# Patient Record
Sex: FEMALE | Race: WHITE | Employment: OTHER | URBAN - NONMETROPOLITAN AREA
[De-identification: names, ages, dates, MRNs, and addresses within clinical notes are randomized per-mention and may not be internally consistent; named-entity substitution may affect disease eponyms.]

---

## 2017-01-05 RX ORDER — LOSARTAN POTASSIUM AND HYDROCHLOROTHIAZIDE 12.5; 5 MG/1; MG/1
TABLET ORAL
Qty: 90 TABLET | Refills: 1 | Status: SHIPPED | OUTPATIENT
Start: 2017-01-05 | End: 2018-07-13 | Stop reason: SDUPTHER

## 2017-01-27 DIAGNOSIS — Z12.31 ENCOUNTER FOR SCREENING MAMMOGRAM FOR BREAST CANCER: Primary | ICD-10-CM

## 2017-02-01 ENCOUNTER — HOSPITAL ENCOUNTER (OUTPATIENT)
Dept: CT IMAGING | Facility: MEDICAL CENTER | Age: 72
Discharge: HOME | End: 2017-02-01
Attending: NURSE PRACTITIONER | Admitting: NURSE PRACTITIONER

## 2017-02-01 DIAGNOSIS — C50.411 MALIGNANT NEOPLASM OF UPPER-OUTER QUADRANT OF RIGHT FEMALE BREAST (HCC): ICD-10-CM

## 2017-02-01 DIAGNOSIS — C79.51 SECONDARY MALIGNANT NEOPLASM OF BONE (HCC): ICD-10-CM

## 2017-02-01 DIAGNOSIS — C50.411 BREAST CANCER OF UPPER-OUTER QUADRANT OF RIGHT FEMALE BREAST (HCC): ICD-10-CM

## 2017-02-08 DIAGNOSIS — R06.09 DYSPNEA ON EXERTION: ICD-10-CM

## 2017-02-09 RX ORDER — RIVAROXABAN 20 MG/1
TABLET, FILM COATED ORAL
Qty: 30 TABLET | Refills: 2 | Status: SHIPPED | OUTPATIENT
Start: 2017-02-09 | End: 2017-05-11 | Stop reason: SDUPTHER

## 2017-02-12 DIAGNOSIS — F41.1 GENERALIZED ANXIETY DISORDER: ICD-10-CM

## 2017-02-16 DIAGNOSIS — M54.41 CHRONIC MIDLINE LOW BACK PAIN WITH BILATERAL SCIATICA: ICD-10-CM

## 2017-02-16 DIAGNOSIS — G89.29 CHRONIC MIDLINE LOW BACK PAIN WITH BILATERAL SCIATICA: ICD-10-CM

## 2017-02-16 DIAGNOSIS — M54.42 CHRONIC MIDLINE LOW BACK PAIN WITH BILATERAL SCIATICA: ICD-10-CM

## 2017-02-16 RX ORDER — GABAPENTIN 300 MG/1
CAPSULE ORAL
Qty: 90 CAPSULE | Refills: 2 | Status: SHIPPED | OUTPATIENT
Start: 2017-02-16 | End: 2017-06-12 | Stop reason: SDUPTHER

## 2017-04-20 ENCOUNTER — TELEPHONE (OUTPATIENT)
Dept: ORTHOPEDIC SURGERY | Age: 72
End: 2017-04-20

## 2017-04-20 ENCOUNTER — OFFICE VISIT (OUTPATIENT)
Dept: ORTHOPEDIC SURGERY | Age: 72
End: 2017-04-20

## 2017-04-20 VITALS
SYSTOLIC BLOOD PRESSURE: 130 MMHG | BODY MASS INDEX: 33.86 KG/M2 | HEART RATE: 88 BPM | DIASTOLIC BLOOD PRESSURE: 72 MMHG | WEIGHT: 184 LBS | HEIGHT: 62 IN

## 2017-04-20 DIAGNOSIS — M25.561 PAIN IN BOTH KNEES, UNSPECIFIED CHRONICITY: Primary | ICD-10-CM

## 2017-04-20 DIAGNOSIS — M25.562 PAIN IN BOTH KNEES, UNSPECIFIED CHRONICITY: Primary | ICD-10-CM

## 2017-04-20 DIAGNOSIS — M17.0 PRIMARY OSTEOARTHRITIS OF BOTH KNEES: ICD-10-CM

## 2017-04-20 PROCEDURE — 99213 OFFICE O/P EST LOW 20 MIN: CPT | Performed by: PHYSICIAN ASSISTANT

## 2017-04-20 PROCEDURE — 73564 X-RAY EXAM KNEE 4 OR MORE: CPT | Performed by: PHYSICIAN ASSISTANT

## 2017-04-20 PROCEDURE — 20610 DRAIN/INJ JOINT/BURSA W/O US: CPT | Performed by: PHYSICIAN ASSISTANT

## 2017-04-28 ENCOUNTER — NURSE ONLY (OUTPATIENT)
Dept: ORTHOPEDIC SURGERY | Age: 72
End: 2017-04-28

## 2017-04-28 VITALS
WEIGHT: 184 LBS | DIASTOLIC BLOOD PRESSURE: 78 MMHG | HEIGHT: 62 IN | HEART RATE: 68 BPM | BODY MASS INDEX: 33.86 KG/M2 | SYSTOLIC BLOOD PRESSURE: 129 MMHG

## 2017-04-28 DIAGNOSIS — M17.0 PRIMARY OSTEOARTHRITIS OF BOTH KNEES: Primary | ICD-10-CM

## 2017-04-28 PROCEDURE — 20610 DRAIN/INJ JOINT/BURSA W/O US: CPT | Performed by: PHYSICIAN ASSISTANT

## 2017-05-01 ENCOUNTER — OFFICE VISIT (OUTPATIENT)
Dept: FAMILY MEDICINE CLINIC | Age: 72
End: 2017-05-01

## 2017-05-01 VITALS
HEIGHT: 63 IN | BODY MASS INDEX: 33.16 KG/M2 | HEART RATE: 93 BPM | WEIGHT: 187.13 LBS | DIASTOLIC BLOOD PRESSURE: 78 MMHG | SYSTOLIC BLOOD PRESSURE: 129 MMHG | OXYGEN SATURATION: 93 %

## 2017-05-01 DIAGNOSIS — M15.9 PRIMARY OSTEOARTHRITIS INVOLVING MULTIPLE JOINTS: ICD-10-CM

## 2017-05-01 DIAGNOSIS — I10 ESSENTIAL HYPERTENSION: Primary | ICD-10-CM

## 2017-05-01 DIAGNOSIS — C79.51 SECONDARY MALIGNANT NEOPLASM OF BONE (HCC): ICD-10-CM

## 2017-05-01 DIAGNOSIS — C50.411 BREAST CANCER OF UPPER-OUTER QUADRANT OF RIGHT FEMALE BREAST (HCC): ICD-10-CM

## 2017-05-01 PROCEDURE — 99214 OFFICE O/P EST MOD 30 MIN: CPT | Performed by: FAMILY MEDICINE

## 2017-05-04 ENCOUNTER — NURSE ONLY (OUTPATIENT)
Dept: ORTHOPEDIC SURGERY | Age: 72
End: 2017-05-04

## 2017-05-04 VITALS — HEIGHT: 62 IN | WEIGHT: 188 LBS | BODY MASS INDEX: 34.6 KG/M2

## 2017-05-04 DIAGNOSIS — M17.0 PRIMARY OSTEOARTHRITIS OF BOTH KNEES: Primary | ICD-10-CM

## 2017-05-04 PROCEDURE — 20610 DRAIN/INJ JOINT/BURSA W/O US: CPT | Performed by: PHYSICIAN ASSISTANT

## 2017-05-10 ENCOUNTER — TELEPHONE (OUTPATIENT)
Dept: ORTHOPEDIC SURGERY | Age: 72
End: 2017-05-10

## 2017-05-11 DIAGNOSIS — R06.09 DYSPNEA ON EXERTION: ICD-10-CM

## 2017-05-11 RX ORDER — RIVAROXABAN 20 MG/1
TABLET, FILM COATED ORAL
Qty: 30 TABLET | Refills: 5 | Status: SHIPPED | OUTPATIENT
Start: 2017-05-11 | End: 2017-11-13 | Stop reason: SDUPTHER

## 2017-05-25 RX ORDER — OMEPRAZOLE 20 MG/1
CAPSULE, DELAYED RELEASE ORAL
Qty: 90 CAPSULE | Refills: 1 | Status: SHIPPED | OUTPATIENT
Start: 2017-05-25 | End: 2017-11-17 | Stop reason: SDUPTHER

## 2017-05-30 ENCOUNTER — HOSPITAL ENCOUNTER (OUTPATIENT)
Dept: CT IMAGING | Facility: MEDICAL CENTER | Age: 72
Discharge: HOME | End: 2017-05-30
Attending: INTERNAL MEDICINE | Admitting: INTERNAL MEDICINE

## 2017-05-30 DIAGNOSIS — C50.411 MALIGNANT NEOPLASM OF UPPER-OUTER QUADRANT OF RIGHT FEMALE BREAST (HCC): ICD-10-CM

## 2017-05-30 DIAGNOSIS — C79.51 SECONDARY MALIGNANT NEOPLASM OF BONE (HCC): ICD-10-CM

## 2017-05-30 DIAGNOSIS — C50.411 BREAST CANCER OF UPPER-OUTER QUADRANT OF RIGHT FEMALE BREAST (HCC): ICD-10-CM

## 2017-06-12 DIAGNOSIS — M54.42 CHRONIC MIDLINE LOW BACK PAIN WITH BILATERAL SCIATICA: ICD-10-CM

## 2017-06-12 DIAGNOSIS — G89.29 CHRONIC MIDLINE LOW BACK PAIN WITH BILATERAL SCIATICA: ICD-10-CM

## 2017-06-12 DIAGNOSIS — M54.41 CHRONIC MIDLINE LOW BACK PAIN WITH BILATERAL SCIATICA: ICD-10-CM

## 2017-06-12 RX ORDER — GABAPENTIN 300 MG/1
CAPSULE ORAL
Qty: 90 CAPSULE | Refills: 2 | Status: SHIPPED | OUTPATIENT
Start: 2017-06-12 | End: 2017-06-14 | Stop reason: ALTCHOICE

## 2017-06-14 ENCOUNTER — OFFICE VISIT (OUTPATIENT)
Dept: FAMILY MEDICINE CLINIC | Age: 72
End: 2017-06-14

## 2017-06-14 VITALS
WEIGHT: 184.6 LBS | DIASTOLIC BLOOD PRESSURE: 90 MMHG | BODY MASS INDEX: 33.97 KG/M2 | OXYGEN SATURATION: 96 % | SYSTOLIC BLOOD PRESSURE: 134 MMHG | HEART RATE: 74 BPM

## 2017-06-14 DIAGNOSIS — M54.32 SCIATICA OF LEFT SIDE: Primary | ICD-10-CM

## 2017-06-14 DIAGNOSIS — C79.51 SECONDARY MALIGNANT NEOPLASM OF BONE (HCC): ICD-10-CM

## 2017-06-14 PROCEDURE — 99213 OFFICE O/P EST LOW 20 MIN: CPT | Performed by: FAMILY MEDICINE

## 2017-06-14 RX ORDER — PREGABALIN 75 MG/1
75 CAPSULE ORAL 2 TIMES DAILY
Qty: 60 CAPSULE | Refills: 3 | Status: SHIPPED | OUTPATIENT
Start: 2017-06-14 | End: 2017-12-10 | Stop reason: SDUPTHER

## 2017-08-17 ENCOUNTER — HOSPITAL ENCOUNTER (OUTPATIENT)
Dept: CT IMAGING | Facility: MEDICAL CENTER | Age: 72
Discharge: HOME | End: 2017-08-17
Attending: INTERNAL MEDICINE | Admitting: INTERNAL MEDICINE

## 2017-08-17 DIAGNOSIS — C50.411 BREAST CANCER OF UPPER-OUTER QUADRANT OF RIGHT FEMALE BREAST (HCC): ICD-10-CM

## 2017-08-17 DIAGNOSIS — C50.411 MALIGNANT NEOPLASM OF UPPER-OUTER QUADRANT OF RIGHT FEMALE BREAST (HCC): ICD-10-CM

## 2017-08-21 DIAGNOSIS — F41.1 GENERALIZED ANXIETY DISORDER: ICD-10-CM

## 2017-08-31 ENCOUNTER — TELEPHONE (OUTPATIENT)
Dept: FAMILY MEDICINE CLINIC | Age: 72
End: 2017-08-31

## 2017-11-06 ENCOUNTER — OFFICE VISIT (OUTPATIENT)
Dept: FAMILY MEDICINE CLINIC | Age: 72
End: 2017-11-06

## 2017-11-06 VITALS
SYSTOLIC BLOOD PRESSURE: 136 MMHG | WEIGHT: 180.5 LBS | HEART RATE: 87 BPM | BODY MASS INDEX: 31.98 KG/M2 | DIASTOLIC BLOOD PRESSURE: 84 MMHG | HEIGHT: 63 IN

## 2017-11-06 DIAGNOSIS — I10 ESSENTIAL HYPERTENSION: ICD-10-CM

## 2017-11-06 DIAGNOSIS — E78.2 MIXED HYPERLIPIDEMIA: ICD-10-CM

## 2017-11-06 DIAGNOSIS — I82.4Z1 ACUTE DEEP VEIN THROMBOSIS (DVT) OF DISTAL END OF RIGHT LOWER EXTREMITY (HCC): ICD-10-CM

## 2017-11-06 DIAGNOSIS — Z23 NEED FOR STREPTOCOCCUS PNEUMONIAE VACCINATION: ICD-10-CM

## 2017-11-06 DIAGNOSIS — C50.411 MALIGNANT NEOPLASM OF UPPER-OUTER QUADRANT OF RIGHT BREAST IN FEMALE, ESTROGEN RECEPTOR NEGATIVE (HCC): Primary | ICD-10-CM

## 2017-11-06 DIAGNOSIS — Z17.1 MALIGNANT NEOPLASM OF UPPER-OUTER QUADRANT OF RIGHT BREAST IN FEMALE, ESTROGEN RECEPTOR NEGATIVE (HCC): Primary | ICD-10-CM

## 2017-11-06 DIAGNOSIS — F41.1 GENERALIZED ANXIETY DISORDER: ICD-10-CM

## 2017-11-06 PROCEDURE — 99214 OFFICE O/P EST MOD 30 MIN: CPT | Performed by: FAMILY MEDICINE

## 2017-11-06 PROCEDURE — G8417 CALC BMI ABV UP PARAM F/U: HCPCS | Performed by: FAMILY MEDICINE

## 2017-11-06 PROCEDURE — 3014F SCREEN MAMMO DOC REV: CPT | Performed by: FAMILY MEDICINE

## 2017-11-06 PROCEDURE — 1090F PRES/ABSN URINE INCON ASSESS: CPT | Performed by: FAMILY MEDICINE

## 2017-11-06 PROCEDURE — G8482 FLU IMMUNIZE ORDER/ADMIN: HCPCS | Performed by: FAMILY MEDICINE

## 2017-11-06 PROCEDURE — 1123F ACP DISCUSS/DSCN MKR DOCD: CPT | Performed by: FAMILY MEDICINE

## 2017-11-06 PROCEDURE — G8399 PT W/DXA RESULTS DOCUMENT: HCPCS | Performed by: FAMILY MEDICINE

## 2017-11-06 PROCEDURE — 90670 PCV13 VACCINE IM: CPT | Performed by: FAMILY MEDICINE

## 2017-11-06 PROCEDURE — G0009 ADMIN PNEUMOCOCCAL VACCINE: HCPCS | Performed by: FAMILY MEDICINE

## 2017-11-06 PROCEDURE — 4040F PNEUMOC VAC/ADMIN/RCVD: CPT | Performed by: FAMILY MEDICINE

## 2017-11-06 PROCEDURE — 1036F TOBACCO NON-USER: CPT | Performed by: FAMILY MEDICINE

## 2017-11-06 PROCEDURE — G8427 DOCREV CUR MEDS BY ELIG CLIN: HCPCS | Performed by: FAMILY MEDICINE

## 2017-11-06 PROCEDURE — 3017F COLORECTAL CA SCREEN DOC REV: CPT | Performed by: FAMILY MEDICINE

## 2017-11-06 NOTE — PROGRESS NOTES
Subjective:   She presents for follow up of her breast cancer high blood pressure anxiety high cholesterol and DVT. She has blood work done on a regular basis by hematology. The only thing they don't check is her cholesterol but she states she is not worried about cholesterol since she has all these other issues . She already had her flu shot her anxiety has been under control with current medications her blood pressures have been controlled she's taken her blood thinners every day and will for life       She is allergic to codeine. She   reports that she quit smoking about 18 years ago. Her smoking use included Cigarettes. She has a 0.10 pack-year smoking history. She has never used smokeless tobacco. Review of systems negative for chest pain swelling shortness of breath wheezing abdominal pain rectal bleeding  Objective:   /84 (Site: Right Arm, Position: Sitting, Cuff Size: Large Adult)   Pulse 87   Ht 5' 2.5\" (1.588 m)   Wt 180 lb 8 oz (81.9 kg)   BMI 32.49 kg/m²   BP Readings from Last 3 Encounters:   11/06/17 136/84   08/24/17 120/66   07/27/17 132/74     Physical Exam   Constitutional: She is oriented to person, place, and time. She appears well-developed and well-nourished. Non-toxic appearance. HENT:   Head: Normocephalic. Eyes: Conjunctivae are normal. Right eye exhibits no discharge. Left eye exhibits no discharge. No scleral icterus. Neck: Neck supple. Carotid bruit is not present. No thyroid mass and no thyromegaly present. Cardiovascular: Normal rate, regular rhythm and normal heart sounds. No murmur heard. Pulmonary/Chest: Breath sounds normal. No respiratory distress. She has no wheezes. She has no rales. Abdominal: Soft. She exhibits no distension and no mass. There is no hepatosplenomegaly. There is no tenderness. There is no rebound and no guarding. Musculoskeletal: She exhibits no edema. Lymphadenopathy:     She has no cervical adenopathy.         Right: No

## 2017-11-13 DIAGNOSIS — R06.09 DYSPNEA ON EXERTION: ICD-10-CM

## 2017-11-13 RX ORDER — RIVAROXABAN 20 MG/1
TABLET, FILM COATED ORAL
Qty: 30 TABLET | Refills: 5 | Status: SHIPPED | OUTPATIENT
Start: 2017-11-13 | End: 2018-05-16 | Stop reason: SDUPTHER

## 2017-11-17 RX ORDER — OMEPRAZOLE 20 MG/1
CAPSULE, DELAYED RELEASE ORAL
Qty: 90 CAPSULE | Refills: 1 | Status: SHIPPED | OUTPATIENT
Start: 2017-11-17 | End: 2018-05-24 | Stop reason: SDUPTHER

## 2017-11-21 ENCOUNTER — HOSPITAL ENCOUNTER (OUTPATIENT)
Dept: CT IMAGING | Facility: MEDICAL CENTER | Age: 72
Discharge: HOME | End: 2017-11-21
Attending: NURSE PRACTITIONER | Admitting: NURSE PRACTITIONER

## 2017-11-21 DIAGNOSIS — C50.411 MALIGNANT NEOPLASM OF UPPER-OUTER QUADRANT OF RIGHT FEMALE BREAST (HCC): ICD-10-CM

## 2017-11-21 DIAGNOSIS — C50.411 MALIGNANT NEOPLASM OF UPPER-OUTER QUADRANT OF RIGHT FEMALE BREAST, UNSPECIFIED ESTROGEN RECEPTOR STATUS (HCC): ICD-10-CM

## 2017-11-21 LAB
A/G RATIO: 1.1 (ref 1.1–2.2)
ALBUMIN SERPL-MCNC: 4.1 G/DL (ref 3.4–5)
ALP BLD-CCNC: 140 U/L (ref 40–129)
ALT SERPL-CCNC: 23 U/L (ref 10–40)
ANION GAP SERPL CALCULATED.3IONS-SCNC: 11 MMOL/L (ref 3–16)
AST SERPL-CCNC: 35 U/L (ref 15–37)
BASOPHILS ABSOLUTE: 0.1 K/UL (ref 0–0.2)
BASOPHILS RELATIVE PERCENT: 0.9 %
BILIRUB SERPL-MCNC: 0.7 MG/DL (ref 0–1)
BUN BLDV-MCNC: 21 MG/DL (ref 7–20)
CALCIUM SERPL-MCNC: 9.6 MG/DL (ref 8.3–10.6)
CHLORIDE BLD-SCNC: 94 MMOL/L (ref 99–110)
CO2: 28 MMOL/L (ref 21–32)
CREAT SERPL-MCNC: 0.9 MG/DL (ref 0.6–1.2)
EOSINOPHILS ABSOLUTE: 0.1 K/UL (ref 0–0.6)
EOSINOPHILS RELATIVE PERCENT: 1.9 %
GFR AFRICAN AMERICAN: >60
GFR NON-AFRICAN AMERICAN: >60
GLOBULIN: 3.8 G/DL
GLUCOSE BLD-MCNC: 116 MG/DL (ref 70–99)
HCT VFR BLD CALC: 39.1 % (ref 36–48)
HEMOGLOBIN: 12.8 G/DL (ref 12–16)
LYMPHOCYTES ABSOLUTE: 1 K/UL (ref 1–5.1)
LYMPHOCYTES RELATIVE PERCENT: 13.5 %
MCH RBC QN AUTO: 28.3 PG (ref 26–34)
MCHC RBC AUTO-ENTMCNC: 32.8 G/DL (ref 31–36)
MCV RBC AUTO: 86.4 FL (ref 80–100)
MONOCYTES ABSOLUTE: 0.5 K/UL (ref 0–1.3)
MONOCYTES RELATIVE PERCENT: 6.3 %
NEUTROPHILS ABSOLUTE: 5.9 K/UL (ref 1.7–7.7)
NEUTROPHILS RELATIVE PERCENT: 77.4 %
PDW BLD-RTO: 19.6 % (ref 12.4–15.4)
PLATELET # BLD: 287 K/UL (ref 135–450)
PMV BLD AUTO: 7 FL (ref 5–10.5)
POTASSIUM SERPL-SCNC: 4.2 MMOL/L (ref 3.5–5.1)
RBC # BLD: 4.52 M/UL (ref 4–5.2)
SODIUM BLD-SCNC: 133 MMOL/L (ref 136–145)
TOTAL PROTEIN: 7.9 G/DL (ref 6.4–8.2)
WBC # BLD: 7.6 K/UL (ref 4–11)

## 2017-12-10 DIAGNOSIS — M54.32 SCIATICA OF LEFT SIDE: ICD-10-CM

## 2017-12-11 NOTE — TELEPHONE ENCOUNTER
Patient requesting refill on Lyrica Last RF 6-14-17 #30 x 3 on 6-14-17 Last OV 11-6-17 and next OV 3-7-18

## 2018-02-20 DIAGNOSIS — F41.1 GENERALIZED ANXIETY DISORDER: ICD-10-CM

## 2018-02-26 ENCOUNTER — TELEPHONE (OUTPATIENT)
Dept: FAMILY MEDICINE CLINIC | Age: 73
End: 2018-02-26

## 2018-02-26 NOTE — TELEPHONE ENCOUNTER
Patient is c/o sciatic pain. She said you had prescribed Lyrica for her which really helped but her family told her that it made her really mean so she quit taking it.   Is there something else you could prescribe for her

## 2018-02-27 RX ORDER — PREDNISONE 20 MG/1
TABLET ORAL
Qty: 12 TABLET | Refills: 0 | Status: SHIPPED | OUTPATIENT
Start: 2018-02-27 | End: 2018-09-07 | Stop reason: ALTCHOICE

## 2018-03-07 ENCOUNTER — HOSPITAL ENCOUNTER (OUTPATIENT)
Dept: CT IMAGING | Facility: MEDICAL CENTER | Age: 73
Discharge: HOME | End: 2018-03-07
Attending: NURSE PRACTITIONER | Admitting: NURSE PRACTITIONER

## 2018-03-07 ENCOUNTER — OFFICE VISIT (OUTPATIENT)
Dept: FAMILY MEDICINE CLINIC | Age: 73
End: 2018-03-07

## 2018-03-07 VITALS
DIASTOLIC BLOOD PRESSURE: 76 MMHG | WEIGHT: 176.5 LBS | HEART RATE: 99 BPM | SYSTOLIC BLOOD PRESSURE: 106 MMHG | TEMPERATURE: 98.4 F | HEIGHT: 63 IN | OXYGEN SATURATION: 97 % | BODY MASS INDEX: 31.27 KG/M2

## 2018-03-07 DIAGNOSIS — C50.411 MALIGNANT NEOPLASM OF UPPER-OUTER QUADRANT OF RIGHT FEMALE BREAST, UNSPECIFIED ESTROGEN RECEPTOR STATUS (HCC): ICD-10-CM

## 2018-03-07 DIAGNOSIS — Z17.1 MALIGNANT NEOPLASM OF UPPER-OUTER QUADRANT OF RIGHT BREAST IN FEMALE, ESTROGEN RECEPTOR NEGATIVE (HCC): Primary | ICD-10-CM

## 2018-03-07 DIAGNOSIS — C50.411 MALIGNANT NEOPLASM OF UPPER-OUTER QUADRANT OF RIGHT BREAST IN FEMALE, ESTROGEN RECEPTOR NEGATIVE (HCC): Primary | ICD-10-CM

## 2018-03-07 DIAGNOSIS — K21.9 GASTROESOPHAGEAL REFLUX DISEASE WITHOUT ESOPHAGITIS: ICD-10-CM

## 2018-03-07 DIAGNOSIS — C79.51 SECONDARY MALIGNANT NEOPLASM OF BONE (HCC): ICD-10-CM

## 2018-03-07 DIAGNOSIS — I10 ESSENTIAL HYPERTENSION: ICD-10-CM

## 2018-03-07 DIAGNOSIS — C50.411 MALIGNANT NEOPLASM OF UPPER-OUTER QUADRANT OF RIGHT FEMALE BREAST (HCC): ICD-10-CM

## 2018-03-07 DIAGNOSIS — M51.36 DDD (DEGENERATIVE DISC DISEASE), LUMBAR: ICD-10-CM

## 2018-03-07 PROCEDURE — 1036F TOBACCO NON-USER: CPT | Performed by: FAMILY MEDICINE

## 2018-03-07 PROCEDURE — G8482 FLU IMMUNIZE ORDER/ADMIN: HCPCS | Performed by: FAMILY MEDICINE

## 2018-03-07 PROCEDURE — 99214 OFFICE O/P EST MOD 30 MIN: CPT | Performed by: FAMILY MEDICINE

## 2018-03-07 PROCEDURE — 4040F PNEUMOC VAC/ADMIN/RCVD: CPT | Performed by: FAMILY MEDICINE

## 2018-03-07 PROCEDURE — 1090F PRES/ABSN URINE INCON ASSESS: CPT | Performed by: FAMILY MEDICINE

## 2018-03-07 PROCEDURE — G8427 DOCREV CUR MEDS BY ELIG CLIN: HCPCS | Performed by: FAMILY MEDICINE

## 2018-03-07 PROCEDURE — G8399 PT W/DXA RESULTS DOCUMENT: HCPCS | Performed by: FAMILY MEDICINE

## 2018-03-07 PROCEDURE — 3014F SCREEN MAMMO DOC REV: CPT | Performed by: FAMILY MEDICINE

## 2018-03-07 PROCEDURE — G8417 CALC BMI ABV UP PARAM F/U: HCPCS | Performed by: FAMILY MEDICINE

## 2018-03-07 PROCEDURE — 1123F ACP DISCUSS/DSCN MKR DOCD: CPT | Performed by: FAMILY MEDICINE

## 2018-03-07 PROCEDURE — 3017F COLORECTAL CA SCREEN DOC REV: CPT | Performed by: FAMILY MEDICINE

## 2018-03-07 ASSESSMENT — PATIENT HEALTH QUESTIONNAIRE - PHQ9
SUM OF ALL RESPONSES TO PHQ QUESTIONS 1-9: 0
SUM OF ALL RESPONSES TO PHQ9 QUESTIONS 1 & 2: 0
1. LITTLE INTEREST OR PLEASURE IN DOING THINGS: 0
2. FEELING DOWN, DEPRESSED OR HOPELESS: 0

## 2018-03-07 NOTE — PROGRESS NOTES
Subjective:   She presents for follow up of her breast cancer, hypertension GERD and back issues we gave her prednisone last week for sciatica and she states it did get better quickly. She has metastatic bone cancer from her breast cancer but things have been stable and she has done remarkably well over the last couple years. She is scheduled for CAT scan or PET scan today she forgets which 1 she is getting blood work done by oncology on a regular basis they're doing everything except for her cholesterol but she states she does not want to get her cholesterol. She states that she has cancer she is not worried about her cholesterol. Her heartburn has been okay and her blood pressures have been good she states        She is allergic to codeine. She   reports that she quit smoking about 19 years ago. Her smoking use included Cigarettes. She has a 0.10 pack-year smoking history. She has never used smokeless tobacco. Review of systems negative for chest pain swelling shortness of breath wheezing abdominal pain rectal bleeding  Objective:   /76 (Site: Left Arm, Position: Sitting, Cuff Size: Large Adult)   Pulse 99   Temp 98.4 °F (36.9 °C) (Oral)   Ht 5' 2.5\" (1.588 m) Comment: With shoes  Wt 176 lb 8 oz (80.1 kg)   SpO2 97%   BMI 31.77 kg/m²   BP Readings from Last 3 Encounters:   03/07/18 106/76   11/06/17 136/84   08/24/17 120/66     Physical Exam   Constitutional: She is oriented to person, place, and time. She appears well-developed and well-nourished. Non-toxic appearance. HENT:   Head: Normocephalic. Eyes: Conjunctivae are normal. Right eye exhibits no discharge. Left eye exhibits no discharge. No scleral icterus. Neck: Neck supple. Carotid bruit is not present. No thyroid mass and no thyromegaly present. Cardiovascular: Normal rate, regular rhythm and normal heart sounds. No murmur heard. Pulmonary/Chest: Breath sounds normal. No respiratory distress. She has no wheezes.  She has no

## 2018-05-16 DIAGNOSIS — R06.09 DYSPNEA ON EXERTION: ICD-10-CM

## 2018-05-16 RX ORDER — RIVAROXABAN 20 MG/1
TABLET, FILM COATED ORAL
Qty: 30 TABLET | Refills: 5 | Status: SHIPPED | OUTPATIENT
Start: 2018-05-16 | End: 2018-11-30 | Stop reason: SDUPTHER

## 2018-05-24 RX ORDER — OMEPRAZOLE 20 MG/1
CAPSULE, DELAYED RELEASE ORAL
Qty: 90 CAPSULE | Refills: 1 | Status: SHIPPED | OUTPATIENT
Start: 2018-05-24 | End: 2018-11-24 | Stop reason: SDUPTHER

## 2018-07-13 RX ORDER — LOSARTAN POTASSIUM AND HYDROCHLOROTHIAZIDE 12.5; 5 MG/1; MG/1
TABLET ORAL
Qty: 90 TABLET | Refills: 3 | Status: SHIPPED | OUTPATIENT
Start: 2018-07-13 | End: 2019-07-17 | Stop reason: SDUPTHER

## 2018-08-24 ENCOUNTER — TELEPHONE (OUTPATIENT)
Dept: FAMILY MEDICINE CLINIC | Age: 73
End: 2018-08-24

## 2018-08-25 DIAGNOSIS — F41.1 GENERALIZED ANXIETY DISORDER: ICD-10-CM

## 2018-09-07 ENCOUNTER — OFFICE VISIT (OUTPATIENT)
Dept: FAMILY MEDICINE CLINIC | Age: 73
End: 2018-09-07

## 2018-09-07 VITALS
SYSTOLIC BLOOD PRESSURE: 116 MMHG | HEART RATE: 78 BPM | DIASTOLIC BLOOD PRESSURE: 76 MMHG | OXYGEN SATURATION: 95 % | WEIGHT: 170 LBS | BODY MASS INDEX: 31.28 KG/M2 | HEIGHT: 62 IN | TEMPERATURE: 98.4 F

## 2018-09-07 DIAGNOSIS — H25.9 AGE-RELATED CATARACT OF BOTH EYES, UNSPECIFIED AGE-RELATED CATARACT TYPE: Primary | ICD-10-CM

## 2018-09-07 DIAGNOSIS — I10 ESSENTIAL HYPERTENSION: ICD-10-CM

## 2018-09-07 DIAGNOSIS — Z17.1 MALIGNANT NEOPLASM OF UPPER-OUTER QUADRANT OF RIGHT BREAST IN FEMALE, ESTROGEN RECEPTOR NEGATIVE (HCC): ICD-10-CM

## 2018-09-07 DIAGNOSIS — C50.411 MALIGNANT NEOPLASM OF UPPER-OUTER QUADRANT OF RIGHT BREAST IN FEMALE, ESTROGEN RECEPTOR NEGATIVE (HCC): ICD-10-CM

## 2018-09-07 DIAGNOSIS — I82.4Z1 ACUTE DEEP VEIN THROMBOSIS (DVT) OF DISTAL END OF RIGHT LOWER EXTREMITY (HCC): ICD-10-CM

## 2018-09-07 PROCEDURE — 1036F TOBACCO NON-USER: CPT | Performed by: FAMILY MEDICINE

## 2018-09-07 PROCEDURE — G8399 PT W/DXA RESULTS DOCUMENT: HCPCS | Performed by: FAMILY MEDICINE

## 2018-09-07 PROCEDURE — G8417 CALC BMI ABV UP PARAM F/U: HCPCS | Performed by: FAMILY MEDICINE

## 2018-09-07 PROCEDURE — 1123F ACP DISCUSS/DSCN MKR DOCD: CPT | Performed by: FAMILY MEDICINE

## 2018-09-07 PROCEDURE — 1090F PRES/ABSN URINE INCON ASSESS: CPT | Performed by: FAMILY MEDICINE

## 2018-09-07 PROCEDURE — 3017F COLORECTAL CA SCREEN DOC REV: CPT | Performed by: FAMILY MEDICINE

## 2018-09-07 PROCEDURE — 1101F PT FALLS ASSESS-DOCD LE1/YR: CPT | Performed by: FAMILY MEDICINE

## 2018-09-07 PROCEDURE — 4040F PNEUMOC VAC/ADMIN/RCVD: CPT | Performed by: FAMILY MEDICINE

## 2018-09-07 PROCEDURE — 99214 OFFICE O/P EST MOD 30 MIN: CPT | Performed by: FAMILY MEDICINE

## 2018-09-07 PROCEDURE — G8427 DOCREV CUR MEDS BY ELIG CLIN: HCPCS | Performed by: FAMILY MEDICINE

## 2018-09-07 ASSESSMENT — ENCOUNTER SYMPTOMS
WHEEZING: 0
SHORTNESS OF BREATH: 0
BLOOD IN STOOL: 0
ABDOMINAL PAIN: 0
EYE PAIN: 0

## 2018-09-07 NOTE — PROGRESS NOTES
2018    Betty Crockett (:  1945) is a 71 y.o. female, here for a preoperative evaluation of her chronic medical problems before undergoing bilateral cataract surgery by Dr. Tristian Duckworth. She has history of metastatic breast cancer, DVT and essential hypertension. Her chronic issues have been stable recently and blood pressure controlled. She recently had blood work done by her oncologist and was told everything came back ok except for her potassium was a little low but they treated this. She had a virus over the weekend but is feeling better now. Dr Heinz Romberg told her she can hold her blood thinner the day of surgery and restart it the next morning. She is scheduled for the left cataract procedure on 2018 and right one 2018. Patient Active Problem List   Diagnosis    GERD (gastroesophageal reflux disease)    Malignant neoplasm of upper-outer quadrant of right breast in female, estrogen receptor negative ([address]    DDD (degenerative disc disease), lumbar    Anemia    Estrogen receptor negative    Acute deep vein thrombosis (DVT) of distal end of right lower extremity ([address]    Essential hypertension    Mixed hyperlipidemia    Generalized anxiety disorder    Primary osteoarthritis involving multiple joints    Secondary malignant neoplasm of bone ([address]       Review of Systems   Constitutional: Negative for fatigue and unexpected weight change. HENT: Negative for congestion and hearing loss. Eyes: Positive for visual disturbance. Negative for pain. Respiratory: Negative for shortness of breath and wheezing. Cardiovascular: Negative for chest pain and leg swelling. Gastrointestinal: Negative for abdominal pain and blood in stool. Genitourinary: Negative for difficulty urinating and hematuria. Skin: Negative for rash and wound. Neurological: Negative for dizziness and syncope. Psychiatric/Behavioral: Negative for suicidal ideas.  The patient is not nervous/anxious. Prior to Visit Medications    Medication Sig Taking? Authorizing Provider   sertraline (ZOLOFT) 50 MG tablet TAKE 1 TABLET BY MOUTH DAILY Yes Navin Spaulding MD   losartan-hydrochlorothiazide (HYZAAR) 50-12.5 MG per tablet TAKE 1 TABLET BY MOUTH EVERY DAY Yes Navin Spaulding MD   omeprazole (PRILOSEC) 20 MG delayed release capsule TAKE ONE CAPSULE BY MOUTH EVERY DAY Yes Navin Spaulding MD   XARELTO 20 MG TABS tablet TAKE 1 TABLET BY MOUTH DAILY WITH BREAKFAST Yes Navin Spaulding MD   oxyCODONE-acetaminophen (PERCOCET) 5-325 MG per tablet Take 1-2 tablets by mouth every 4 hours as needed for pain. Yes MAVIS Leonard CNP   capecitabine (XELODA) 500 MG chemo tablet TAKE 3 TABLETS (1500 MG) ORALLY TWICE A DAY FOR 7 DAYS, THEN OFF FOR 7DAYS, THEN REPEAT. TAKE WITHIN 30 MIN AFTER A MEAL WITH WATER. REPORT Yes Dee Dee Santo MD   potassium chloride (KLOR-CON M) 20 MEQ extended release tablet Take 1 tablet by mouth 2 times daily Yes Luis Carlos Collazo MD   cyclobenzaprine (FLEXERIL) 10 MG tablet Take 1 tablet by mouth 3 times daily as needed for Muscle spasms Yes Luis Carlos Collazo MD   Pramoxine HCl 1 % CREA Apply as needed Yes MAVIS Leonard CNP   diclofenac sodium (VOLTAREN) 1 % GEL Apply 2 g topically 2 times daily Yes Navin Spaulding MD   LORazepam (ATIVAN) 0.5 MG tablet Take 1 tablet by mouth every 8 hours as needed for Anxiety Yes Navin Spaulding MD   albuterol sulfate HFA (PROVENTIL HFA) 108 (90 BASE) MCG/ACT inhaler Inhale 1-2 puffs into the lungs every 4 hours as needed for Wheezing or Shortness of Breath Yes Navin Spaulding MD   clotrimazole-betamethasone (LOTRISONE) 1-0.05 % cream Apply topically 2 times daily.  Yes Navin Spaulding MD   ipratropium (ATROVENT) 0.06 % nasal spray 2 sprays by Nasal route every morning Yes Dee Dee Santo MD   meclizine (ANTIVERT) 25 MG tablet Take 1 tablet by mouth 3 times daily as needed Yes Navin Spaulding MD 09/07/18 1040   BP: 116/76   Pulse: 78   Temp: 98.4 °F (36.9 °C)   TempSrc: Oral   SpO2: 95%   Weight: 170 lb (77.1 kg)   Height: 5' 1.5\" (1.562 m)  Comment: with shoes     Estimated body mass index is 31.6 kg/m² as calculated from the following:    Height as of this encounter: 5' 1.5\" (1.562 m). Weight as of this encounter: 170 lb (77.1 kg). Physical Exam   Constitutional: She is oriented to person, place, and time. She appears well-developed and well-nourished. She is cooperative. Non-toxic appearance. No distress. Eyes: Pupils are equal, round, and reactive to light. Conjunctivae are normal.   Neck: Trachea normal. Neck supple. No JVD present. Carotid bruit is not present. No thyromegaly present. Cardiovascular: Normal rate, regular rhythm and normal heart sounds. Pulmonary/Chest: Effort normal and breath sounds normal. She has no wheezes. She has no rales. Abdominal: Soft. She exhibits no mass. There is no splenomegaly or hepatomegaly. There is no tenderness. There is no guarding. Musculoskeletal: She exhibits no edema. Lymphadenopathy:     She has no cervical adenopathy. Neurological: She is alert and oriented to person, place, and time. Skin: Skin is warm and dry. Psychiatric: She has a normal mood and affect. Her behavior is normal.   Vitals reviewed.     Immunization History   Administered Date(s) Administered    Influenza Vaccine, unspecified formulation 09/30/2016    Influenza Virus Vaccine 10/01/2011, 10/01/2012, 09/24/2014, 10/15/2017    Pneumococcal 13-valent Conjugate ([id number]) 11/06/2017    Pneumococcal Polysaccharide ([id number]) 10/01/2011     Health Maintenance   Topic Date Due    Shingles Vaccine (1 of 2 - 2 Dose Series) 10/25/1996    Colon cancer screen colonoscopy  01/01/2014    Breast cancer screen  08/21/2014    Lipid screen  05/10/2018    Flu vaccine (1) 09/01/2018    Hepatitis C screen  11/06/2018 (Originally 1945)    DTaP/Tdap/Td vaccine (1 - Tdap) 03/07/2019 (Originally 10/25/1964)    Pneumococcal low/med risk (2 of 2 - PPSV23) 11/06/2018    Potassium monitoring  11/21/2018    Creatinine monitoring  11/21/2018    DEXA (modify frequency per FRAX score)  Addressed     ASSESSMENT/PLAN:  1. Age-related cataract of both eyes, unspecified age-related cataract type    2. Malignant neoplasm of upper-outer quadrant of right breast in female, estrogen receptor negative ([address]    3. Acute deep vein thrombosis (DVT) of distal end of right lower extremity ([address]    4. Essential hypertension      Per patient report recent labs ok except potassium was low but this is being treated by Dr Julien Palacios. We will obtain a copy. Patient was given directions by Dr Julien Palacios on holding her blood thinner one day. My opinion is that the patient is currently medically stable for this low risk procedure. She was advised to avoid aspirin, NSAIDs, fish oil supplements, vitamin E supplements  one week before planned procedure. This note will be sent to the surgeon. Kamron Parmar M.D. [address] [facility]. [address], [address] [place] [address]    An electronic signature was used to authenticate this note.     --Kamron Parmar MD on 9/7/2018 at 11:21 AM

## 2018-09-11 ENCOUNTER — TELEPHONE (OUTPATIENT)
Dept: FAMILY MEDICINE CLINIC | Age: 73
End: 2018-09-11

## 2018-09-12 ENCOUNTER — ANESTHESIA EVENT (OUTPATIENT)
Dept: OPERATING ROOM | Age: 73
End: 2018-09-12
Payer: MEDICARE

## 2018-09-12 NOTE — ANESTHESIA PRE PROCEDURE
REPEAT. TAKE WITHIN 30 MIN AFTER A MEAL WITH WATER. REPORT 84 tablet 2    potassium chloride (KLOR-CON M) 20 MEQ extended release tablet Take 1 tablet by mouth 2 times daily 60 tablet 3    cyclobenzaprine (FLEXERIL) 10 MG tablet Take 1 tablet by mouth 3 times daily as needed for Muscle spasms 90 tablet 3    Pramoxine HCl 1 % CREA Apply as needed 1 g 0    diclofenac sodium (VOLTAREN) 1 % GEL Apply 2 g topically 2 times daily 1 Tube 3    albuterol sulfate HFA (PROVENTIL HFA) 108 (90 BASE) MCG/ACT inhaler Inhale 1-2 puffs into the lungs every 4 hours as needed for Wheezing or Shortness of Breath 1 Inhaler 5    clotrimazole-betamethasone (LOTRISONE) 1-0.05 % cream Apply topically 2 times daily. 45 g 0    ipratropium (ATROVENT) 0.06 % nasal spray 2 sprays by Nasal route every morning 1 Bottle 5    meclizine (ANTIVERT) 25 MG tablet Take 1 tablet by mouth 3 times daily as needed 30 tablet 3    LORazepam (ATIVAN) 0.5 MG tablet Take 1 tablet by mouth every 8 hours as needed for Anxiety 60 tablet 1    Spacer/Aero-Holding Chambers (POCKET SPACER) RYAN by Does not apply route. Use with inhaler 1 Device 5       Allergies:     Allergies   Allergen Reactions    Codeine Other (See Comments)     Hallucinations       Problem List:    Patient Active Problem List   Diagnosis Code    GERD (gastroesophageal reflux disease) K21.9    Malignant neoplasm of upper-outer quadrant of right breast in female, estrogen receptor negative ([address] C50.411, Z17.1    DDD (degenerative disc disease), lumbar M51.36    Anemia D64.9    Estrogen receptor negative Z17.1    Acute deep vein thrombosis (DVT) of distal end of right lower extremity (HCC) I82.4Z1    Essential hypertension I10    Mixed hyperlipidemia E78.2    Generalized anxiety disorder F41.1    Primary osteoarthritis involving multiple joints M15.0    Secondary malignant neoplasm of bone ([address] C79.51       Past Medical History:        Diagnosis Date    Abnormal CT scan, chest CO2 28 11/21/2017    BUN 21 11/21/2017    CREATININE 0.9 11/21/2017    GFRAA >60 11/21/2017    GFRAA >60 05/10/2013    AGRATIO 1.1 11/21/2017    LABGLOM >60 11/21/2017    GLUCOSE 116 11/21/2017    GLUCOSE 97 08/24/2017    PROT 7.9 11/21/2017    PROT 7.5 08/24/2017    CALCIUM 9.6 11/21/2017    BILITOT 0.7 11/21/2017    ALKPHOS 140 11/21/2017    AST 35 11/21/2017    ALT 23 11/21/2017       POC Tests: No results for input(s): POCGLU, POCNA, POCK, POCCL, POCBUN, POCHEMO, POCHCT in the last 72 hours. Coags: No results found for: PROTIME, INR, APTT    HCG (If Applicable): No results found for: PREGTESTUR, PREGSERUM, HCG, HCGQUANT     ABGs: No results found for: PHART, PO2ART, MOV2MDB, EOP5ZUR, BEART, E3QAKTUS     Type & Screen (If Applicable):  No results found for: LABABO, [address]    Anesthesia Evaluation  Patient summary reviewed and Nursing notes reviewed no history of anesthetic complications:   Airway: Mallampati: II     Neck ROM: full   Dental:    (+) upper dentures and lower dentures      Pulmonary:Negative Pulmonary ROS and normal exam    (+) pneumonia:                             Cardiovascular:Negative CV ROS    (+) hypertension:, hyperlipidemia                  Neuro/Psych:   Negative Neuro/Psych ROS  (+) psychiatric history:            GI/Hepatic/Renal: Neg GI/Hepatic/Renal ROS  (+) GERD: well controlled, hepatitis:, liver disease:,      (-) hiatal hernia       Endo/Other: Negative Endo/Other ROS                    Abdominal:           Vascular:                                      Anesthesia Plan      general     ASA 2     (I discussed with the patient the risks and benefits of PIV, general anesthesia, IV Narcotics, PACU. All questions were answered the patient agrees with the plan.)  Induction: intravenous. Pre-Operative Diagnosis: CATARACT LEFT EYE    71 y.o.   BMI:  Body mass index is 31.18 kg/m².      Vitals:    09/10/18 1600 09/13/18 0909   BP:  (!) 141/82   Pulse:  80   Resp:  18

## 2018-09-13 ENCOUNTER — ANESTHESIA (OUTPATIENT)
Dept: OPERATING ROOM | Age: 73
End: 2018-09-13
Payer: MEDICARE

## 2018-09-13 ENCOUNTER — HOSPITAL ENCOUNTER (OUTPATIENT)
Age: 73
Setting detail: OUTPATIENT SURGERY
Discharge: HOME | End: 2018-09-13
Attending: OPHTHALMOLOGY | Admitting: OPHTHALMOLOGY
Payer: MEDICARE

## 2018-09-13 VITALS
SYSTOLIC BLOOD PRESSURE: 132 MMHG | HEIGHT: 61 IN | TEMPERATURE: 97.2 F | RESPIRATION RATE: 13 BRPM | WEIGHT: 165 LBS | DIASTOLIC BLOOD PRESSURE: 81 MMHG | OXYGEN SATURATION: 96 % | HEART RATE: 71 BPM | BODY MASS INDEX: 31.15 KG/M2

## 2018-09-13 VITALS
OXYGEN SATURATION: 100 % | SYSTOLIC BLOOD PRESSURE: 103 MMHG | DIASTOLIC BLOOD PRESSURE: 67 MMHG | RESPIRATION RATE: 11 BRPM

## 2018-09-13 PROCEDURE — 7100000001 HC PACU RECOVERY - ADDTL 15 MIN: Performed by: OPHTHALMOLOGY

## 2018-09-13 PROCEDURE — 7100000000 HC PACU RECOVERY - FIRST 15 MIN: Performed by: OPHTHALMOLOGY

## 2018-09-13 PROCEDURE — 6360000002 HC RX W HCPCS: Performed by: NURSE ANESTHETIST, CERTIFIED REGISTERED

## 2018-09-13 PROCEDURE — 2580000003 HC RX 258: Performed by: ANESTHESIOLOGY

## 2018-09-13 PROCEDURE — 6370000000 HC RX 637 (ALT 250 FOR IP): Performed by: OPHTHALMOLOGY

## 2018-09-13 PROCEDURE — 2500000003 HC RX 250 WO HCPCS: Performed by: NURSE ANESTHETIST, CERTIFIED REGISTERED

## 2018-09-13 PROCEDURE — V2632 POST CHMBR INTRAOCULAR LENS: HCPCS | Performed by: OPHTHALMOLOGY

## 2018-09-13 PROCEDURE — 2500000003 HC RX 250 WO HCPCS: Performed by: OPHTHALMOLOGY

## 2018-09-13 PROCEDURE — 3700000001 HC ADD 15 MINUTES (ANESTHESIA): Performed by: OPHTHALMOLOGY

## 2018-09-13 PROCEDURE — 3600000013 HC SURGERY LEVEL 3 ADDTL 15MIN: Performed by: OPHTHALMOLOGY

## 2018-09-13 PROCEDURE — 6360000002 HC RX W HCPCS: Performed by: OPHTHALMOLOGY

## 2018-09-13 PROCEDURE — 2580000003 HC RX 258: Performed by: NURSE ANESTHETIST, CERTIFIED REGISTERED

## 2018-09-13 PROCEDURE — 7100000011 HC PHASE II RECOVERY - ADDTL 15 MIN: Performed by: OPHTHALMOLOGY

## 2018-09-13 PROCEDURE — 2709999900 HC NON-CHARGEABLE SUPPLY: Performed by: OPHTHALMOLOGY

## 2018-09-13 PROCEDURE — 7100000010 HC PHASE II RECOVERY - FIRST 15 MIN: Performed by: OPHTHALMOLOGY

## 2018-09-13 PROCEDURE — 3700000000 HC ANESTHESIA ATTENDED CARE: Performed by: OPHTHALMOLOGY

## 2018-09-13 PROCEDURE — 3600000003 HC SURGERY LEVEL 3 BASE: Performed by: OPHTHALMOLOGY

## 2018-09-13 DEVICE — ACRYSOF(R) IQ ASPHERIC NATURAL IOL, SINGLE-PIECE ACRYLIC FOLDABLE PCL, UV WITH BLUE LIGHTFILTER, 13.0MM LENGTH, 6.0MM ANTERIORASYMMETRIC BICONVEX OPTIC, PLANAR HAPTICS.
Type: IMPLANTABLE DEVICE | Site: EYE | Status: FUNCTIONAL
Brand: ACRYSOF®

## 2018-09-13 RX ORDER — PREDNISOLONE ACETATE 10 MG/ML
SUSPENSION/ DROPS OPHTHALMIC PRN
Status: DISCONTINUED | OUTPATIENT
Start: 2018-09-13 | End: 2018-09-13 | Stop reason: HOSPADM

## 2018-09-13 RX ORDER — OXYCODONE HYDROCHLORIDE AND ACETAMINOPHEN 5; 325 MG/1; MG/1
2 TABLET ORAL PRN
Status: DISCONTINUED | OUTPATIENT
Start: 2018-09-13 | End: 2018-09-13 | Stop reason: HOSPADM

## 2018-09-13 RX ORDER — DIPHENHYDRAMINE HYDROCHLORIDE 50 MG/ML
12.5 INJECTION INTRAMUSCULAR; INTRAVENOUS
Status: DISCONTINUED | OUTPATIENT
Start: 2018-09-13 | End: 2018-09-13 | Stop reason: HOSPADM

## 2018-09-13 RX ORDER — PILOCARPINE HYDROCHLORIDE 20 MG/ML
SOLUTION/ DROPS OPHTHALMIC PRN
Status: DISCONTINUED | OUTPATIENT
Start: 2018-09-13 | End: 2018-09-13 | Stop reason: HOSPADM

## 2018-09-13 RX ORDER — TETRACAINE HYDROCHLORIDE 5 MG/ML
SOLUTION OPHTHALMIC PRN
Status: DISCONTINUED | OUTPATIENT
Start: 2018-09-13 | End: 2018-09-13 | Stop reason: HOSPADM

## 2018-09-13 RX ORDER — OXYCODONE HYDROCHLORIDE AND ACETAMINOPHEN 5; 325 MG/1; MG/1
1 TABLET ORAL PRN
Status: DISCONTINUED | OUTPATIENT
Start: 2018-09-13 | End: 2018-09-13 | Stop reason: HOSPADM

## 2018-09-13 RX ORDER — LIDOCAINE HYDROCHLORIDE 10 MG/ML
0.3 INJECTION, SOLUTION EPIDURAL; INFILTRATION; INTRACAUDAL; PERINEURAL
Status: DISCONTINUED | OUTPATIENT
Start: 2018-09-13 | End: 2018-09-13 | Stop reason: HOSPADM

## 2018-09-13 RX ORDER — SODIUM CHLORIDE, SODIUM LACTATE, POTASSIUM CHLORIDE, CALCIUM CHLORIDE 600; 310; 30; 20 MG/100ML; MG/100ML; MG/100ML; MG/100ML
INJECTION, SOLUTION INTRAVENOUS CONTINUOUS PRN
Status: DISCONTINUED | OUTPATIENT
Start: 2018-09-13 | End: 2018-09-13 | Stop reason: SDUPTHER

## 2018-09-13 RX ORDER — HYDROMORPHONE HCL 110MG/55ML
0.25 PATIENT CONTROLLED ANALGESIA SYRINGE INTRAVENOUS EVERY 5 MIN PRN
Status: DISCONTINUED | OUTPATIENT
Start: 2018-09-13 | End: 2018-09-13 | Stop reason: HOSPADM

## 2018-09-13 RX ORDER — SODIUM CHLORIDE 0.9 % (FLUSH) 0.9 %
10 SYRINGE (ML) INJECTION PRN
Status: DISCONTINUED | OUTPATIENT
Start: 2018-09-13 | End: 2018-09-13 | Stop reason: HOSPADM

## 2018-09-13 RX ORDER — LABETALOL HYDROCHLORIDE 5 MG/ML
5 INJECTION, SOLUTION INTRAVENOUS EVERY 10 MIN PRN
Status: DISCONTINUED | OUTPATIENT
Start: 2018-09-13 | End: 2018-09-13 | Stop reason: HOSPADM

## 2018-09-13 RX ORDER — MORPHINE SULFATE 10 MG/ML
2 INJECTION, SOLUTION INTRAMUSCULAR; INTRAVENOUS EVERY 5 MIN PRN
Status: DISCONTINUED | OUTPATIENT
Start: 2018-09-13 | End: 2018-09-13 | Stop reason: HOSPADM

## 2018-09-13 RX ORDER — MORPHINE SULFATE 10 MG/ML
1 INJECTION, SOLUTION INTRAMUSCULAR; INTRAVENOUS EVERY 5 MIN PRN
Status: DISCONTINUED | OUTPATIENT
Start: 2018-09-13 | End: 2018-09-13 | Stop reason: HOSPADM

## 2018-09-13 RX ORDER — PROPOFOL 10 MG/ML
INJECTION, EMULSION INTRAVENOUS PRN
Status: DISCONTINUED | OUTPATIENT
Start: 2018-09-13 | End: 2018-09-13 | Stop reason: SDUPTHER

## 2018-09-13 RX ORDER — FENTANYL CITRATE 50 UG/ML
INJECTION, SOLUTION INTRAMUSCULAR; INTRAVENOUS PRN
Status: DISCONTINUED | OUTPATIENT
Start: 2018-09-13 | End: 2018-09-13 | Stop reason: SDUPTHER

## 2018-09-13 RX ORDER — SODIUM CHLORIDE, SODIUM LACTATE, POTASSIUM CHLORIDE, CALCIUM CHLORIDE 600; 310; 30; 20 MG/100ML; MG/100ML; MG/100ML; MG/100ML
INJECTION, SOLUTION INTRAVENOUS CONTINUOUS
Status: DISCONTINUED | OUTPATIENT
Start: 2018-09-13 | End: 2018-09-13 | Stop reason: HOSPADM

## 2018-09-13 RX ORDER — LIDOCAINE HYDROCHLORIDE 20 MG/ML
INJECTION, SOLUTION INFILTRATION; PERINEURAL PRN
Status: DISCONTINUED | OUTPATIENT
Start: 2018-09-13 | End: 2018-09-13 | Stop reason: SDUPTHER

## 2018-09-13 RX ORDER — SODIUM CHLORIDE, POTASSIUM CHLORIDE, CALCIUM CHLORIDE, MAGNESIUM CHLORIDE, SODIUM ACETATE, AND SODIUM CITRATE 6.4; .75; .48; .3; 3.9; 1.7 MG/ML; MG/ML; MG/ML; MG/ML; MG/ML; MG/ML
SOLUTION IRRIGATION PRN
Status: DISCONTINUED | OUTPATIENT
Start: 2018-09-13 | End: 2018-09-13 | Stop reason: HOSPADM

## 2018-09-13 RX ORDER — TOBRAMYCIN AND DEXAMETHASONE 3; 1 MG/ML; MG/ML
SUSPENSION/ DROPS OPHTHALMIC PRN
Status: DISCONTINUED | OUTPATIENT
Start: 2018-09-13 | End: 2018-09-13 | Stop reason: HOSPADM

## 2018-09-13 RX ORDER — ONDANSETRON 2 MG/ML
4 INJECTION INTRAMUSCULAR; INTRAVENOUS PRN
Status: DISCONTINUED | OUTPATIENT
Start: 2018-09-13 | End: 2018-09-13 | Stop reason: HOSPADM

## 2018-09-13 RX ORDER — SODIUM CHLORIDE 0.9 % (FLUSH) 0.9 %
10 SYRINGE (ML) INJECTION EVERY 12 HOURS SCHEDULED
Status: DISCONTINUED | OUTPATIENT
Start: 2018-09-13 | End: 2018-09-13 | Stop reason: HOSPADM

## 2018-09-13 RX ORDER — HYDRALAZINE HYDROCHLORIDE 20 MG/ML
5 INJECTION INTRAMUSCULAR; INTRAVENOUS EVERY 10 MIN PRN
Status: DISCONTINUED | OUTPATIENT
Start: 2018-09-13 | End: 2018-09-13 | Stop reason: HOSPADM

## 2018-09-13 RX ORDER — PROMETHAZINE HYDROCHLORIDE 25 MG/ML
6.25 INJECTION, SOLUTION INTRAMUSCULAR; INTRAVENOUS
Status: DISCONTINUED | OUTPATIENT
Start: 2018-09-13 | End: 2018-09-13 | Stop reason: HOSPADM

## 2018-09-13 RX ORDER — TOBRAMYCIN AND DEXAMETHASONE 3; 1 MG/ML; MG/ML
1 SUSPENSION/ DROPS OPHTHALMIC SEE ADMIN INSTRUCTIONS
Status: DISCONTINUED | OUTPATIENT
Start: 2018-09-13 | End: 2018-09-13 | Stop reason: HOSPADM

## 2018-09-13 RX ORDER — PREDNISOLONE ACETATE 10 MG/ML
1 SUSPENSION/ DROPS OPHTHALMIC SEE ADMIN INSTRUCTIONS
Status: DISCONTINUED | OUTPATIENT
Start: 2018-09-13 | End: 2018-09-13 | Stop reason: HOSPADM

## 2018-09-13 RX ORDER — MEPERIDINE HYDROCHLORIDE 25 MG/ML
12.5 INJECTION INTRAMUSCULAR; INTRAVENOUS; SUBCUTANEOUS EVERY 5 MIN PRN
Status: DISCONTINUED | OUTPATIENT
Start: 2018-09-13 | End: 2018-09-13 | Stop reason: HOSPADM

## 2018-09-13 RX ORDER — HYDROMORPHONE HCL 110MG/55ML
0.5 PATIENT CONTROLLED ANALGESIA SYRINGE INTRAVENOUS EVERY 5 MIN PRN
Status: DISCONTINUED | OUTPATIENT
Start: 2018-09-13 | End: 2018-09-13 | Stop reason: HOSPADM

## 2018-09-13 RX ADMIN — SODIUM CHLORIDE, POTASSIUM CHLORIDE, SODIUM LACTATE AND CALCIUM CHLORIDE: 600; 310; 30; 20 INJECTION, SOLUTION INTRAVENOUS at 10:26

## 2018-09-13 RX ADMIN — Medication 0.3 ML: at 09:34

## 2018-09-13 RX ADMIN — BROMFENAC SODIUM 1 DROP: 0.7 SOLUTION/ DROPS OPHTHALMIC at 09:15

## 2018-09-13 RX ADMIN — FENTANYL CITRATE 25 MCG: 50 INJECTION INTRAMUSCULAR; INTRAVENOUS at 10:26

## 2018-09-13 RX ADMIN — PROPOFOL 140 MG: 10 INJECTION, EMULSION INTRAVENOUS at 10:26

## 2018-09-13 RX ADMIN — Medication 0.3 ML: at 09:25

## 2018-09-13 RX ADMIN — Medication 0.3 ML: at 09:15

## 2018-09-13 RX ADMIN — SODIUM CHLORIDE, POTASSIUM CHLORIDE, SODIUM LACTATE AND CALCIUM CHLORIDE: 600; 310; 30; 20 INJECTION, SOLUTION INTRAVENOUS at 09:24

## 2018-09-13 RX ADMIN — LIDOCAINE HYDROCHLORIDE 40 MG: 20 INJECTION, SOLUTION INFILTRATION; PERINEURAL at 10:26

## 2018-09-13 ASSESSMENT — PULMONARY FUNCTION TESTS
PIF_VALUE: 1
PIF_VALUE: 4
PIF_VALUE: 2
PIF_VALUE: 7
PIF_VALUE: 4
PIF_VALUE: 4
PIF_VALUE: 3
PIF_VALUE: 4
PIF_VALUE: 3
PIF_VALUE: 4
PIF_VALUE: 3
PIF_VALUE: 3
PIF_VALUE: 4
PIF_VALUE: 0
PIF_VALUE: 3
PIF_VALUE: 1
PIF_VALUE: 4
PIF_VALUE: 2
PIF_VALUE: 4
PIF_VALUE: 6
PIF_VALUE: 4
PIF_VALUE: 3
PIF_VALUE: 3
PIF_VALUE: 4
PIF_VALUE: 2
PIF_VALUE: 3
PIF_VALUE: 0
PIF_VALUE: 4
PIF_VALUE: 4
PIF_VALUE: 0
PIF_VALUE: 4

## 2018-09-13 ASSESSMENT — PAIN - FUNCTIONAL ASSESSMENT: PAIN_FUNCTIONAL_ASSESSMENT: 0-10

## 2018-09-13 ASSESSMENT — PAIN SCALES - GENERAL
PAINLEVEL_OUTOF10: 0

## 2018-09-13 NOTE — OP NOTE
OPERATIVE NOTE    Patient Name   Date of Birth Age  MRNVivian Laguna   [birth year]   73 y.o.  [id number]       Surgeon        Surgery Date  Janet Burns        9/13/2018       Preoperative Diagnosis: Nuclear Sclerotic Cataract left eye    Postoperative Diagnosis: Nuclear Sclerotic Cataract left eye    Operative Procedure: Phacoemulsification/ Posterior Chamber Intraocular Lens Implantation          Anesthesia:   General/LMA with Topical Tetracaine    Details of Procedure: The patient was brought to the operating room on the operative stretcher in the supine position with the head resting in the head rest.  After appropriate anesthesia monitoring devices were applied, IV sedation was carried out per the anesthesia service. The LMA device was introduced and general anesthesia was induced by the anesthesia service. The operative eye was prepped and draped in the usual sterile fashion. Tobradex drops and Tetracaine drops were administered. A wire lid speculum was then inserted into the operative eye. A paracentesis was then performed using a 15 degree supersharp blade to enter the globe at the limbus, and a .12 mm colibri forceps was used to stabilize the globe. Viscoat was then instilled into the anterior chamber. A temporal clear cornea groove was then created using the 15 degree supersharp blade. The cornea was then entered using the Calros 2.4 mm clearcut keratome blade. The capsulotomy was then performed using a cystotome, and the capsulorrhexis was completed using uttrata forceps. The nucleus of the cataract was then hydrodissected and hydrodelineated using sterile BSS on a 27 gauge cannula. The nucleus of the cataract was then removed using the phacoemilsification/aspiration device. This was performed in a bimanual/CHOP technique. The remaining cortex was then removed using the irrigation/aspiration device. The posterior capsule was polished using the I/A device with CAP/VAC settings.

## 2018-09-13 NOTE — PROGRESS NOTES
Report from [employer] and CRNA. Pt arrived to PACU from OR. See doc flow for vitals and assessment. Will monitor.

## 2018-09-26 ENCOUNTER — HOSPITAL ENCOUNTER (OUTPATIENT)
Dept: CT IMAGING | Age: 73
Discharge: HOME | End: 2018-09-26
Payer: MEDICARE

## 2018-09-26 DIAGNOSIS — C50.411 MALIGNANT NEOPLASM OF UPPER-OUTER QUADRANT OF BOTH BREASTS IN FEMALE, ESTROGEN RECEPTOR NEGATIVE (HCC): ICD-10-CM

## 2018-09-26 DIAGNOSIS — Z17.1 MALIGNANT NEOPLASM OF UPPER-OUTER QUADRANT OF BOTH BREASTS IN FEMALE, ESTROGEN RECEPTOR NEGATIVE (HCC): ICD-10-CM

## 2018-09-26 DIAGNOSIS — C50.412 MALIGNANT NEOPLASM OF UPPER-OUTER QUADRANT OF BOTH BREASTS IN FEMALE, ESTROGEN RECEPTOR NEGATIVE (HCC): ICD-10-CM

## 2018-09-26 PROCEDURE — 6360000004 HC RX CONTRAST MEDICATION: Performed by: INTERNAL MEDICINE

## 2018-09-26 PROCEDURE — 71250 CT THORAX DX C-: CPT

## 2018-09-26 PROCEDURE — 74176 CT ABD & PELVIS W/O CONTRAST: CPT

## 2018-09-26 PROCEDURE — 74177 CT ABD & PELVIS W/CONTRAST: CPT

## 2018-09-26 PROCEDURE — 71260 CT THORAX DX C+: CPT

## 2018-09-26 RX ADMIN — IOHEXOL 50 ML: 240 INJECTION, SOLUTION INTRATHECAL; INTRAVASCULAR; INTRAVENOUS; ORAL at 11:09

## 2018-10-10 ENCOUNTER — ANESTHESIA EVENT (OUTPATIENT)
Dept: OPERATING ROOM | Age: 73
End: 2018-10-10
Payer: MEDICARE

## 2018-10-11 ENCOUNTER — ANESTHESIA (OUTPATIENT)
Dept: OPERATING ROOM | Age: 73
End: 2018-10-11
Payer: MEDICARE

## 2018-10-11 ENCOUNTER — HOSPITAL ENCOUNTER (OUTPATIENT)
Age: 73
Setting detail: OUTPATIENT SURGERY
Discharge: HOME | End: 2018-10-11
Attending: OPHTHALMOLOGY | Admitting: OPHTHALMOLOGY
Payer: MEDICARE

## 2018-10-11 VITALS — DIASTOLIC BLOOD PRESSURE: 71 MMHG | OXYGEN SATURATION: 99 % | SYSTOLIC BLOOD PRESSURE: 131 MMHG

## 2018-10-11 VITALS
BODY MASS INDEX: 31.15 KG/M2 | SYSTOLIC BLOOD PRESSURE: 169 MMHG | HEART RATE: 80 BPM | WEIGHT: 165 LBS | HEIGHT: 61 IN | TEMPERATURE: 96.9 F | DIASTOLIC BLOOD PRESSURE: 91 MMHG | RESPIRATION RATE: 18 BRPM | OXYGEN SATURATION: 96 %

## 2018-10-11 PROCEDURE — 7100000011 HC PHASE II RECOVERY - ADDTL 15 MIN: Performed by: OPHTHALMOLOGY

## 2018-10-11 PROCEDURE — 6360000002 HC RX W HCPCS: Performed by: NURSE ANESTHETIST, CERTIFIED REGISTERED

## 2018-10-11 PROCEDURE — 7100000010 HC PHASE II RECOVERY - FIRST 15 MIN: Performed by: OPHTHALMOLOGY

## 2018-10-11 PROCEDURE — 7100000001 HC PACU RECOVERY - ADDTL 15 MIN: Performed by: OPHTHALMOLOGY

## 2018-10-11 PROCEDURE — 3700000000 HC ANESTHESIA ATTENDED CARE: Performed by: OPHTHALMOLOGY

## 2018-10-11 PROCEDURE — 2500000003 HC RX 250 WO HCPCS: Performed by: ANESTHESIOLOGY

## 2018-10-11 PROCEDURE — V2632 POST CHMBR INTRAOCULAR LENS: HCPCS | Performed by: OPHTHALMOLOGY

## 2018-10-11 PROCEDURE — 3600000003 HC SURGERY LEVEL 3 BASE: Performed by: OPHTHALMOLOGY

## 2018-10-11 PROCEDURE — 6360000002 HC RX W HCPCS: Performed by: OPHTHALMOLOGY

## 2018-10-11 PROCEDURE — 2709999900 HC NON-CHARGEABLE SUPPLY: Performed by: OPHTHALMOLOGY

## 2018-10-11 PROCEDURE — 2580000003 HC RX 258: Performed by: ANESTHESIOLOGY

## 2018-10-11 PROCEDURE — 7100000000 HC PACU RECOVERY - FIRST 15 MIN: Performed by: OPHTHALMOLOGY

## 2018-10-11 PROCEDURE — 6370000000 HC RX 637 (ALT 250 FOR IP): Performed by: OPHTHALMOLOGY

## 2018-10-11 PROCEDURE — 3600000013 HC SURGERY LEVEL 3 ADDTL 15MIN: Performed by: OPHTHALMOLOGY

## 2018-10-11 PROCEDURE — 2500000003 HC RX 250 WO HCPCS: Performed by: NURSE ANESTHETIST, CERTIFIED REGISTERED

## 2018-10-11 PROCEDURE — 2500000003 HC RX 250 WO HCPCS: Performed by: OPHTHALMOLOGY

## 2018-10-11 PROCEDURE — 3700000001 HC ADD 15 MINUTES (ANESTHESIA): Performed by: OPHTHALMOLOGY

## 2018-10-11 DEVICE — ACRYSOF(R) IQ ASPHERIC IOL SP ACRYLIC FOLDABLELENS WULTRASERT(TM) DELIVERY SYSTEM UV WBLUE LIGHT FILTER. 13.0MM LENGTH 6.0MM ANTERIORASYMMETRIC BICONVEX OPTIC PLANAR HAPTICS.
Type: IMPLANTABLE DEVICE | Site: EYE | Status: FUNCTIONAL
Brand: ACRYSOF ULTRASERT

## 2018-10-11 RX ORDER — SODIUM CHLORIDE, SODIUM LACTATE, POTASSIUM CHLORIDE, CALCIUM CHLORIDE 600; 310; 30; 20 MG/100ML; MG/100ML; MG/100ML; MG/100ML
INJECTION, SOLUTION INTRAVENOUS CONTINUOUS
Status: DISCONTINUED | OUTPATIENT
Start: 2018-10-11 | End: 2018-10-11 | Stop reason: HOSPADM

## 2018-10-11 RX ORDER — TOBRAMYCIN AND DEXAMETHASONE 3; 1 MG/ML; MG/ML
SUSPENSION/ DROPS OPHTHALMIC PRN
Status: DISCONTINUED | OUTPATIENT
Start: 2018-10-11 | End: 2018-10-11 | Stop reason: HOSPADM

## 2018-10-11 RX ORDER — OXYCODONE HYDROCHLORIDE AND ACETAMINOPHEN 5; 325 MG/1; MG/1
2 TABLET ORAL PRN
Status: DISCONTINUED | OUTPATIENT
Start: 2018-10-11 | End: 2018-10-11 | Stop reason: HOSPADM

## 2018-10-11 RX ORDER — MORPHINE SULFATE 10 MG/ML
2 INJECTION, SOLUTION INTRAMUSCULAR; INTRAVENOUS EVERY 5 MIN PRN
Status: DISCONTINUED | OUTPATIENT
Start: 2018-10-11 | End: 2018-10-11 | Stop reason: HOSPADM

## 2018-10-11 RX ORDER — ONDANSETRON 2 MG/ML
INJECTION INTRAMUSCULAR; INTRAVENOUS PRN
Status: DISCONTINUED | OUTPATIENT
Start: 2018-10-11 | End: 2018-10-11 | Stop reason: SDUPTHER

## 2018-10-11 RX ORDER — PROMETHAZINE HYDROCHLORIDE 25 MG/ML
6.25 INJECTION, SOLUTION INTRAMUSCULAR; INTRAVENOUS
Status: DISCONTINUED | OUTPATIENT
Start: 2018-10-11 | End: 2018-10-11 | Stop reason: HOSPADM

## 2018-10-11 RX ORDER — MORPHINE SULFATE 10 MG/ML
1 INJECTION, SOLUTION INTRAMUSCULAR; INTRAVENOUS EVERY 5 MIN PRN
Status: DISCONTINUED | OUTPATIENT
Start: 2018-10-11 | End: 2018-10-11 | Stop reason: HOSPADM

## 2018-10-11 RX ORDER — DIPHENHYDRAMINE HYDROCHLORIDE 50 MG/ML
12.5 INJECTION INTRAMUSCULAR; INTRAVENOUS
Status: DISCONTINUED | OUTPATIENT
Start: 2018-10-11 | End: 2018-10-11 | Stop reason: HOSPADM

## 2018-10-11 RX ORDER — ONDANSETRON 2 MG/ML
4 INJECTION INTRAMUSCULAR; INTRAVENOUS
Status: DISCONTINUED | OUTPATIENT
Start: 2018-10-11 | End: 2018-10-11 | Stop reason: HOSPADM

## 2018-10-11 RX ORDER — MEPERIDINE HYDROCHLORIDE 25 MG/ML
12.5 INJECTION INTRAMUSCULAR; INTRAVENOUS; SUBCUTANEOUS EVERY 5 MIN PRN
Status: DISCONTINUED | OUTPATIENT
Start: 2018-10-11 | End: 2018-10-11 | Stop reason: HOSPADM

## 2018-10-11 RX ORDER — PREDNISOLONE ACETATE 10 MG/ML
SUSPENSION/ DROPS OPHTHALMIC PRN
Status: DISCONTINUED | OUTPATIENT
Start: 2018-10-11 | End: 2018-10-11 | Stop reason: HOSPADM

## 2018-10-11 RX ORDER — TETRACAINE HYDROCHLORIDE 5 MG/ML
SOLUTION OPHTHALMIC PRN
Status: DISCONTINUED | OUTPATIENT
Start: 2018-10-11 | End: 2018-10-11 | Stop reason: HOSPADM

## 2018-10-11 RX ORDER — HYDRALAZINE HYDROCHLORIDE 20 MG/ML
5 INJECTION INTRAMUSCULAR; INTRAVENOUS EVERY 10 MIN PRN
Status: DISCONTINUED | OUTPATIENT
Start: 2018-10-11 | End: 2018-10-11 | Stop reason: HOSPADM

## 2018-10-11 RX ORDER — PROPOFOL 10 MG/ML
INJECTION, EMULSION INTRAVENOUS PRN
Status: DISCONTINUED | OUTPATIENT
Start: 2018-10-11 | End: 2018-10-11 | Stop reason: SDUPTHER

## 2018-10-11 RX ORDER — HYDROMORPHONE HCL 110MG/55ML
0.25 PATIENT CONTROLLED ANALGESIA SYRINGE INTRAVENOUS EVERY 5 MIN PRN
Status: DISCONTINUED | OUTPATIENT
Start: 2018-10-11 | End: 2018-10-11 | Stop reason: HOSPADM

## 2018-10-11 RX ORDER — LIDOCAINE HYDROCHLORIDE 20 MG/ML
INJECTION, SOLUTION INFILTRATION; PERINEURAL PRN
Status: DISCONTINUED | OUTPATIENT
Start: 2018-10-11 | End: 2018-10-11 | Stop reason: SDUPTHER

## 2018-10-11 RX ORDER — PREDNISOLONE ACETATE 10 MG/ML
1 SUSPENSION/ DROPS OPHTHALMIC SEE ADMIN INSTRUCTIONS
Status: DISCONTINUED | OUTPATIENT
Start: 2018-10-11 | End: 2018-10-11 | Stop reason: HOSPADM

## 2018-10-11 RX ORDER — HYDROMORPHONE HCL 110MG/55ML
0.5 PATIENT CONTROLLED ANALGESIA SYRINGE INTRAVENOUS EVERY 5 MIN PRN
Status: DISCONTINUED | OUTPATIENT
Start: 2018-10-11 | End: 2018-10-11 | Stop reason: HOSPADM

## 2018-10-11 RX ORDER — OXYCODONE HYDROCHLORIDE AND ACETAMINOPHEN 5; 325 MG/1; MG/1
1 TABLET ORAL PRN
Status: DISCONTINUED | OUTPATIENT
Start: 2018-10-11 | End: 2018-10-11 | Stop reason: HOSPADM

## 2018-10-11 RX ORDER — PILOCARPINE HYDROCHLORIDE 20 MG/ML
SOLUTION/ DROPS OPHTHALMIC PRN
Status: DISCONTINUED | OUTPATIENT
Start: 2018-10-11 | End: 2018-10-11 | Stop reason: HOSPADM

## 2018-10-11 RX ORDER — LABETALOL HYDROCHLORIDE 5 MG/ML
5 INJECTION, SOLUTION INTRAVENOUS EVERY 10 MIN PRN
Status: DISCONTINUED | OUTPATIENT
Start: 2018-10-11 | End: 2018-10-11 | Stop reason: HOSPADM

## 2018-10-11 RX ORDER — TOBRAMYCIN AND DEXAMETHASONE 3; 1 MG/ML; MG/ML
1 SUSPENSION/ DROPS OPHTHALMIC SEE ADMIN INSTRUCTIONS
Status: DISCONTINUED | OUTPATIENT
Start: 2018-10-11 | End: 2018-10-11 | Stop reason: HOSPADM

## 2018-10-11 RX ORDER — SODIUM CHLORIDE, POTASSIUM CHLORIDE, CALCIUM CHLORIDE, MAGNESIUM CHLORIDE, SODIUM ACETATE, AND SODIUM CITRATE 6.4; .75; .48; .3; 3.9; 1.7 MG/ML; MG/ML; MG/ML; MG/ML; MG/ML; MG/ML
SOLUTION IRRIGATION PRN
Status: DISCONTINUED | OUTPATIENT
Start: 2018-10-11 | End: 2018-10-11 | Stop reason: HOSPADM

## 2018-10-11 RX ORDER — LIDOCAINE HYDROCHLORIDE 10 MG/ML
2 INJECTION, SOLUTION INFILTRATION; PERINEURAL
Status: COMPLETED | OUTPATIENT
Start: 2018-10-11 | End: 2018-10-11

## 2018-10-11 RX ADMIN — Medication 0.3 ML: at 09:11

## 2018-10-11 RX ADMIN — Medication 0.3 ML: at 09:26

## 2018-10-11 RX ADMIN — SODIUM CHLORIDE, POTASSIUM CHLORIDE, SODIUM LACTATE AND CALCIUM CHLORIDE: 600; 310; 30; 20 INJECTION, SOLUTION INTRAVENOUS at 09:10

## 2018-10-11 RX ADMIN — BROMFENAC SODIUM 1 DROP: 0.7 SOLUTION/ DROPS OPHTHALMIC at 09:11

## 2018-10-11 RX ADMIN — ONDANSETRON 4 MG: 2 INJECTION, SOLUTION INTRAMUSCULAR; INTRAVENOUS at 10:28

## 2018-10-11 RX ADMIN — LIDOCAINE HYDROCHLORIDE 50 MG: 20 INJECTION, SOLUTION INFILTRATION; PERINEURAL at 10:20

## 2018-10-11 RX ADMIN — LIDOCAINE HYDROCHLORIDE 2 ML: 10 INJECTION, SOLUTION EPIDURAL; INFILTRATION; INTRACAUDAL; PERINEURAL at 09:11

## 2018-10-11 RX ADMIN — PROPOFOL 200 MG: 10 INJECTION, EMULSION INTRAVENOUS at 10:20

## 2018-10-11 RX ADMIN — Medication 0.3 ML: at 09:17

## 2018-10-11 RX ADMIN — TOBRAMYCIN AND DEXAMETHASONE 1 DROP: 3; 1 SUSPENSION/ DROPS OPHTHALMIC at 11:41

## 2018-10-11 ASSESSMENT — PULMONARY FUNCTION TESTS
PIF_VALUE: 12
PIF_VALUE: 3
PIF_VALUE: 14
PIF_VALUE: 4
PIF_VALUE: 21
PIF_VALUE: 14
PIF_VALUE: 18
PIF_VALUE: 14
PIF_VALUE: 20
PIF_VALUE: 14
PIF_VALUE: 15
PIF_VALUE: 14
PIF_VALUE: 10
PIF_VALUE: 3
PIF_VALUE: 3
PIF_VALUE: 4
PIF_VALUE: 3
PIF_VALUE: 3
PIF_VALUE: 7
PIF_VALUE: 13
PIF_VALUE: 1
PIF_VALUE: 3
PIF_VALUE: 3
PIF_VALUE: 6
PIF_VALUE: 2
PIF_VALUE: 18
PIF_VALUE: 18
PIF_VALUE: 4
PIF_VALUE: 3

## 2018-10-11 ASSESSMENT — PAIN SCALES - GENERAL: PAINLEVEL_OUTOF10: 0

## 2018-10-11 ASSESSMENT — PAIN - FUNCTIONAL ASSESSMENT: PAIN_FUNCTIONAL_ASSESSMENT: 0-10

## 2018-11-06 ENCOUNTER — HOSPITAL ENCOUNTER (OUTPATIENT)
Dept: WOMENS IMAGING | Age: 73
Discharge: HOME | End: 2018-11-06
Payer: MEDICARE

## 2018-11-06 ENCOUNTER — TELEPHONE (OUTPATIENT)
Dept: WOMENS IMAGING | Age: 73
End: 2018-11-06

## 2018-11-06 ENCOUNTER — TELEPHONE (OUTPATIENT)
Dept: FAMILY MEDICINE CLINIC | Age: 73
End: 2018-11-06

## 2018-11-06 DIAGNOSIS — N63.41 LUMP IN CENTRAL PORTION OF RIGHT BREAST: ICD-10-CM

## 2018-11-06 DIAGNOSIS — R92.8 ABNORMAL MAMMOGRAM: ICD-10-CM

## 2018-11-06 PROCEDURE — G0279 TOMOSYNTHESIS, MAMMO: HCPCS

## 2018-11-06 PROCEDURE — 76642 ULTRASOUND BREAST LIMITED: CPT

## 2018-11-06 NOTE — PROGRESS NOTES
Patient Name:  Sandy Reese    Referring Physician: Dr. Terry Ortez    Procedure: U/S Core Bx    Right breast    Date of biopsy: 11/13/18    Is the patient currently being treated for any medical conditions? yes - Right breast cancer, diagnosed in 2013. Pt still on chemo. Patient taking blood thinners: yes - Xarelto for hx of blood clots. Prescribing MD rivera for pt to stop one day prior to biopsy. Notified pt of this. Pt verbalized understanding. Allergy to latex:  no    Medicine allergies: yes - Codeine    Special Instructions: Stop Xarelto one day prior per policy. Reviewed pre and post biopsy instructions/information with patient. Pt verbalized understanding.      Romayne Nanas, RN  11/6/2018  2:32 PM

## 2018-11-13 ENCOUNTER — HOSPITAL ENCOUNTER (OUTPATIENT)
Dept: WOMENS IMAGING | Age: 73
Discharge: HOME | End: 2018-11-13
Payer: MEDICARE

## 2018-11-13 DIAGNOSIS — R92.8 ABNORMAL MAMMOGRAM: ICD-10-CM

## 2018-11-13 DIAGNOSIS — R92.8 ABNORMAL MAMMOGRAM OF RIGHT BREAST: ICD-10-CM

## 2018-11-13 PROCEDURE — 88360 TUMOR IMMUNOHISTOCHEM/MANUAL: CPT

## 2018-11-13 PROCEDURE — 77065 DX MAMMO INCL CAD UNI: CPT

## 2018-11-13 PROCEDURE — 88341 IMHCHEM/IMCYTCHM EA ADD ANTB: CPT

## 2018-11-13 PROCEDURE — 19084 BX BREAST ADD LESION US IMAG: CPT

## 2018-11-13 PROCEDURE — 88342 IMHCHEM/IMCYTCHM 1ST ANTB: CPT

## 2018-11-13 PROCEDURE — 19083 BX BREAST 1ST LESION US IMAG: CPT

## 2018-11-13 PROCEDURE — 88305 TISSUE EXAM BY PATHOLOGIST: CPT

## 2018-11-16 ENCOUNTER — TELEPHONE (OUTPATIENT)
Dept: WOMENS IMAGING | Age: 73
End: 2018-11-16

## 2018-11-26 RX ORDER — OMEPRAZOLE 20 MG/1
CAPSULE, DELAYED RELEASE ORAL
Qty: 90 CAPSULE | Refills: 1 | Status: SHIPPED | OUTPATIENT
Start: 2018-11-26 | End: 2019-05-15 | Stop reason: SDUPTHER

## 2018-11-28 ENCOUNTER — TELEPHONE (OUTPATIENT)
Dept: SURGERY | Age: 73
End: 2018-11-28

## 2018-11-29 ENCOUNTER — INITIAL CONSULT (OUTPATIENT)
Dept: SURGERY | Age: 73
End: 2018-11-29
Payer: MEDICARE

## 2018-11-29 ENCOUNTER — PREP FOR PROCEDURE (OUTPATIENT)
Dept: SURGERY | Age: 73
End: 2018-11-29

## 2018-11-29 VITALS
HEIGHT: 61 IN | BODY MASS INDEX: 31.34 KG/M2 | DIASTOLIC BLOOD PRESSURE: 80 MMHG | SYSTOLIC BLOOD PRESSURE: 126 MMHG | WEIGHT: 166 LBS

## 2018-11-29 DIAGNOSIS — Z17.1 MALIGNANT NEOPLASM OF UPPER-OUTER QUADRANT OF RIGHT BREAST IN FEMALE, ESTROGEN RECEPTOR NEGATIVE (HCC): Primary | ICD-10-CM

## 2018-11-29 DIAGNOSIS — C50.411 MALIGNANT NEOPLASM OF UPPER-OUTER QUADRANT OF RIGHT BREAST IN FEMALE, ESTROGEN RECEPTOR NEGATIVE (HCC): Primary | ICD-10-CM

## 2018-11-29 PROCEDURE — 3017F COLORECTAL CA SCREEN DOC REV: CPT | Performed by: SURGERY

## 2018-11-29 PROCEDURE — 1036F TOBACCO NON-USER: CPT | Performed by: SURGERY

## 2018-11-29 PROCEDURE — 4040F PNEUMOC VAC/ADMIN/RCVD: CPT | Performed by: SURGERY

## 2018-11-29 PROCEDURE — G8417 CALC BMI ABV UP PARAM F/U: HCPCS | Performed by: SURGERY

## 2018-11-29 PROCEDURE — G8399 PT W/DXA RESULTS DOCUMENT: HCPCS | Performed by: SURGERY

## 2018-11-29 PROCEDURE — 1090F PRES/ABSN URINE INCON ASSESS: CPT | Performed by: SURGERY

## 2018-11-29 PROCEDURE — 99203 OFFICE O/P NEW LOW 30 MIN: CPT | Performed by: SURGERY

## 2018-11-29 PROCEDURE — G8427 DOCREV CUR MEDS BY ELIG CLIN: HCPCS | Performed by: SURGERY

## 2018-11-29 PROCEDURE — 1123F ACP DISCUSS/DSCN MKR DOCD: CPT | Performed by: SURGERY

## 2018-11-29 PROCEDURE — 1101F PT FALLS ASSESS-DOCD LE1/YR: CPT | Performed by: SURGERY

## 2018-11-29 PROCEDURE — G8484 FLU IMMUNIZE NO ADMIN: HCPCS | Performed by: SURGERY

## 2018-11-29 RX ORDER — SODIUM CHLORIDE 0.9 % (FLUSH) 0.9 %
10 SYRINGE (ML) INJECTION PRN
Status: CANCELLED | OUTPATIENT
Start: 2018-11-29

## 2018-11-29 RX ORDER — SODIUM CHLORIDE 0.9 % (FLUSH) 0.9 %
10 SYRINGE (ML) INJECTION EVERY 12 HOURS SCHEDULED
Status: CANCELLED | OUTPATIENT
Start: 2018-11-29

## 2018-11-29 NOTE — PROGRESS NOTES
10/11/2018    PHACO EMULSIFICATION OF CATARACT WITH  INTRAOCULAR LENS IMPLANT RIGHT EYE performed by Bola Lobato MD at [facility] History   Problem Relation Age of Onset    Arthritis Mother     Asthma Mother     Stroke Mother     Early Death Father     Arthritis Sister     Cancer Sister     Cancer Brother      Social History     Social History    Marital status:      Spouse name: N/A    Number of children: N/A    Years of education: N/A     Occupational History    Not on file. Social History Main Topics    Smoking status: Former Smoker     Packs/day: 0.02     Years: 5.00     Types: Cigarettes     Quit date: 1/1/1999    Smokeless tobacco: Never Used    Alcohol use No    Drug use: No    Sexual activity: Not on file     Other Topics Concern    Not on file     Social History Narrative    No narrative on file          Vitals:    11/29/18 1009   BP: 126/80   Weight: 166 lb (75.3 kg)   Height: 5' 0.98\" (1.549 m)     Body mass index is 31.38 kg/m². Wt Readings from Last 3 Encounters:   11/29/18 166 lb (75.3 kg)   10/11/18 165 lb (74.8 kg)   09/13/18 165 lb (74.8 kg)     BP Readings from Last 3 Encounters:   11/29/18 126/80   10/11/18 131/71   10/11/18 (!) 169/91        ROS:  As per HPI, otherwise reviewed ×10 systems and negative    PE:  Constitutional:  Well developed, well nourished, no acute distress, non-toxic appearance   Eyes:  PERRL, conjunctiva normal   HENT:  Atraumatic, external ears normal, nose normal. Neck- normal range of motion, no tenderness, supple   Respiratory:  No respiratory distress, normal breath sounds, no rales, no wheezing   Cardiovascular:  Normal rate, normal rhythm  Breast:  There is a large mass in the right upper outer quadrant of her breast.  It is firm and mobile.   Left side is unremarkable  Lymphatic:  Right axillary lymphadenopathy noted   Neurologic:  Alert & oriented x 3, no focal deficits noted   Psychiatric:  Speech and behavior appropriate

## 2018-11-30 DIAGNOSIS — R06.09 DYSPNEA ON EXERTION: ICD-10-CM

## 2018-11-30 RX ORDER — RIVAROXABAN 20 MG/1
TABLET, FILM COATED ORAL
Qty: 30 TABLET | Refills: 5 | Status: SHIPPED | OUTPATIENT
Start: 2018-11-30 | End: 2019-06-17 | Stop reason: SDUPTHER

## 2018-12-03 ENCOUNTER — TELEPHONE (OUTPATIENT)
Dept: SURGERY | Age: 73
End: 2018-12-03

## 2018-12-06 ENCOUNTER — ANESTHESIA EVENT (OUTPATIENT)
Dept: OPERATING ROOM | Age: 73
DRG: 583 | End: 2018-12-06
Payer: MEDICARE

## 2018-12-07 ENCOUNTER — ANESTHESIA (OUTPATIENT)
Dept: OPERATING ROOM | Age: 73
DRG: 583 | End: 2018-12-07
Payer: MEDICARE

## 2018-12-07 ENCOUNTER — HOSPITAL ENCOUNTER (INPATIENT)
Age: 73
LOS: 1 days | Discharge: HOME | DRG: 583 | End: 2018-12-08
Attending: SURGERY | Admitting: SURGERY
Payer: MEDICARE

## 2018-12-07 VITALS
OXYGEN SATURATION: 100 % | RESPIRATION RATE: 4 BRPM | SYSTOLIC BLOOD PRESSURE: 128 MMHG | DIASTOLIC BLOOD PRESSURE: 69 MMHG

## 2018-12-07 PROBLEM — C50.919 BREAST CANCER IN FEMALE (HCC): Status: ACTIVE | Noted: 2018-12-07

## 2018-12-07 LAB
ABO/RH: NORMAL
ANION GAP SERPL CALCULATED.3IONS-SCNC: 12 MMOL/L (ref 3–16)
ANTIBODY SCREEN: NORMAL
BUN BLDV-MCNC: 16 MG/DL (ref 7–20)
CALCIUM SERPL-MCNC: 8.8 MG/DL (ref 8.3–10.6)
CHLORIDE BLD-SCNC: 99 MMOL/L (ref 99–110)
CO2: 25 MMOL/L (ref 21–32)
CREAT SERPL-MCNC: 0.9 MG/DL (ref 0.6–1.2)
GFR AFRICAN AMERICAN: >60
GFR NON-AFRICAN AMERICAN: >60
GLUCOSE BLD-MCNC: 111 MG/DL (ref 70–99)
HCT VFR BLD CALC: 32.1 % (ref 36–48)
HEMOGLOBIN: 10.6 G/DL (ref 12–16)
MCH RBC QN AUTO: 28.2 PG (ref 26–34)
MCHC RBC AUTO-ENTMCNC: 33.1 G/DL (ref 31–36)
MCV RBC AUTO: 85.3 FL (ref 80–100)
PDW BLD-RTO: 21 % (ref 12.4–15.4)
PLATELET # BLD: 267 K/UL (ref 135–450)
PMV BLD AUTO: 6.5 FL (ref 5–10.5)
POTASSIUM SERPL-SCNC: 4.2 MMOL/L (ref 3.5–5.1)
RBC # BLD: 3.77 M/UL (ref 4–5.2)
SODIUM BLD-SCNC: 136 MMOL/L (ref 136–145)
WBC # BLD: 3.9 K/UL (ref 4–11)

## 2018-12-07 PROCEDURE — 3600000002 HC SURGERY LEVEL 2 BASE: Performed by: SURGERY

## 2018-12-07 PROCEDURE — 2709999900 HC NON-CHARGEABLE SUPPLY: Performed by: SURGERY

## 2018-12-07 PROCEDURE — 2500000003 HC RX 250 WO HCPCS: Performed by: NURSE ANESTHETIST, CERTIFIED REGISTERED

## 2018-12-07 PROCEDURE — 6360000002 HC RX W HCPCS: Performed by: ANESTHESIOLOGY

## 2018-12-07 PROCEDURE — 6360000002 HC RX W HCPCS: Performed by: NURSE ANESTHETIST, CERTIFIED REGISTERED

## 2018-12-07 PROCEDURE — 1200000000 HC SEMI PRIVATE

## 2018-12-07 PROCEDURE — 86901 BLOOD TYPING SEROLOGIC RH(D): CPT

## 2018-12-07 PROCEDURE — 93005 ELECTROCARDIOGRAM TRACING: CPT | Performed by: ANESTHESIOLOGY

## 2018-12-07 PROCEDURE — 86850 RBC ANTIBODY SCREEN: CPT

## 2018-12-07 PROCEDURE — 85027 COMPLETE CBC AUTOMATED: CPT

## 2018-12-07 PROCEDURE — 36415 COLL VENOUS BLD VENIPUNCTURE: CPT

## 2018-12-07 PROCEDURE — 6370000000 HC RX 637 (ALT 250 FOR IP): Performed by: SURGERY

## 2018-12-07 PROCEDURE — 3700000001 HC ADD 15 MINUTES (ANESTHESIA): Performed by: SURGERY

## 2018-12-07 PROCEDURE — 80048 BASIC METABOLIC PNL TOTAL CA: CPT

## 2018-12-07 PROCEDURE — 2580000003 HC RX 258: Performed by: ANESTHESIOLOGY

## 2018-12-07 PROCEDURE — 93010 ELECTROCARDIOGRAM REPORT: CPT | Performed by: INTERNAL MEDICINE

## 2018-12-07 PROCEDURE — 94760 N-INVAS EAR/PLS OXIMETRY 1: CPT

## 2018-12-07 PROCEDURE — 3600000012 HC SURGERY LEVEL 2 ADDTL 15MIN: Performed by: SURGERY

## 2018-12-07 PROCEDURE — 19307 MAST MOD RAD: CPT | Performed by: SURGERY

## 2018-12-07 PROCEDURE — 2580000003 HC RX 258: Performed by: SURGERY

## 2018-12-07 PROCEDURE — C1729 CATH, DRAINAGE: HCPCS | Performed by: SURGERY

## 2018-12-07 PROCEDURE — 7100000000 HC PACU RECOVERY - FIRST 15 MIN: Performed by: SURGERY

## 2018-12-07 PROCEDURE — 6360000002 HC RX W HCPCS: Performed by: SURGERY

## 2018-12-07 PROCEDURE — 0HTT0ZZ RESECTION OF RIGHT BREAST, OPEN APPROACH: ICD-10-PCS | Performed by: SURGERY

## 2018-12-07 PROCEDURE — 07T50ZZ RESECTION OF RIGHT AXILLARY LYMPHATIC, OPEN APPROACH: ICD-10-PCS | Performed by: SURGERY

## 2018-12-07 PROCEDURE — 86900 BLOOD TYPING SEROLOGIC ABO: CPT

## 2018-12-07 PROCEDURE — 88309 TISSUE EXAM BY PATHOLOGIST: CPT

## 2018-12-07 PROCEDURE — 88342 IMHCHEM/IMCYTCHM 1ST ANTB: CPT

## 2018-12-07 PROCEDURE — 3700000000 HC ANESTHESIA ATTENDED CARE: Performed by: SURGERY

## 2018-12-07 PROCEDURE — 7100000001 HC PACU RECOVERY - ADDTL 15 MIN: Performed by: SURGERY

## 2018-12-07 PROCEDURE — 94664 DEMO&/EVAL PT USE INHALER: CPT

## 2018-12-07 PROCEDURE — 2500000003 HC RX 250 WO HCPCS: Performed by: ANESTHESIOLOGY

## 2018-12-07 PROCEDURE — 88341 IMHCHEM/IMCYTCHM EA ADD ANTB: CPT

## 2018-12-07 PROCEDURE — 94150 VITAL CAPACITY TEST: CPT

## 2018-12-07 PROCEDURE — 2500000003 HC RX 250 WO HCPCS: Performed by: SURGERY

## 2018-12-07 RX ORDER — LIDOCAINE HYDROCHLORIDE 20 MG/ML
INJECTION, SOLUTION EPIDURAL; INFILTRATION; INTRACAUDAL; PERINEURAL PRN
Status: DISCONTINUED | OUTPATIENT
Start: 2018-12-07 | End: 2018-12-07 | Stop reason: SDUPTHER

## 2018-12-07 RX ORDER — BUPIVACAINE HYDROCHLORIDE 5 MG/ML
INJECTION, SOLUTION PERINEURAL PRN
Status: DISCONTINUED | OUTPATIENT
Start: 2018-12-07 | End: 2018-12-07 | Stop reason: HOSPADM

## 2018-12-07 RX ORDER — HYDROMORPHONE HCL 110MG/55ML
0.5 PATIENT CONTROLLED ANALGESIA SYRINGE INTRAVENOUS
Status: DISCONTINUED | OUTPATIENT
Start: 2018-12-07 | End: 2018-12-08 | Stop reason: HOSPADM

## 2018-12-07 RX ORDER — ALBUTEROL SULFATE 90 UG/1
2 AEROSOL, METERED RESPIRATORY (INHALATION) EVERY 4 HOURS PRN
Status: DISCONTINUED | OUTPATIENT
Start: 2018-12-07 | End: 2018-12-08 | Stop reason: HOSPADM

## 2018-12-07 RX ORDER — ACETAMINOPHEN 325 MG/1
650 TABLET ORAL EVERY 4 HOURS PRN
Status: DISCONTINUED | OUTPATIENT
Start: 2018-12-07 | End: 2018-12-08 | Stop reason: HOSPADM

## 2018-12-07 RX ORDER — MAGNESIUM HYDROXIDE 1200 MG/15ML
LIQUID ORAL PRN
Status: DISCONTINUED | OUTPATIENT
Start: 2018-12-07 | End: 2018-12-07 | Stop reason: HOSPADM

## 2018-12-07 RX ORDER — OXYCODONE HYDROCHLORIDE 5 MG/1
5 TABLET ORAL EVERY 4 HOURS PRN
Status: DISCONTINUED | OUTPATIENT
Start: 2018-12-07 | End: 2018-12-08 | Stop reason: HOSPADM

## 2018-12-07 RX ORDER — MECLIZINE HYDROCHLORIDE 25 MG/1
25 TABLET ORAL 3 TIMES DAILY PRN
Status: DISCONTINUED | OUTPATIENT
Start: 2018-12-07 | End: 2018-12-08 | Stop reason: HOSPADM

## 2018-12-07 RX ORDER — DEXAMETHASONE SODIUM PHOSPHATE 4 MG/ML
INJECTION, SOLUTION INTRA-ARTICULAR; INTRALESIONAL; INTRAMUSCULAR; INTRAVENOUS; SOFT TISSUE PRN
Status: DISCONTINUED | OUTPATIENT
Start: 2018-12-07 | End: 2018-12-07 | Stop reason: SDUPTHER

## 2018-12-07 RX ORDER — MEPERIDINE HYDROCHLORIDE 25 MG/ML
12.5 INJECTION INTRAMUSCULAR; INTRAVENOUS; SUBCUTANEOUS EVERY 5 MIN PRN
Status: DISCONTINUED | OUTPATIENT
Start: 2018-12-07 | End: 2018-12-07 | Stop reason: HOSPADM

## 2018-12-07 RX ORDER — PANTOPRAZOLE SODIUM 40 MG/1
40 TABLET, DELAYED RELEASE ORAL
Status: DISCONTINUED | OUTPATIENT
Start: 2018-12-08 | End: 2018-12-08 | Stop reason: HOSPADM

## 2018-12-07 RX ORDER — OXYCODONE HYDROCHLORIDE AND ACETAMINOPHEN 5; 325 MG/1; MG/1
1 TABLET ORAL PRN
Status: DISCONTINUED | OUTPATIENT
Start: 2018-12-07 | End: 2018-12-07 | Stop reason: HOSPADM

## 2018-12-07 RX ORDER — CLOTRIMAZOLE AND BETAMETHASONE DIPROPIONATE 10; .64 MG/G; MG/G
CREAM TOPICAL 2 TIMES DAILY
Status: DISCONTINUED | OUTPATIENT
Start: 2018-12-07 | End: 2018-12-08 | Stop reason: HOSPADM

## 2018-12-07 RX ORDER — LOSARTAN POTASSIUM 25 MG/1
50 TABLET ORAL DAILY
Status: DISCONTINUED | OUTPATIENT
Start: 2018-12-08 | End: 2018-12-08 | Stop reason: HOSPADM

## 2018-12-07 RX ORDER — POTASSIUM CHLORIDE 20 MEQ/1
20 TABLET, EXTENDED RELEASE ORAL 2 TIMES DAILY
Status: DISCONTINUED | OUTPATIENT
Start: 2018-12-08 | End: 2018-12-08 | Stop reason: HOSPADM

## 2018-12-07 RX ORDER — FENTANYL CITRATE 50 UG/ML
25 INJECTION, SOLUTION INTRAMUSCULAR; INTRAVENOUS EVERY 5 MIN PRN
Status: COMPLETED | OUTPATIENT
Start: 2018-12-07 | End: 2018-12-07

## 2018-12-07 RX ORDER — HYDROMORPHONE HCL 110MG/55ML
0.5 PATIENT CONTROLLED ANALGESIA SYRINGE INTRAVENOUS EVERY 5 MIN PRN
Status: DISCONTINUED | OUTPATIENT
Start: 2018-12-07 | End: 2018-12-07 | Stop reason: HOSPADM

## 2018-12-07 RX ORDER — ONDANSETRON 2 MG/ML
4 INJECTION INTRAMUSCULAR; INTRAVENOUS
Status: DISCONTINUED | OUTPATIENT
Start: 2018-12-07 | End: 2018-12-07 | Stop reason: HOSPADM

## 2018-12-07 RX ORDER — HYDROCHLOROTHIAZIDE 25 MG/1
12.5 TABLET ORAL DAILY
Status: DISCONTINUED | OUTPATIENT
Start: 2018-12-08 | End: 2018-12-08 | Stop reason: HOSPADM

## 2018-12-07 RX ORDER — SODIUM CHLORIDE 0.9 % (FLUSH) 0.9 %
10 SYRINGE (ML) INJECTION PRN
Status: DISCONTINUED | OUTPATIENT
Start: 2018-12-07 | End: 2018-12-07 | Stop reason: HOSPADM

## 2018-12-07 RX ORDER — OXYCODONE HYDROCHLORIDE AND ACETAMINOPHEN 5; 325 MG/1; MG/1
2 TABLET ORAL PRN
Status: DISCONTINUED | OUTPATIENT
Start: 2018-12-07 | End: 2018-12-07 | Stop reason: HOSPADM

## 2018-12-07 RX ORDER — SODIUM CHLORIDE 0.9 % (FLUSH) 0.9 %
10 SYRINGE (ML) INJECTION EVERY 12 HOURS SCHEDULED
Status: DISCONTINUED | OUTPATIENT
Start: 2018-12-07 | End: 2018-12-07 | Stop reason: HOSPADM

## 2018-12-07 RX ORDER — MIDAZOLAM HYDROCHLORIDE 1 MG/ML
2 INJECTION INTRAMUSCULAR; INTRAVENOUS ONCE
Status: COMPLETED | OUTPATIENT
Start: 2018-12-07 | End: 2018-12-07

## 2018-12-07 RX ORDER — LABETALOL HYDROCHLORIDE 5 MG/ML
INJECTION, SOLUTION INTRAVENOUS PRN
Status: DISCONTINUED | OUTPATIENT
Start: 2018-12-07 | End: 2018-12-07 | Stop reason: SDUPTHER

## 2018-12-07 RX ORDER — FENTANYL 100 UG/H
1 PATCH TRANSDERMAL
Status: ON HOLD | COMMUNITY
End: 2020-01-01 | Stop reason: HOSPADM

## 2018-12-07 RX ORDER — CYCLOBENZAPRINE HCL 10 MG
10 TABLET ORAL 3 TIMES DAILY PRN
Status: DISCONTINUED | OUTPATIENT
Start: 2018-12-07 | End: 2018-12-08 | Stop reason: HOSPADM

## 2018-12-07 RX ORDER — OXYCODONE HYDROCHLORIDE 5 MG/1
10 TABLET ORAL EVERY 4 HOURS PRN
Status: DISCONTINUED | OUTPATIENT
Start: 2018-12-07 | End: 2018-12-08 | Stop reason: HOSPADM

## 2018-12-07 RX ORDER — PROPOFOL 10 MG/ML
INJECTION, EMULSION INTRAVENOUS PRN
Status: DISCONTINUED | OUTPATIENT
Start: 2018-12-07 | End: 2018-12-07 | Stop reason: SDUPTHER

## 2018-12-07 RX ORDER — HYDROMORPHONE HCL 110MG/55ML
PATIENT CONTROLLED ANALGESIA SYRINGE INTRAVENOUS PRN
Status: DISCONTINUED | OUTPATIENT
Start: 2018-12-07 | End: 2018-12-07 | Stop reason: SDUPTHER

## 2018-12-07 RX ORDER — DEXTROSE AND SODIUM CHLORIDE 5; .9 G/100ML; G/100ML
INJECTION, SOLUTION INTRAVENOUS CONTINUOUS
Status: DISCONTINUED | OUTPATIENT
Start: 2018-12-07 | End: 2018-12-08 | Stop reason: HOSPADM

## 2018-12-07 RX ORDER — HYDROMORPHONE HCL 110MG/55ML
0.25 PATIENT CONTROLLED ANALGESIA SYRINGE INTRAVENOUS EVERY 5 MIN PRN
Status: DISCONTINUED | OUTPATIENT
Start: 2018-12-07 | End: 2018-12-07 | Stop reason: HOSPADM

## 2018-12-07 RX ORDER — SODIUM CHLORIDE, SODIUM LACTATE, POTASSIUM CHLORIDE, CALCIUM CHLORIDE 600; 310; 30; 20 MG/100ML; MG/100ML; MG/100ML; MG/100ML
INJECTION, SOLUTION INTRAVENOUS CONTINUOUS
Status: DISCONTINUED | OUTPATIENT
Start: 2018-12-07 | End: 2018-12-07

## 2018-12-07 RX ORDER — HYDRALAZINE HYDROCHLORIDE 20 MG/ML
5 INJECTION INTRAMUSCULAR; INTRAVENOUS EVERY 10 MIN PRN
Status: DISCONTINUED | OUTPATIENT
Start: 2018-12-07 | End: 2018-12-07 | Stop reason: HOSPADM

## 2018-12-07 RX ORDER — ONDANSETRON 2 MG/ML
4 INJECTION INTRAMUSCULAR; INTRAVENOUS EVERY 4 HOURS PRN
Status: DISCONTINUED | OUTPATIENT
Start: 2018-12-07 | End: 2018-12-08 | Stop reason: HOSPADM

## 2018-12-07 RX ORDER — ONDANSETRON 2 MG/ML
INJECTION INTRAMUSCULAR; INTRAVENOUS PRN
Status: DISCONTINUED | OUTPATIENT
Start: 2018-12-07 | End: 2018-12-07 | Stop reason: SDUPTHER

## 2018-12-07 RX ORDER — LOSARTAN POTASSIUM AND HYDROCHLOROTHIAZIDE 12.5; 5 MG/1; MG/1
1 TABLET ORAL DAILY
Status: DISCONTINUED | OUTPATIENT
Start: 2018-12-08 | End: 2018-12-07

## 2018-12-07 RX ADMIN — LIDOCAINE HYDROCHLORIDE 60 MG: 20 INJECTION, SOLUTION EPIDURAL; INFILTRATION; INTRACAUDAL; PERINEURAL at 10:40

## 2018-12-07 RX ADMIN — HYDROMORPHONE HYDROCHLORIDE 0.5 MG: 2 INJECTION INTRAMUSCULAR; INTRAVENOUS; SUBCUTANEOUS at 12:31

## 2018-12-07 RX ADMIN — SODIUM CHLORIDE, POTASSIUM CHLORIDE, SODIUM LACTATE AND CALCIUM CHLORIDE: 600; 310; 30; 20 INJECTION, SOLUTION INTRAVENOUS at 09:45

## 2018-12-07 RX ADMIN — PROPOFOL 200 MG: 10 INJECTION, EMULSION INTRAVENOUS at 10:40

## 2018-12-07 RX ADMIN — FENTANYL CITRATE 50 MCG: 50 INJECTION INTRAMUSCULAR; INTRAVENOUS at 10:42

## 2018-12-07 RX ADMIN — HYDROMORPHONE HYDROCHLORIDE 0.5 MG: 2 INJECTION, SOLUTION INTRAMUSCULAR; INTRAVENOUS; SUBCUTANEOUS at 11:06

## 2018-12-07 RX ADMIN — OXYCODONE HYDROCHLORIDE 10 MG: 5 TABLET ORAL at 21:21

## 2018-12-07 RX ADMIN — HYDROMORPHONE HYDROCHLORIDE 0.5 MG: 2 INJECTION INTRAMUSCULAR; INTRAVENOUS; SUBCUTANEOUS at 12:26

## 2018-12-07 RX ADMIN — ONDANSETRON 4 MG: 2 INJECTION, SOLUTION INTRAMUSCULAR; INTRAVENOUS at 10:50

## 2018-12-07 RX ADMIN — SODIUM CHLORIDE, POTASSIUM CHLORIDE, SODIUM LACTATE AND CALCIUM CHLORIDE: 600; 310; 30; 20 INJECTION, SOLUTION INTRAVENOUS at 10:01

## 2018-12-07 RX ADMIN — FENTANYL CITRATE 50 MCG: 50 INJECTION INTRAMUSCULAR; INTRAVENOUS at 10:37

## 2018-12-07 RX ADMIN — LIDOCAINE HYDROCHLORIDE 0.1 ML: 10 INJECTION, SOLUTION EPIDURAL; INFILTRATION; INTRACAUDAL; PERINEURAL at 10:01

## 2018-12-07 RX ADMIN — VANCOMYCIN HYDROCHLORIDE 1000 MG: 1 INJECTION, POWDER, LYOPHILIZED, FOR SOLUTION INTRAVENOUS at 10:34

## 2018-12-07 RX ADMIN — FENTANYL CITRATE 50 MCG: 50 INJECTION INTRAMUSCULAR; INTRAVENOUS at 10:50

## 2018-12-07 RX ADMIN — MIDAZOLAM HYDROCHLORIDE 2 MG: 1 INJECTION, SOLUTION INTRAMUSCULAR; INTRAVENOUS at 10:10

## 2018-12-07 RX ADMIN — DEXTROSE AND SODIUM CHLORIDE: 5; 900 INJECTION, SOLUTION INTRAVENOUS at 16:52

## 2018-12-07 RX ADMIN — HYDROMORPHONE HYDROCHLORIDE 0.5 MG: 2 INJECTION, SOLUTION INTRAMUSCULAR; INTRAVENOUS; SUBCUTANEOUS at 11:16

## 2018-12-07 RX ADMIN — DEXAMETHASONE SODIUM PHOSPHATE 10 MG: 4 INJECTION, SOLUTION INTRAMUSCULAR; INTRAVENOUS at 10:50

## 2018-12-07 RX ADMIN — MIDAZOLAM HYDROCHLORIDE 1 MG: 1 INJECTION, SOLUTION INTRAMUSCULAR; INTRAVENOUS at 10:37

## 2018-12-07 RX ADMIN — OXYCODONE HYDROCHLORIDE 10 MG: 5 TABLET ORAL at 17:14

## 2018-12-07 RX ADMIN — FENTANYL CITRATE 50 MCG: 50 INJECTION INTRAMUSCULAR; INTRAVENOUS at 10:56

## 2018-12-07 RX ADMIN — FENTANYL CITRATE 50 MCG: 50 INJECTION INTRAMUSCULAR; INTRAVENOUS at 10:59

## 2018-12-07 RX ADMIN — LABETALOL HYDROCHLORIDE 2.5 MG: 5 INJECTION, SOLUTION INTRAVENOUS at 10:59

## 2018-12-07 ASSESSMENT — PULMONARY FUNCTION TESTS
PIF_VALUE: 4
PIF_VALUE: 4
PIF_VALUE: 5
PIF_VALUE: 5
PIF_VALUE: 4
PIF_VALUE: 4
PIF_VALUE: 13
PIF_VALUE: 3
PIF_VALUE: 3
PIF_VALUE: 4
PIF_VALUE: 4
PIF_VALUE: 5
PIF_VALUE: 4
PIF_VALUE: 4
PIF_VALUE: 3
PIF_VALUE: 4
PIF_VALUE: 4
PIF_VALUE: 3
PIF_VALUE: 5
PIF_VALUE: 4
PIF_VALUE: 3
PIF_VALUE: 4
PIF_VALUE: 5
PIF_VALUE: 3
PIF_VALUE: 3
PIF_VALUE: 15
PIF_VALUE: 3
PIF_VALUE: 14
PIF_VALUE: 4
PIF_VALUE: 4
PIF_VALUE: 5
PIF_VALUE: 7
PIF_VALUE: 4
PIF_VALUE: 4
PIF_VALUE: 7
PIF_VALUE: 19
PIF_VALUE: 4
PIF_VALUE: 4
PIF_VALUE: 1
PIF_VALUE: 5
PIF_VALUE: 4
PIF_VALUE: 5
PIF_VALUE: 18
PIF_VALUE: 4
PIF_VALUE: 14
PIF_VALUE: 3
PIF_VALUE: 4
PIF_VALUE: 5
PIF_VALUE: 1
PIF_VALUE: 3
PIF_VALUE: 14
PIF_VALUE: 5
PIF_VALUE: 8
PIF_VALUE: 4
PIF_VALUE: 18
PIF_VALUE: 4
PIF_VALUE: 4
PIF_VALUE: 19
PIF_VALUE: 4
PIF_VALUE: 4
PIF_VALUE: 7
PIF_VALUE: 4
PIF_VALUE: 5
PIF_VALUE: 4
PIF_VALUE: 6
PIF_VALUE: 3
PIF_VALUE: 4
PIF_VALUE: 4
PIF_VALUE: 2
PIF_VALUE: 4
PIF_VALUE: 3
PIF_VALUE: 5
PIF_VALUE: 6
PIF_VALUE: 4
PIF_VALUE: 3
PIF_VALUE: 4
PIF_VALUE: 6
PIF_VALUE: 3

## 2018-12-07 ASSESSMENT — PAIN SCALES - GENERAL
PAINLEVEL_OUTOF10: 0
PAINLEVEL_OUTOF10: 4
PAINLEVEL_OUTOF10: 4
PAINLEVEL_OUTOF10: 5
PAINLEVEL_OUTOF10: 7
PAINLEVEL_OUTOF10: 2
PAINLEVEL_OUTOF10: 5
PAINLEVEL_OUTOF10: 2
PAINLEVEL_OUTOF10: 7
PAINLEVEL_OUTOF10: 0
PAINLEVEL_OUTOF10: 0
PAINLEVEL_OUTOF10: 7

## 2018-12-07 ASSESSMENT — PAIN - FUNCTIONAL ASSESSMENT: PAIN_FUNCTIONAL_ASSESSMENT: 0-10

## 2018-12-07 ASSESSMENT — LIFESTYLE VARIABLES: SMOKING_STATUS: 0

## 2018-12-08 VITALS
BODY MASS INDEX: 29.44 KG/M2 | WEIGHT: 160 LBS | OXYGEN SATURATION: 97 % | HEART RATE: 78 BPM | TEMPERATURE: 98.4 F | RESPIRATION RATE: 16 BRPM | SYSTOLIC BLOOD PRESSURE: 116 MMHG | DIASTOLIC BLOOD PRESSURE: 65 MMHG | HEIGHT: 62 IN

## 2018-12-08 LAB
ANION GAP SERPL CALCULATED.3IONS-SCNC: 9 MMOL/L (ref 3–16)
BASOPHILS ABSOLUTE: 0 K/UL (ref 0–0.2)
BASOPHILS RELATIVE PERCENT: 0.2 %
BUN BLDV-MCNC: 18 MG/DL (ref 7–20)
CALCIUM SERPL-MCNC: 8.2 MG/DL (ref 8.3–10.6)
CHLORIDE BLD-SCNC: 98 MMOL/L (ref 99–110)
CO2: 27 MMOL/L (ref 21–32)
CREAT SERPL-MCNC: 0.8 MG/DL (ref 0.6–1.2)
EOSINOPHILS ABSOLUTE: 0 K/UL (ref 0–0.6)
EOSINOPHILS RELATIVE PERCENT: 0 %
GFR AFRICAN AMERICAN: >60
GFR NON-AFRICAN AMERICAN: >60
GLUCOSE BLD-MCNC: 145 MG/DL (ref 70–99)
HCT VFR BLD CALC: 26.1 % (ref 36–48)
HEMOGLOBIN: 8.8 G/DL (ref 12–16)
LYMPHOCYTES ABSOLUTE: 0.7 K/UL (ref 1–5.1)
LYMPHOCYTES RELATIVE PERCENT: 11.8 %
MCH RBC QN AUTO: 28.5 PG (ref 26–34)
MCHC RBC AUTO-ENTMCNC: 33.5 G/DL (ref 31–36)
MCV RBC AUTO: 85 FL (ref 80–100)
MONOCYTES ABSOLUTE: 0.5 K/UL (ref 0–1.3)
MONOCYTES RELATIVE PERCENT: 7.6 %
NEUTROPHILS ABSOLUTE: 5.1 K/UL (ref 1.7–7.7)
NEUTROPHILS RELATIVE PERCENT: 80.4 %
PDW BLD-RTO: 21 % (ref 12.4–15.4)
PLATELET # BLD: 239 K/UL (ref 135–450)
PMV BLD AUTO: 6.8 FL (ref 5–10.5)
POTASSIUM SERPL-SCNC: 4.4 MMOL/L (ref 3.5–5.1)
RBC # BLD: 3.07 M/UL (ref 4–5.2)
SODIUM BLD-SCNC: 134 MMOL/L (ref 136–145)
WBC # BLD: 6.3 K/UL (ref 4–11)

## 2018-12-08 PROCEDURE — 80048 BASIC METABOLIC PNL TOTAL CA: CPT

## 2018-12-08 PROCEDURE — 85025 COMPLETE CBC W/AUTO DIFF WBC: CPT

## 2018-12-08 PROCEDURE — 99024 POSTOP FOLLOW-UP VISIT: CPT | Performed by: SURGERY

## 2018-12-08 PROCEDURE — 6370000000 HC RX 637 (ALT 250 FOR IP): Performed by: SURGERY

## 2018-12-08 PROCEDURE — 36415 COLL VENOUS BLD VENIPUNCTURE: CPT

## 2018-12-08 RX ADMIN — HYDROCHLOROTHIAZIDE 12.5 MG: 25 TABLET ORAL at 08:36

## 2018-12-08 RX ADMIN — PANTOPRAZOLE SODIUM 40 MG: 40 TABLET, DELAYED RELEASE ORAL at 06:08

## 2018-12-08 RX ADMIN — SERTRALINE HYDROCHLORIDE 50 MG: 50 TABLET ORAL at 08:36

## 2018-12-08 RX ADMIN — POTASSIUM CHLORIDE 20 MEQ: 20 TABLET, EXTENDED RELEASE ORAL at 08:36

## 2018-12-08 RX ADMIN — LOSARTAN POTASSIUM 50 MG: 25 TABLET ORAL at 08:36

## 2018-12-08 RX ADMIN — OXYCODONE HYDROCHLORIDE 5 MG: 5 TABLET ORAL at 07:34

## 2018-12-08 ASSESSMENT — PAIN DESCRIPTION - ONSET: ONSET: GRADUAL

## 2018-12-08 ASSESSMENT — PAIN SCALES - GENERAL
PAINLEVEL_OUTOF10: 3
PAINLEVEL_OUTOF10: 0

## 2018-12-08 ASSESSMENT — PAIN DESCRIPTION - DESCRIPTORS: DESCRIPTORS: ACHING

## 2018-12-08 ASSESSMENT — PAIN DESCRIPTION - PAIN TYPE: TYPE: ACUTE PAIN

## 2018-12-08 ASSESSMENT — PAIN DESCRIPTION - FREQUENCY: FREQUENCY: INTERMITTENT

## 2018-12-08 ASSESSMENT — PAIN DESCRIPTION - LOCATION: LOCATION: CHEST

## 2018-12-08 ASSESSMENT — PAIN DESCRIPTION - ORIENTATION: ORIENTATION: RIGHT

## 2018-12-10 ENCOUNTER — TELEPHONE (OUTPATIENT)
Dept: FAMILY MEDICINE CLINIC | Age: 73
End: 2018-12-10

## 2018-12-10 LAB
EKG ATRIAL RATE: 83 BPM
EKG DIAGNOSIS: NORMAL
EKG P AXIS: 49 DEGREES
EKG P-R INTERVAL: 172 MS
EKG Q-T INTERVAL: 410 MS
EKG QRS DURATION: 82 MS
EKG QTC CALCULATION (BAZETT): 481 MS
EKG R AXIS: 45 DEGREES
EKG T AXIS: 44 DEGREES
EKG VENTRICULAR RATE: 83 BPM

## 2018-12-18 ENCOUNTER — OFFICE VISIT (OUTPATIENT)
Dept: SURGERY | Age: 73
End: 2018-12-18

## 2018-12-18 VITALS
WEIGHT: 165 LBS | HEIGHT: 62 IN | DIASTOLIC BLOOD PRESSURE: 70 MMHG | SYSTOLIC BLOOD PRESSURE: 124 MMHG | BODY MASS INDEX: 30.36 KG/M2

## 2018-12-18 DIAGNOSIS — Z98.890 POST-OPERATIVE STATE: Primary | ICD-10-CM

## 2018-12-18 PROCEDURE — 99024 POSTOP FOLLOW-UP VISIT: CPT | Performed by: SURGERY

## 2018-12-19 NOTE — PROGRESS NOTES
[facility] GENERAL SURGERY      S:   Patient presents s/p right MRM. She reports doing well with minimal drainage from the drains. O:   Comfortable         Incision site healing well. Drains removed          Path:T3N3 invasive ductal carcinoma with know mets      A:   S/P right MRM for local control of breast cancer    P:   Follow up 2 weeks.  Follow-up with oncology

## 2019-01-03 ENCOUNTER — OFFICE VISIT (OUTPATIENT)
Dept: SURGERY | Age: 74
End: 2019-01-03

## 2019-01-03 VITALS
HEIGHT: 62 IN | DIASTOLIC BLOOD PRESSURE: 74 MMHG | BODY MASS INDEX: 32.2 KG/M2 | SYSTOLIC BLOOD PRESSURE: 126 MMHG | WEIGHT: 175 LBS

## 2019-01-03 DIAGNOSIS — Z98.890 POST-OPERATIVE STATE: Primary | ICD-10-CM

## 2019-01-03 PROCEDURE — 99024 POSTOP FOLLOW-UP VISIT: CPT | Performed by: SURGERY

## 2019-02-13 ENCOUNTER — HOSPITAL ENCOUNTER (OUTPATIENT)
Dept: CT IMAGING | Age: 74
Discharge: HOME | End: 2019-02-13
Payer: MEDICARE

## 2019-02-13 ENCOUNTER — HOSPITAL ENCOUNTER (OUTPATIENT)
Age: 74
Discharge: HOME | End: 2019-02-13
Payer: MEDICARE

## 2019-02-13 DIAGNOSIS — C50.411 MALIGNANT NEOPLASM OF UPPER-OUTER QUADRANT OF RIGHT FEMALE BREAST, UNSPECIFIED ESTROGEN RECEPTOR STATUS (HCC): ICD-10-CM

## 2019-02-13 LAB
BUN BLDV-MCNC: 19 MG/DL (ref 7–20)
CREAT SERPL-MCNC: 0.8 MG/DL (ref 0.6–1.2)
GFR AFRICAN AMERICAN: >60
GFR NON-AFRICAN AMERICAN: >60

## 2019-02-13 PROCEDURE — 84520 ASSAY OF UREA NITROGEN: CPT

## 2019-02-13 PROCEDURE — 6360000004 HC RX CONTRAST MEDICATION: Performed by: NURSE PRACTITIONER

## 2019-02-13 PROCEDURE — 36415 COLL VENOUS BLD VENIPUNCTURE: CPT

## 2019-02-13 PROCEDURE — 74177 CT ABD & PELVIS W/CONTRAST: CPT

## 2019-02-13 PROCEDURE — 82565 ASSAY OF CREATININE: CPT

## 2019-02-13 RX ADMIN — IOHEXOL 50 ML: 240 INJECTION, SOLUTION INTRATHECAL; INTRAVASCULAR; INTRAVENOUS; ORAL at 14:30

## 2019-02-13 RX ADMIN — IOPAMIDOL 75 ML: 755 INJECTION, SOLUTION INTRAVENOUS at 14:31

## 2019-02-22 DIAGNOSIS — F41.1 GENERALIZED ANXIETY DISORDER: ICD-10-CM

## 2019-03-08 ENCOUNTER — OFFICE VISIT (OUTPATIENT)
Dept: FAMILY MEDICINE CLINIC | Age: 74
End: 2019-03-08
Payer: MEDICARE

## 2019-03-08 VITALS
TEMPERATURE: 98.3 F | OXYGEN SATURATION: 91 % | WEIGHT: 179 LBS | DIASTOLIC BLOOD PRESSURE: 73 MMHG | BODY MASS INDEX: 32.73 KG/M2 | SYSTOLIC BLOOD PRESSURE: 118 MMHG | HEART RATE: 89 BPM

## 2019-03-08 DIAGNOSIS — M51.36 DDD (DEGENERATIVE DISC DISEASE), LUMBAR: ICD-10-CM

## 2019-03-08 DIAGNOSIS — I10 ESSENTIAL HYPERTENSION: Primary | ICD-10-CM

## 2019-03-08 DIAGNOSIS — Z17.1 MALIGNANT NEOPLASM OF UPPER-OUTER QUADRANT OF RIGHT BREAST IN FEMALE, ESTROGEN RECEPTOR NEGATIVE (HCC): ICD-10-CM

## 2019-03-08 DIAGNOSIS — C50.411 MALIGNANT NEOPLASM OF UPPER-OUTER QUADRANT OF RIGHT BREAST IN FEMALE, ESTROGEN RECEPTOR NEGATIVE (HCC): ICD-10-CM

## 2019-03-08 PROCEDURE — 1101F PT FALLS ASSESS-DOCD LE1/YR: CPT | Performed by: FAMILY MEDICINE

## 2019-03-08 PROCEDURE — G8484 FLU IMMUNIZE NO ADMIN: HCPCS | Performed by: FAMILY MEDICINE

## 2019-03-08 PROCEDURE — G8427 DOCREV CUR MEDS BY ELIG CLIN: HCPCS | Performed by: FAMILY MEDICINE

## 2019-03-08 PROCEDURE — 1036F TOBACCO NON-USER: CPT | Performed by: FAMILY MEDICINE

## 2019-03-08 PROCEDURE — G8417 CALC BMI ABV UP PARAM F/U: HCPCS | Performed by: FAMILY MEDICINE

## 2019-03-08 PROCEDURE — 4040F PNEUMOC VAC/ADMIN/RCVD: CPT | Performed by: FAMILY MEDICINE

## 2019-03-08 PROCEDURE — 1123F ACP DISCUSS/DSCN MKR DOCD: CPT | Performed by: FAMILY MEDICINE

## 2019-03-08 PROCEDURE — 3017F COLORECTAL CA SCREEN DOC REV: CPT | Performed by: FAMILY MEDICINE

## 2019-03-08 PROCEDURE — 1090F PRES/ABSN URINE INCON ASSESS: CPT | Performed by: FAMILY MEDICINE

## 2019-03-08 PROCEDURE — 99214 OFFICE O/P EST MOD 30 MIN: CPT | Performed by: FAMILY MEDICINE

## 2019-03-08 PROCEDURE — G8399 PT W/DXA RESULTS DOCUMENT: HCPCS | Performed by: FAMILY MEDICINE

## 2019-03-08 ASSESSMENT — PATIENT HEALTH QUESTIONNAIRE - PHQ9
SUM OF ALL RESPONSES TO PHQ QUESTIONS 1-9: 1
SUM OF ALL RESPONSES TO PHQ9 QUESTIONS 1 & 2: 1
1. LITTLE INTEREST OR PLEASURE IN DOING THINGS: 0
2. FEELING DOWN, DEPRESSED OR HOPELESS: 1
SUM OF ALL RESPONSES TO PHQ QUESTIONS 1-9: 1

## 2019-03-15 ENCOUNTER — TELEPHONE (OUTPATIENT)
Dept: FAMILY MEDICINE CLINIC | Age: 74
End: 2019-03-15

## 2019-03-18 ENCOUNTER — TELEPHONE (OUTPATIENT)
Dept: SURGERY | Age: 74
End: 2019-03-18

## 2019-03-28 NOTE — PROGRESS NOTES
1.  Do not eat or drink anything after 12 midnight prior to surgery. This includes no water, chewing gum or mints. 2.  Take the following pills with a small sip of water on the morning of surgery   3. Aspirin, Ibuprofen, Advil, Naproxen, Vitamin E and other Anti-inflammatory products should be stopped for 5 days before surgery or as directed by your physician. 4.  Check with your doctor regarding stopping Plavix, Coumadin, Lovenox, Fragmin or other blood thinners. 5.  Do not smoke and do not drink alcoholic beverages 24 hours prior to surgery. This includes NA Beer. 6.  You may brush your teeth and gargle the morning of surgery. DO NOT SWALLOW WATER.  7.  You MUST make arrangements for a responsible adult to take you home after your surgery. You will not be allowed to leave alone or drive yourself home. It is strongly suggested someone stay with you the first 24 hours. Your surgery will be cancelled if you do not have a ride home. 8.  A parent/legal guardian must accompany a child scheduled for surgery and plan to stay at the hospital until the child is discharged. Please do not bring other children with you. 9.  Please wear simple, loose fitting clothing to the hospital.  Earlis Alu not bring valuables ( money, credit cards, checkbooks, etc.)  Do not wear any makeup (including no eye makeup) or nail polish on your fingers or toes. 10.  Do not wear any jewelry or piercing on the day of surgery. All body piercing jewelry must be removed. 11.  If you have dentures, they will be removed before going to the OR; we will provide you a container. If you wear contact lenses or glasses, they will be removed; please bring a case for them. 12.  Please see your family doctor/pediatrician for a history & physical and/or concerning medications. Bring any test results/reports from your physician's office the day of surgery. 15.  Remember to bring Blood Bank Bracelet to the hospital on the day of surgery.   14.  If you have a Living Will and Durable Power of  for Healthcare, please bring in a copy. 13.  Notify your Surgeon if you develop any illness between now and surgery time; cough, cold, fever, sore throat, nausea, vomiting, etc.  Please notify your surgeon if you experience dizziness, shortness of breath or blurred vision between now and the time of your surgery. 16.  DO NOT shave your operative site 96 hours (4 days) prior to surgery. For face and neck surgery, men may use an electric razor 48 hours (2 days) prior to surgery. 17. Shower the night before surgery and the morning of surgery with  an antibacterial soap   or  Chlorhexidine gluconate (for total joint replacement). To provide excellent care, visitors will be limited to two in a room at any given time. Please no children under the age of 15 in the surgical department.

## 2019-04-01 ENCOUNTER — ANESTHESIA EVENT (OUTPATIENT)
Dept: OPERATING ROOM | Age: 74
End: 2019-04-01
Payer: MEDICARE

## 2019-04-02 ENCOUNTER — APPOINTMENT (OUTPATIENT)
Dept: GENERAL RADIOLOGY | Age: 74
End: 2019-04-02
Attending: SURGERY
Payer: MEDICARE

## 2019-04-02 ENCOUNTER — ANESTHESIA (OUTPATIENT)
Dept: OPERATING ROOM | Age: 74
End: 2019-04-02
Payer: MEDICARE

## 2019-04-02 ENCOUNTER — HOSPITAL ENCOUNTER (OUTPATIENT)
Age: 74
Setting detail: OUTPATIENT SURGERY
Discharge: HOME | End: 2019-04-02
Attending: SURGERY | Admitting: SURGERY
Payer: MEDICARE

## 2019-04-02 VITALS
DIASTOLIC BLOOD PRESSURE: 64 MMHG | OXYGEN SATURATION: 95 % | SYSTOLIC BLOOD PRESSURE: 99 MMHG | RESPIRATION RATE: 14 BRPM

## 2019-04-02 VITALS
OXYGEN SATURATION: 96 % | RESPIRATION RATE: 16 BRPM | SYSTOLIC BLOOD PRESSURE: 133 MMHG | HEART RATE: 77 BPM | BODY MASS INDEX: 30.12 KG/M2 | HEIGHT: 63 IN | DIASTOLIC BLOOD PRESSURE: 77 MMHG | TEMPERATURE: 97.6 F | WEIGHT: 170 LBS

## 2019-04-02 PROCEDURE — 3600000012 HC SURGERY LEVEL 2 ADDTL 15MIN: Performed by: SURGERY

## 2019-04-02 PROCEDURE — 7100000011 HC PHASE II RECOVERY - ADDTL 15 MIN: Performed by: SURGERY

## 2019-04-02 PROCEDURE — 2500000003 HC RX 250 WO HCPCS: Performed by: NURSE ANESTHETIST, CERTIFIED REGISTERED

## 2019-04-02 PROCEDURE — 6360000002 HC RX W HCPCS: Performed by: NURSE ANESTHETIST, CERTIFIED REGISTERED

## 2019-04-02 PROCEDURE — 77001 FLUOROGUIDE FOR VEIN DEVICE: CPT | Performed by: SURGERY

## 2019-04-02 PROCEDURE — 77001 FLUOROGUIDE FOR VEIN DEVICE: CPT

## 2019-04-02 PROCEDURE — 7100000010 HC PHASE II RECOVERY - FIRST 15 MIN: Performed by: SURGERY

## 2019-04-02 PROCEDURE — 2580000003 HC RX 258: Performed by: ANESTHESIOLOGY

## 2019-04-02 PROCEDURE — 6360000002 HC RX W HCPCS: Performed by: SURGERY

## 2019-04-02 PROCEDURE — 36561 INSERT TUNNELED CV CATH: CPT | Performed by: SURGERY

## 2019-04-02 PROCEDURE — 2709999900 HC NON-CHARGEABLE SUPPLY: Performed by: SURGERY

## 2019-04-02 PROCEDURE — C1788 PORT, INDWELLING, IMP: HCPCS | Performed by: SURGERY

## 2019-04-02 PROCEDURE — 71045 X-RAY EXAM CHEST 1 VIEW: CPT

## 2019-04-02 PROCEDURE — 3600000002 HC SURGERY LEVEL 2 BASE: Performed by: SURGERY

## 2019-04-02 PROCEDURE — 2500000003 HC RX 250 WO HCPCS: Performed by: SURGERY

## 2019-04-02 PROCEDURE — 3700000001 HC ADD 15 MINUTES (ANESTHESIA): Performed by: SURGERY

## 2019-04-02 PROCEDURE — 3700000000 HC ANESTHESIA ATTENDED CARE: Performed by: SURGERY

## 2019-04-02 PROCEDURE — 2580000003 HC RX 258: Performed by: SURGERY

## 2019-04-02 DEVICE — PORT INFUS 8FR PWR INJ CT FOR VASC ACCS CATH: Type: IMPLANTABLE DEVICE | Site: CHEST | Status: FUNCTIONAL

## 2019-04-02 RX ORDER — MIDAZOLAM HYDROCHLORIDE 1 MG/ML
INJECTION INTRAMUSCULAR; INTRAVENOUS PRN
Status: DISCONTINUED | OUTPATIENT
Start: 2019-04-02 | End: 2019-04-02 | Stop reason: SDUPTHER

## 2019-04-02 RX ORDER — FENTANYL CITRATE 50 UG/ML
INJECTION, SOLUTION INTRAMUSCULAR; INTRAVENOUS PRN
Status: DISCONTINUED | OUTPATIENT
Start: 2019-04-02 | End: 2019-04-02 | Stop reason: SDUPTHER

## 2019-04-02 RX ORDER — HEPARIN SODIUM (PORCINE) LOCK FLUSH IV SOLN 100 UNIT/ML 100 UNIT/ML
SOLUTION INTRAVENOUS PRN
Status: DISCONTINUED | OUTPATIENT
Start: 2019-04-02 | End: 2019-04-02 | Stop reason: ALTCHOICE

## 2019-04-02 RX ORDER — VANCOMYCIN HYDROCHLORIDE 1 G/20ML
INJECTION, POWDER, LYOPHILIZED, FOR SOLUTION INTRAVENOUS
Status: DISCONTINUED
Start: 2019-04-02 | End: 2019-04-02 | Stop reason: HOSPADM

## 2019-04-02 RX ORDER — SODIUM CHLORIDE, SODIUM LACTATE, POTASSIUM CHLORIDE, CALCIUM CHLORIDE 600; 310; 30; 20 MG/100ML; MG/100ML; MG/100ML; MG/100ML
INJECTION, SOLUTION INTRAVENOUS CONTINUOUS
Status: DISCONTINUED | OUTPATIENT
Start: 2019-04-02 | End: 2019-04-02 | Stop reason: HOSPADM

## 2019-04-02 RX ORDER — PROPOFOL 10 MG/ML
INJECTION, EMULSION INTRAVENOUS PRN
Status: DISCONTINUED | OUTPATIENT
Start: 2019-04-02 | End: 2019-04-02 | Stop reason: SDUPTHER

## 2019-04-02 RX ORDER — LIDOCAINE HYDROCHLORIDE 20 MG/ML
INJECTION, SOLUTION INFILTRATION; PERINEURAL PRN
Status: DISCONTINUED | OUTPATIENT
Start: 2019-04-02 | End: 2019-04-02 | Stop reason: SDUPTHER

## 2019-04-02 RX ADMIN — VANCOMYCIN HYDROCHLORIDE 1000 MG: 1 INJECTION, POWDER, LYOPHILIZED, FOR SOLUTION INTRAVENOUS at 12:07

## 2019-04-02 RX ADMIN — SODIUM CHLORIDE, POTASSIUM CHLORIDE, SODIUM LACTATE AND CALCIUM CHLORIDE: 600; 310; 30; 20 INJECTION, SOLUTION INTRAVENOUS at 12:10

## 2019-04-02 RX ADMIN — SODIUM CHLORIDE, POTASSIUM CHLORIDE, SODIUM LACTATE AND CALCIUM CHLORIDE: 600; 310; 30; 20 INJECTION, SOLUTION INTRAVENOUS at 11:27

## 2019-04-02 RX ADMIN — MIDAZOLAM 1 MG: 1 INJECTION INTRAMUSCULAR; INTRAVENOUS at 12:13

## 2019-04-02 RX ADMIN — LIDOCAINE HYDROCHLORIDE 60 MG: 20 INJECTION, SOLUTION INFILTRATION; PERINEURAL at 12:17

## 2019-04-02 RX ADMIN — MIDAZOLAM 1 MG: 1 INJECTION INTRAMUSCULAR; INTRAVENOUS at 12:10

## 2019-04-02 RX ADMIN — PROPOFOL 300 MG: 10 INJECTION, EMULSION INTRAVENOUS at 12:17

## 2019-04-02 RX ADMIN — FENTANYL CITRATE 50 MCG: 50 INJECTION INTRAMUSCULAR; INTRAVENOUS at 12:10

## 2019-04-02 ASSESSMENT — PULMONARY FUNCTION TESTS
PIF_VALUE: 1
PIF_VALUE: 2
PIF_VALUE: 1

## 2019-04-02 ASSESSMENT — COPD QUESTIONNAIRES: CAT_SEVERITY: MILD

## 2019-04-02 ASSESSMENT — PAIN SCALES - GENERAL
PAINLEVEL_OUTOF10: 0
PAINLEVEL_OUTOF10: 0

## 2019-04-02 ASSESSMENT — PAIN - FUNCTIONAL ASSESSMENT: PAIN_FUNCTIONAL_ASSESSMENT: 0-10

## 2019-04-02 NOTE — H&P
Department of General Surgery - Adult   History and Physical      PATIENT NAME: Khoa Wayne OF BIRTH: 1945    ADMISSION DATE: 4/2/2019  9:51 AM      TODAY'S DATE: 4/2/2019    CHIEF COMPLAINT:  Breast cancer      HISTORY OF PRESENT ILLNESS:  The patient is a 68 y.o. female  who presents with recent diagnosis of breast cancer.  Needs port    Past Medical History:        Diagnosis Date    Abnormal CT scan, chest     Anemia 1/22/2015    Asthma     Axillary adenopathy     Breast cancer metastasized to bone [facility]) 2013    Stage IV adenocarcinoma    Breast mass 2013    Most likely metastatic right breast cancer    COPD (chronic obstructive pulmonary disease) (HCC)     DDD (degenerative disc disease), lumbar     GERD (gastroesophageal reflux disease)     H/O: hysterectomy     1984    Hemorrhoid     Removal in 1989    Hepatitis A 2011    Hilar adenopathy     Hot flashes     Hx of blood clots     1982    Hyperlipidemia     Hypertension     Osteoarthritis     knees    Osteopenia 2012    PE (pulmonary embolism) 1982    on BCP    Pneumonia     Pulmonary nodule        Past Surgical History:        Procedure Laterality Date    APPENDECTOMY      BREAST SURGERY      CARPAL TUNNEL RELEASE  1993    B/L hands    CHOLECYSTECTOMY  1986    COLONOSCOPY  2004    ENDOSCOPY, COLON, DIAGNOSTIC      EYE SURGERY Right 10/11/2018    PHACO EMULSIFICATION OF CATARACT WITH  INTRAOCULAR LENS IMPLANT RIGHT EYE     HEMORRHOID SURGERY      HYSTERECTOMY  1984    MASTECTOMY Right 12/7/2018    BREAST MASTECTOMY performed by Ebony Husain MD at [facility] Left 9/13/2018    PHACO EMULSIFICATION OF CATARACT WITH INTRAOCULAR LENS IMPLANT LEFT EYE performed by Marta Dover MD at [address] [place] [address] Right 10/11/2018    PHACO EMULSIFICATION OF CATARACT WITH  INTRAOCULAR LENS IMPLANT RIGHT EYE performed by Marta Dover MD at [facility] OR       Medications Prior to Admission:   Prior to Admission medications    Medication Sig Start Date End Date Taking? Authorizing Provider   sertraline (ZOLOFT) 50 MG tablet TAKE 1 TABLET BY MOUTH DAILY 2/22/19  Yes Nhan Alcantara MD   fentaNYL (DURAGESIC) 100 MCG/HR Place 1 patch onto the skin every 72 hours. .   Yes Historical Provider, MD   omeprazole (PRILOSEC) 20 MG delayed release capsule TAKE ONE CAPSULE BY MOUTH EVERY DAY 11/26/18  Yes Nhan Alcantara MD   losartan-hydrochlorothiazide [facility]) 50-12.5 MG per tablet TAKE 1 TABLET BY MOUTH EVERY DAY 7/13/18  Yes Nhan Alcantara MD   oxyCODONE-acetaminophen (PERCOCET) 5-325 MG per tablet Take 1-2 tablets by mouth every 4 hours as needed for pain. 8/24/17  Yes Candance Jay, APRN - CNP   potassium chloride (KLOR-CON M) 20 MEQ extended release tablet Take 1 tablet by mouth 2 times daily 6/29/17  Yes Rik Collazo MD   cyclobenzaprine (FLEXERIL) 10 MG tablet Take 1 tablet by mouth 3 times daily as needed for Muscle spasms 6/1/17  Yes Kurt Cabrera MD   XARELTO 20 MG TABS tablet TAKE 1 TABLET BY MOUTH DAILY WITH BREAKFAST 11/30/18   Nhan Alcantara MD   meclizine (ANTIVERT) 25 MG tablet Take 1 tablet by mouth 3 times daily as needed 2/2/16   Nhan Alcantara MD   Spacer/Aero-Holding Chambers (POCKET SPACER) RYAN by Does not apply route. Use with inhaler 8/14/13   Saroj Goode MD       Allergies:  Codeine    Social History:   TOBACCO:   reports that she quit smoking about 20 years ago. Her smoking use included cigarettes. She has a 0.10 pack-year smoking history. She has never used smokeless tobacco.  ETOH:   reports that she does not drink alcohol. DRUGS:   reports that she does not use drugs.     Family History:       Problem Relation Age of Onset    Arthritis Mother     Asthma Mother     Stroke Mother     Early Death Father     Arthritis Sister     Cancer Sister     Cancer Brother        REVIEW OF SYSTEMS:    CONSTITUTIONAL: negative  HEENT:  Negative  RESPIRATORY:  negative  CARDIOVASCULAR:  negative  GASTROINTESTINAL:  negative  GENITOURINARY:  negative  HEMATOLOGIC/LYMPHATIC:  negative  ENDOCRINE:  Negative  NEUROLOGICAL:  Negative  * All other ROS reviewed and negative. PHYSICAL EXAM:    VITALS:  /73   Pulse 84   Temp 97.4 °F (36.3 °C) (Temporal)   Resp 16   Ht 5' 3\" (1.6 m)   Wt 170 lb (77.1 kg)   SpO2 93%   BMI 30.11 kg/m²   INTAKE/OUTPUT:   No intake/output data recorded. No intake/output data recorded. CONSTITUTIONAL:  awake, alert, no apparent distress and mildly obese  ENT:  normocepalic, without obvious abnormality  NECK:  supple, symmetrical, trachea midline   LUNGS:  clear to auscultation  CARDIOVASCULAR:  regular rate and rhythm and no murmur noted  MUSCULOSKELETAL:  1+ pitting edema lower extremities  NEUROLOGIC:  Mental Status Exam:  Level of Alertness:   awake  Orientation:   person, place, time  SKIN:  no bruising or bleeding, normal skin color, texture, turgor and no redness, warmth, or swelling      DATA:  CBC: No results for input(s): WBC, HGB, HCT, PLT in the last 72 hours. BMP:  No results for input(s): NA, K, CL, CO2, BUN, CREATININE, GLUCOSE in the last 72 hours. Hepatic: No results for input(s): AST, ALT, ALB, BILITOT, ALKPHOS in the last 72 hours. Mag:    No results for input(s): MG in the last 72 hours. Phos:   No results for input(s): PHOS in the last 72 hours. INR: No results for input(s): INR in the last 72 hours. ASSESSMENT AND PLAN:  Breast cancer and difficult intravenous access. Plan for port. I explained the procedure including risks, benefits, and alternatives. Questions were answered and the patient agrees to proceed.             Electronically signed by Brian Ramos MD     [address] [id number]

## 2019-04-02 NOTE — ANESTHESIA PRE PROCEDURE
Department of Anesthesiology  Preprocedure Note       Name:  Noe Figueroa   Age:  68 y.o.  :  1945                                          MRN:  [id number]         Date:  2019      Surgeon: Peyton Gillespie):  Kaylynn Barajas MD    Procedure: PORT INSERTION (N/A )    Medications prior to admission:   Prior to Admission medications    Medication Sig Start Date End Date Taking? Authorizing Provider   sertraline (ZOLOFT) 50 MG tablet TAKE 1 TABLET BY MOUTH DAILY 19  Yes Lemuel Szymanski MD   fentaNYL (DURAGESIC) 100 MCG/HR Place 1 patch onto the skin every 72 hours. .   Yes Historical Provider, MD   omeprazole (PRILOSEC) 20 MG delayed release capsule TAKE ONE CAPSULE BY MOUTH EVERY DAY 18  Yes Lemuel Szymanski MD   losartan-hydrochlorothiazide [facility]) 50-12.5 MG per tablet TAKE 1 TABLET BY MOUTH EVERY DAY 18  Yes Lemuel Szymanski MD   oxyCODONE-acetaminophen (PERCOCET) 5-325 MG per tablet Take 1-2 tablets by mouth every 4 hours as needed for pain. 17  Yes MAVIS Young - CNP   potassium chloride (KLOR-CON M) 20 MEQ extended release tablet Take 1 tablet by mouth 2 times daily 17  Yes Thien Collazo MD   cyclobenzaprine (FLEXERIL) 10 MG tablet Take 1 tablet by mouth 3 times daily as needed for Muscle spasms 17  Yes Zack Carbajal MD   XARELTO 20 MG TABS tablet TAKE 1 TABLET BY MOUTH DAILY WITH BREAKFAST 18   Lemuel Szymanski MD   meclizine (ANTIVERT) 25 MG tablet Take 1 tablet by mouth 3 times daily as needed 16   Lemuel Szymanski MD   Spacer/Aero-Holding Chambers (POCKET SPACER) RYAN by Does not apply route.  Use with inhaler 13   Rae Sutton MD       Current medications:    Current Facility-Administered Medications   Medication Dose Route Frequency Provider Last Rate Last Dose    lactated ringers infusion   Intravenous Continuous Julio Benavides MD        lidocaine 1 % (PF) injection 0.1 mL  0.1 mL Intradermal Once PRN Ren Bowers Samuel Ivy MD           Allergies:     Allergies   Allergen Reactions    Codeine Other (See Comments)     Hallucinations       Problem List:    Patient Active Problem List   Diagnosis Code    GERD (gastroesophageal reflux disease) K21.9    Malignant neoplasm of upper-outer quadrant of right breast in female, estrogen receptor negative ([address] C50.411, Z17.1    DDD (degenerative disc disease), lumbar M51.36    Anemia D64.9    Estrogen receptor negative Z17.1    Acute deep vein thrombosis (DVT) of distal end of right lower extremity (HCC) I82.4Z1    Essential hypertension I10    Mixed hyperlipidemia E78.2    Generalized anxiety disorder F41.1    Primary osteoarthritis involving multiple joints M15.0    Secondary malignant neoplasm of bone ([address] C79.51    Breast cancer in female [facility]) C50.919       Past Medical History:        Diagnosis Date    Abnormal CT scan, chest     Anemia 1/22/2015    Asthma     Axillary adenopathy     Breast cancer metastasized to bone ([address] 2013    Stage IV adenocarcinoma    Breast mass 2013    Most likely metastatic right breast cancer    COPD (chronic obstructive pulmonary disease) ([address]     DDD (degenerative disc disease), lumbar     GERD (gastroesophageal reflux disease)     H/O: hysterectomy     1984    Hemorrhoid     Removal in 1989    Hepatitis A 2011    Hilar adenopathy     Hot flashes     Hx of blood clots     1982    Hyperlipidemia     Hypertension     Osteoarthritis     knees    Osteopenia 2012    PE (pulmonary embolism) 1982    on BCP    Pneumonia     Pulmonary nodule        Past Surgical History:        Procedure Laterality Date    APPENDECTOMY      BREAST SURGERY      CARPAL TUNNEL RELEASE  1993    B/L hands    CHOLECYSTECTOMY  1986    COLONOSCOPY  2004    ENDOSCOPY, COLON, DIAGNOSTIC      EYE SURGERY Right 10/11/2018    PHACO EMULSIFICATION OF CATARACT WITH  INTRAOCULAR LENS IMPLANT RIGHT EYE     HEMORRHOID SURGERY      HYSTERECTOMY  1984    MASTECTOMY Right 2018    BREAST MASTECTOMY performed by Mariaz Houston MD at [facility] Left 2018    PHACO EMULSIFICATION OF CATARACT WITH INTRAOCULAR LENS IMPLANT LEFT EYE performed by Geraldine Nazario MD at [facility] Right 10/11/2018    PHACO EMULSIFICATION OF CATARACT WITH  INTRAOCULAR LENS IMPLANT RIGHT EYE performed by Geraldine Nazario MD at [place] [address] History:    Social History     Tobacco Use    Smoking status: Former Smoker     Packs/day: 0.02     Years: 5.00     Pack years: 0.10     Types: Cigarettes     Last attempt to quit: 1999     Years since quittin.2    Smokeless tobacco: Never Used   Substance Use Topics    Alcohol use: No                                Counseling given: Not Answered      Vital Signs (Current):   Vitals:    19 1024   BP: 113/73   Pulse: 84   Resp: 16   Temp: 97.4 °F (36.3 °C)   TempSrc: Temporal   SpO2: 93%   Weight: 170 lb (77.1 kg)   Height: 5' 3\" (1.6 m)                                              BP Readings from Last 3 Encounters:   19 113/73   19 118/73   19 126/74       NPO Status: Time of last liquid consumption: 0000                        Time of last solid consumption: 0000                        Date of last liquid consumption: 19                        Date of last solid food consumption: 19    BMI:   Wt Readings from Last 3 Encounters:   19 170 lb (77.1 kg)   19 179 lb (81.2 kg)   19 175 lb (79.4 kg)     Body mass index is 30.11 kg/m².     CBC:   Lab Results   Component Value Date    WBC 6.3 2018    RBC 3.07 2018    RBC 4.49 2017    HGB 8.8 2018    HCT 26.1 2018    MCV 85.0 2018    RDW 21.0 2018     2018       CMP:   Lab Results   Component Value Date     2018    K 4.4 2018    CL 98 2018    CO2 27 12/08/2018    BUN 19 02/13/2019    CREATININE 0.8 02/13/2019    GFRAA >60 02/13/2019    GFRAA >60 05/10/2013    AGRATIO 1.1 11/21/2017    LABGLOM >60 02/13/2019    GLUCOSE 145 12/08/2018    GLUCOSE 97 08/24/2017    PROT 7.9 11/21/2017    PROT 7.5 08/24/2017    CALCIUM 8.2 12/08/2018    BILITOT 0.7 11/21/2017    ALKPHOS 140 11/21/2017    AST 35 11/21/2017    ALT 23 11/21/2017       POC Tests: No results for input(s): POCGLU, POCNA, POCK, POCCL, POCBUN, POCHEMO, POCHCT in the last 72 hours. Coags: No results found for: PROTIME, INR, APTT    HCG (If Applicable): No results found for: PREGTESTUR, PREGSERUM, HCG, HCGQUANT     ABGs: No results found for: PHART, PO2ART, CWN3IDF, MFN6BFW, BEART, C1PKJECX     Type & Screen (If Applicable):  No results found for: LABABO, [address]    Anesthesia Evaluation  Patient summary reviewed no history of anesthetic complications:   Airway: Mallampati: II  TM distance: >3 FB   Neck ROM: full  Mouth opening: > = 3 FB Dental:    (+) upper dentures      Pulmonary:   (+) pneumonia:  COPD: mild,  asthma:                            Cardiovascular:    (+) hypertension:,                   Neuro/Psych:   Negative Neuro/Psych ROS  (+) psychiatric history:            GI/Hepatic/Renal:   (+) GERD:, hepatitis: A, liver disease:,           Endo/Other: Negative Endo/Other ROS       (-) diabetes mellitus               Abdominal:           Vascular: negative vascular ROS. Anesthesia Plan      MAC     ASA 3       Induction: intravenous. MIPS: Prophylactic antiemetics administered. Anesthetic plan and risks discussed with patient. Plan discussed with CRNA.                   Rodrigo Cook MD   4/2/2019

## 2019-04-02 NOTE — OP NOTE
[address]                 [address] [facility] [address]                                OPERATIVE REPORT    PATIENT NAME: Lexa Stubbs                :        1945  MED REC NO:   [id number]                          ROOM:  ACCOUNT NO:   [de-identified]                           ADMIT DATE: 2019  PROVIDER:     Madelin Parmar MD    DATE OF PROCEDURE:  2019    PREOPERATIVE DIAGNOSIS:  Breast cancer and difficult intravenous access. POSTOPERATIVE DIAGNOSIS:  Breast cancer and difficult intravenous  access. PROCEDURE:  Port-A-Cath placement with surgeon's use of fluoroscopy. SURGEON:  Madelin Parmar MD    ANESTHESIA:  Local with MAC. INDICATIONS:  The patient is a 43-year-old woman who presented with  breast cancer. She needs long-term IV access for chemotherapy. OPERATIVE SUMMARY:  After preoperative evaluation, the patient was  brought in the operating suite and placed in a comfortable supine  position on the operating room table. Monitoring equipment was  attached, and she was given intravenous sedation per Anesthesia. She  was placed in Trendelenburg position, and her neck and shoulders were  sterilely prepped and draped, and anesthetized with local anesthetic in  the left subclavian area. The subclavian vein was easily accessed and  wire was passed. Fluoro showed this to be in good position. A small  skin incision was created around the wire, and a subcutaneous pocket was  created inferior to the incision. The sheath and dilator were passed  over the wire under fluoroscopic guidance, and the wire and dilator were  removed. The catheter was passed through the sheath and the sheath was  peeled away. The tip of the catheter was positioned at the atrial-caval  junction cut at the skin level. It was attached to the port using a  locking clip and the port was placed in the subcutaneous pocket.   It was  secured to the chest wall with interrupted sutures of 3-0 Vicryl. Fluoro showed the entire apparatus to be in good position. There was  excellent blood return and easy flush and it was final flush. The  incision was closed with interrupted subcutaneous sutures of 3-0 Vicryl  and a running subcuticular suture of 4-0 Vicryl. Benzoin, Steri-Strips,  and dry sterile dressings were applied. All sponge, needle, and  instrument counts were correct at the end of the case. The patient  tolerated the procedure well and was taken to the recovery area in  stable condition.         Maryellen Dowell MD    D: 04/02/2019 13:10:02       T: 04/02/2019 14:14:08     [email or web address]  Job#: [id number]     Doc#: [id number]    CC:  MD Gene Good MD

## 2019-04-02 NOTE — BRIEF OP NOTE
Brief Postoperative Note  ______________________________________________________________    Patient: Leon Adams  YOB: 1945  MRN: [id number]  Date of Procedure: 4/2/2019    Pre-Op Diagnosis: BREAST CANCER    Post-Op Diagnosis: Same       Procedure(s):  PORT INSERTION    Anesthesia: Monitor Anesthesia Care    Surgeon(s):  Ramon Pardo MD      Estimated Blood Loss (mL): 50    Complications: None    Specimens:   * No specimens in log *    Implants:  Implant Name Type Inv.  Item Serial No.  Lot No. LRB No. Used   PORT CT POWER INJ 8F POLYURET Port PORT CT POWER INJ 8F POLYURET  [facility] [id number] Left 1         Drains:   Closed/Suction Drain Right Breast Bulb (Active)       Closed/Suction Drain Right Breast Bulb (Active)       Findings: as above    Alia Madrigal MD  Date: 4/2/2019  Time: 12:41 PM

## 2019-04-02 NOTE — ANESTHESIA POSTPROCEDURE EVALUATION
Department of Anesthesiology  Postprocedure Note    Patient: Renny Alegria  MRN: [id number]  YOB: 1945  Date of evaluation: 4/2/2019  Time:  3:05 PM     Procedure Summary     Date:  04/02/19 Room / Location:  [place] [address] / [facility] OR    Anesthesia Start:  1210 Anesthesia Stop:  1250    Procedure:  PORT INSERTION (Left Chest) Diagnosis:  (BREAST CANCER)    Surgeon:  Fernie Barry MD Responsible Provider:  Kandi Hancock MD    Anesthesia Type:  MAC ASA Status:  3          Anesthesia Type: MAC    Heike Phase I: Heike Score: 10    Heike Phase II: Heike Score: 10    Last vitals: Reviewed and per EMR flowsheets.        Anesthesia Post Evaluation    Patient location during evaluation: PACU  Level of consciousness: awake  Airway patency: patent  Complications: no  Cardiovascular status: blood pressure returned to baseline  Respiratory status: acceptable  Hydration status: euvolemic

## 2019-05-16 ENCOUNTER — APPOINTMENT (OUTPATIENT)
Dept: GENERAL RADIOLOGY | Age: 74
End: 2019-05-16
Payer: MEDICARE

## 2019-05-16 ENCOUNTER — APPOINTMENT (OUTPATIENT)
Dept: CT IMAGING | Age: 74
End: 2019-05-16
Payer: MEDICARE

## 2019-05-16 ENCOUNTER — HOSPITAL ENCOUNTER (EMERGENCY)
Age: 74
Discharge: HOME | End: 2019-05-16
Attending: EMERGENCY MEDICINE
Payer: MEDICARE

## 2019-05-16 VITALS
TEMPERATURE: 99.2 F | RESPIRATION RATE: 18 BRPM | WEIGHT: 164 LBS | BODY MASS INDEX: 30.18 KG/M2 | HEIGHT: 62 IN | DIASTOLIC BLOOD PRESSURE: 76 MMHG | SYSTOLIC BLOOD PRESSURE: 127 MMHG | OXYGEN SATURATION: 94 % | HEART RATE: 66 BPM

## 2019-05-16 DIAGNOSIS — R06.02 SHORTNESS OF BREATH: Primary | ICD-10-CM

## 2019-05-16 LAB
A/G RATIO: 1.1 (ref 1.1–2.2)
ALBUMIN SERPL-MCNC: 3.9 G/DL (ref 3.4–5)
ALP BLD-CCNC: 106 U/L (ref 40–129)
ALT SERPL-CCNC: 12 U/L (ref 10–40)
ANION GAP SERPL CALCULATED.3IONS-SCNC: 14 MMOL/L (ref 3–16)
AST SERPL-CCNC: 47 U/L (ref 15–37)
BASOPHILS ABSOLUTE: 0.1 K/UL (ref 0–0.2)
BASOPHILS RELATIVE PERCENT: 1.3 %
BILIRUB SERPL-MCNC: 0.4 MG/DL (ref 0–1)
BUN BLDV-MCNC: 19 MG/DL (ref 7–20)
CALCIUM SERPL-MCNC: 8.9 MG/DL (ref 8.3–10.6)
CHLORIDE BLD-SCNC: 103 MMOL/L (ref 99–110)
CO2: 20 MMOL/L (ref 21–32)
CREAT SERPL-MCNC: 1 MG/DL (ref 0.6–1.2)
EKG ATRIAL RATE: 105 BPM
EKG DIAGNOSIS: NORMAL
EKG P AXIS: 49 DEGREES
EKG P-R INTERVAL: 152 MS
EKG Q-T INTERVAL: 348 MS
EKG QRS DURATION: 78 MS
EKG QTC CALCULATION (BAZETT): 459 MS
EKG R AXIS: 36 DEGREES
EKG T AXIS: 22 DEGREES
EKG VENTRICULAR RATE: 105 BPM
EOSINOPHILS ABSOLUTE: 0.2 K/UL (ref 0–0.6)
EOSINOPHILS RELATIVE PERCENT: 2.3 %
GFR AFRICAN AMERICAN: >60
GFR NON-AFRICAN AMERICAN: 54
GLOBULIN: 3.6 G/DL
GLUCOSE BLD-MCNC: 129 MG/DL (ref 70–99)
HCT VFR BLD CALC: 43.6 % (ref 36–48)
HEMOGLOBIN: 14.5 G/DL (ref 12–16)
LYMPHOCYTES ABSOLUTE: 1.1 K/UL (ref 1–5.1)
LYMPHOCYTES RELATIVE PERCENT: 12.3 %
MCH RBC QN AUTO: 31.4 PG (ref 26–34)
MCHC RBC AUTO-ENTMCNC: 33.4 G/DL (ref 31–36)
MCV RBC AUTO: 94.1 FL (ref 80–100)
MONOCYTES ABSOLUTE: 1.1 K/UL (ref 0–1.3)
MONOCYTES RELATIVE PERCENT: 12.4 %
NEUTROPHILS ABSOLUTE: 6.3 K/UL (ref 1.7–7.7)
NEUTROPHILS RELATIVE PERCENT: 71.7 %
PDW BLD-RTO: 18.3 % (ref 12.4–15.4)
PLATELET # BLD: 520 K/UL (ref 135–450)
PMV BLD AUTO: 7.2 FL (ref 5–10.5)
POTASSIUM SERPL-SCNC: 4.1 MMOL/L (ref 3.5–5.1)
RBC # BLD: 4.63 M/UL (ref 4–5.2)
SODIUM BLD-SCNC: 137 MMOL/L (ref 136–145)
TOTAL PROTEIN: 7.5 G/DL (ref 6.4–8.2)
TROPONIN: 0.04 NG/ML
TROPONIN: 0.04 NG/ML
WBC # BLD: 8.7 K/UL (ref 4–11)

## 2019-05-16 PROCEDURE — 71046 X-RAY EXAM CHEST 2 VIEWS: CPT

## 2019-05-16 PROCEDURE — 84484 ASSAY OF TROPONIN QUANT: CPT

## 2019-05-16 PROCEDURE — 99285 EMERGENCY DEPT VISIT HI MDM: CPT

## 2019-05-16 PROCEDURE — 93005 ELECTROCARDIOGRAM TRACING: CPT | Performed by: EMERGENCY MEDICINE

## 2019-05-16 PROCEDURE — 80053 COMPREHEN METABOLIC PANEL: CPT

## 2019-05-16 PROCEDURE — 85025 COMPLETE CBC W/AUTO DIFF WBC: CPT

## 2019-05-16 PROCEDURE — 71260 CT THORAX DX C+: CPT

## 2019-05-16 PROCEDURE — 93010 ELECTROCARDIOGRAM REPORT: CPT | Performed by: INTERNAL MEDICINE

## 2019-05-16 PROCEDURE — 6360000004 HC RX CONTRAST MEDICATION: Performed by: EMERGENCY MEDICINE

## 2019-05-16 RX ORDER — OMEPRAZOLE 20 MG/1
CAPSULE, DELAYED RELEASE ORAL
Qty: 90 CAPSULE | Refills: 1 | Status: SHIPPED | OUTPATIENT
Start: 2019-05-16 | End: 2019-12-02 | Stop reason: SDUPTHER

## 2019-05-16 RX ADMIN — IOPAMIDOL 85 ML: 755 INJECTION, SOLUTION INTRAVENOUS at 18:16

## 2019-05-16 NOTE — ED NOTES
MD notified pt.  And family w/questions regarding plan of care       Jared Gutierrez RN  05/16/19 1270

## 2019-05-16 NOTE — ED NOTES
Chief Complaint   Patient presents with    Shortness of Breath     started 2 weeks ago with SOB without relief, denies pain, had chemo treatment today, Dr. Rose Mary Sexton obtained an EKG and suggested that pt come to ED for eval     First contact w/pt. For this rn. Pt. States she has noticed increasing sob x2 weeks. States she had chemo this afternoon at [facility] and was told \"something on my ekg didn't look right\". Pt. States she was instructed to come to the ed for evaluation. On exam pt. Is alert and oriented, port site to L Chest intact. Pt. Had piv placed and labs collected and was placed on hr/rr monitoring. Pt. Provided w/and oriented to the call light and is aware of plan of care at this time. Will cont. To monitor.      Yeinfer Kraus RN  05/16/19 1900

## 2019-05-16 NOTE — ED PROVIDER NOTES
Triage Chief Complaint:    Shortness of Breath (started 2 weeks ago with SOB without relief, denies pain, had chemo treatment today, Dr. Trevor Zambrano obtained an EKG and suggested that pt come to ED for eval)    NERI:  Bibi Calles is a 68 y.o. female that presents to urgent department with complaints of having shortness for this going on for last 2 weeks. Patient states that she had not really had any chest pain, but does have a history of having breast cancer. Patient is undergoing chemo at the present time. Patient states that she was at therapy today, and had an EKG done and was told that seemed abnormal she come to the emergency department. The patient the present time is denying any chest pain however does appear to be some tightness in her chest which does radiate. The patient denies fevers or chills. Patient denies nausea or vomiting. Patient states that there is no headache, and she states she does not smoke. Patient has had a DVT/PE in the past.    ROS:  At least 10 systems reviewed and otherwise acutely negative except as in the [address]     Past Medical History:   Diagnosis Date    Abnormal CT scan, chest     Anemia 1/22/2015    Asthma     Axillary adenopathy     Breast cancer metastasized to bone [facility]) 2013    Stage IV adenocarcinoma    Breast mass 2013    Most likely metastatic right breast cancer    COPD (chronic obstructive pulmonary disease) (HCC)     DDD (degenerative disc disease), lumbar     GERD (gastroesophageal reflux disease)     H/O: hysterectomy     1984    Hemorrhoid     Removal in 1989    Hepatitis A 2011    Hilar adenopathy     Hot flashes     Hx of blood clots     1982    Hyperlipidemia     Hypertension     Osteoarthritis     knees    Osteopenia 2012    PE (pulmonary embolism) 1982    on BCP    Pneumonia     Pulmonary nodule      Past Surgical History:   Procedure Laterality Date    APPENDECTOMY      BREAST SURGERY      CARPAL TUNNEL RELEASE  1993    B/L hands    CHOLECYSTECTOMY  1986    COLONOSCOPY      ENDOSCOPY, COLON, DIAGNOSTIC      EYE SURGERY Right 10/11/2018    PHACO EMULSIFICATION OF CATARACT WITH  INTRAOCULAR LENS IMPLANT RIGHT EYE     HEMORRHOID SURGERY      HYSTERECTOMY  1984    INSERTION / REMOVAL / REPLACEMENT VENOUS ACCESS CATHETER Left 2019    PORT INSERTION performed by Juana Delarosa MD at [place] [id number] Right 2018    BREAST MASTECTOMY performed by Juana Delarosa MD at [address] [place] [address] Left 2018    PHACO EMULSIFICATION OF CATARACT WITH INTRAOCULAR LENS IMPLANT LEFT EYE performed by Cathy Jeffrey MD at [address] [place] [address] Right 10/11/2018    PHACO EMULSIFICATION OF CATARACT WITH  INTRAOCULAR LENS IMPLANT RIGHT EYE performed by Cathy Jeffrey MD at [facility] OR     Family History   Problem Relation Age of Onset    Arthritis Mother     Asthma Mother     Stroke Mother     Early Death Father     Arthritis Sister     Cancer Sister     Cancer Brother      Social History     Socioeconomic History    Marital status:      Spouse name: Not on file    Number of children: Not on file    Years of education: Not on file    Highest education level: Not on file   Occupational History    Not on file   Social Needs    Financial resource strain: Not on file    Food insecurity:     Worry: Not on file     Inability: Not on file    Transportation needs:     Medical: Not on file     Non-medical: Not on file   Tobacco Use    Smoking status: Former Smoker     Packs/day: 0.02     Years: 5.00     Pack years: 0.10     Types: Cigarettes     Last attempt to quit: 1999     Years since quittin.3    Smokeless tobacco: Never Used   Substance and Sexual Activity    Alcohol use: No    Drug use: No    Sexual activity: Never   Lifestyle    Physical activity:     Days per week: Not on file     Minutes per session: Not on file    Stress: Not on file Height Weight   107/73 99.2 °F (37.3 °C) Oral 105 16 93 % 5' 2\" (1.575 m) 164 lb (74.4 kg)     GENERAL APPEARANCE: Awake and alert. Cooperative. Mild acute distress. HEAD:Normocephalic. Atraumatic. EYES: EOM's grossly intact. Sclera anicteric. ENT: Mucous membranes are moist. Tolerates saliva. No trismus. NECK: Supple. No meningismus. Trachea midline. HEART: Tachycardia without murmur  LUNGS: Respirations unlabored. CTAB  ABDOMEN: Soft. Non-tender. No guarding or rebound. EXTREMITIES: No acute deformities. SKIN: Warm and dry. NEUROLOGICAL: No gross facial drooping. Moves all 4 extremities spontaneously. PSYCHIATRIC: Normal mood.   Physical Exam    I have reviewed and interpreted all of the currently availablelab results from this visit (if applicable):  Results for orders placed or performed during the hospital encounter of 05/16/19   CBC Auto Differential   Result Value Ref Range    WBC 8.7 4.0 - 11.0 K/uL    RBC 4.63 4.00 - 5.20 M/uL    Hemoglobin 14.5 12.0 - 16.0 g/dL    Hematocrit 43.6 36.0 - 48.0 %    MCV 94.1 80.0 - 100.0 fL    MCH 31.4 26.0 - 34.0 pg    MCHC 33.4 31.0 - 36.0 g/dL    RDW 18.3 (H) 12.4 - 15.4 %    Platelets 529 (H) 118 - 450 K/uL    MPV 7.2 5.0 - 10.5 fL    Neutrophils % 71.7 %    Lymphocytes % 12.3 %    Monocytes % 12.4 %    Eosinophils % 2.3 %    Basophils % 1.3 %    Neutrophils # 6.3 1.7 - 7.7 K/uL    Lymphocytes # 1.1 1.0 - 5.1 K/uL    Monocytes # 1.1 0.0 - 1.3 K/uL    Eosinophils # 0.2 0.0 - 0.6 K/uL    Basophils # 0.1 0.0 - 0.2 K/uL   Comprehensive Metabolic Panel   Result Value Ref Range    Sodium 137 136 - 145 mmol/L    Potassium 4.1 3.5 - 5.1 mmol/L    Chloride 103 99 - 110 mmol/L    CO2 20 (L) 21 - 32 mmol/L    Anion Gap 14 3 - 16    Glucose 129 (H) 70 - 99 mg/dL    BUN 19 7 - 20 mg/dL    CREATININE 1.0 0.6 - 1.2 mg/dL    GFR Non-African American 54 (A) >60    GFR African American >60 >60    Calcium 8.9 8.3 - 10.6 mg/dL    Total Protein 7.5 6.4 - 8.2 g/dL    Alb 3.9 3.4 - 5.0 g/dL    Albumin/Globulin Ratio 1.1 1.1 - 2.2    Total Bilirubin 0.4 0.0 - 1.0 mg/dL    Alkaline Phosphatase 106 40 - 129 U/L    ALT 12 10 - 40 U/L    AST 47 (H) 15 - 37 U/L    Globulin 3.6 g/dL   Troponin   Result Value Ref Range    Troponin 0.04 (H) <0.01 ng/mL   Troponin   Result Value Ref Range    Troponin 0.04 (H) <0.01 ng/mL   EKG 12 Lead   Result Value Ref Range    Ventricular Rate 105 BPM    Atrial Rate 105 BPM    P-R Interval 152 ms    QRS Duration 78 ms    Q-T Interval 348 ms    QTc Calculation (Bazett) 459 ms    P Axis 49 degrees    R Axis 36 degrees    T Axis 22 degrees    Diagnosis       Sinus tachycardia with Premature supraventricular complexes Low voltage QRSOtherwise normal ECGWhen compared with ECG of 07-DEC-2018 09:31,Premature supraventricular complexes are now PresentConfirmed by Angelica Krishna MD, [address] ([facility]) on 5/16/2019 4:51:16 PM        Radiographs (if obtained):  [] The following radiograph was interpreted by myself in the absence of a radiologist:  [x] Radiologist's Report Reviewed:  CT CHEST PULMONARY EMBOLISM W CONTRAST   Final Result   No evidence pulmonary embolism. Overall increased extent of masslike consolidation within the right upper   lobe suspicious for progressing neoplasm and/or acute airspace disease. Otherwise stable findings including multifocal bilateral airspace   opacification including nodular and ground-glass opacity, similar to   02/13/2018. Extensive skeletal disease are also again seen. XR CHEST STANDARD (2 VW)   Final Result   Diffuse bilateral and focal right perihilar airspace disease has distribution   similar to the prior examination. Osteoblastic metastatic disease. EKG (if obtained): (All EKG's are interpreted by myself in theabsence of a cardiologist)  Normal sinus rhythm, normal QRS, no STEMI. Procedures    MDM:  After my initial evaluation, the patient brought by physician area and did have blood work drawn and sent. Patient did have an EKG done EKG did not show any signs of acute abnormalities. The patient has no signs of STEMI. Patient had chest x-ray done which does demonstrate signs of cancer, for which the patient are being treated. Patient, it was decided would need to have a CT scan of her chest notable for possibility of a pulmonary embolus. Patient was mildly tachycardic. Patient is a history of having cancer, so I do feel that PE is in the differential diagnosis. Patient's PE study was negative for PE. Patient's blood work, with the exception of troponin, were all normal.  The patient's troponin did come back at 0.04, I did decide do a second troponin ordered to rule out the possibility of continued elevation of the troponin. Patient's second troponin came back at 0.04 as well, so I do feel that she can be safely discharged home. We discussed all this with the family and with the patient herself, and they do feel comfortable with the plan to discharge. Clinical Impression:  1.  Shortness of breath      (Please note that portions of this note Estrella Batistay been completed with a voice recognition program. Efforts were made to edit the dictations but occasionally words are mis-transcribed.)    MD Miguel Belcher MD  05/16/19 1844

## 2019-05-17 NOTE — ED NOTES
Pt. Is alert and oriented w/family at bedside. piv to L ac appears wnl. Vss. Pt. Ambulated to bathroom independently and is aware of plan of care at this time. Call light w/in reach will cont. To monitor.      Herb Zaragoza RN  05/16/19 [id number]

## 2019-06-06 ENCOUNTER — HOSPITAL ENCOUNTER (OUTPATIENT)
Dept: CT IMAGING | Age: 74
Discharge: HOME | End: 2019-06-06
Payer: MEDICARE

## 2019-06-06 DIAGNOSIS — C79.52 SECONDARY MALIGNANT NEOPLASM OF BONE AND BONE MARROW (HCC): ICD-10-CM

## 2019-06-06 DIAGNOSIS — C50.411 MALIGNANT NEOPLASM OF UPPER-OUTER QUADRANT OF RIGHT FEMALE BREAST, UNSPECIFIED ESTROGEN RECEPTOR STATUS (HCC): ICD-10-CM

## 2019-06-06 DIAGNOSIS — C79.51 SECONDARY MALIGNANT NEOPLASM OF BONE AND BONE MARROW (HCC): ICD-10-CM

## 2019-06-06 PROCEDURE — 6360000004 HC RX CONTRAST MEDICATION: Performed by: INTERNAL MEDICINE

## 2019-06-06 PROCEDURE — 74177 CT ABD & PELVIS W/CONTRAST: CPT

## 2019-06-06 RX ADMIN — IOPAMIDOL 75 ML: 755 INJECTION, SOLUTION INTRAVENOUS at 11:16

## 2019-06-06 RX ADMIN — IOHEXOL 50 ML: 240 INJECTION, SOLUTION INTRATHECAL; INTRAVASCULAR; INTRAVENOUS; ORAL at 11:17

## 2019-06-13 ENCOUNTER — APPOINTMENT (OUTPATIENT)
Dept: GENERAL RADIOLOGY | Age: 74
DRG: 180 | End: 2019-06-13
Payer: MEDICARE

## 2019-06-13 ENCOUNTER — HOSPITAL ENCOUNTER (INPATIENT)
Age: 74
LOS: 1 days | Discharge: HOME | DRG: 180 | End: 2019-06-14
Attending: EMERGENCY MEDICINE | Admitting: INTERNAL MEDICINE
Payer: MEDICARE

## 2019-06-13 DIAGNOSIS — R06.00 DYSPNEA AND RESPIRATORY ABNORMALITIES: Primary | ICD-10-CM

## 2019-06-13 DIAGNOSIS — Z85.3 H/O MALIGNANT NEOPLASM OF BREAST: ICD-10-CM

## 2019-06-13 DIAGNOSIS — R09.02 HYPOXIA: ICD-10-CM

## 2019-06-13 DIAGNOSIS — R06.89 DYSPNEA AND RESPIRATORY ABNORMALITIES: Primary | ICD-10-CM

## 2019-06-13 DIAGNOSIS — I49.9 IRREGULAR HEART BEAT: ICD-10-CM

## 2019-06-13 PROBLEM — J96.01 ACUTE RESPIRATORY FAILURE WITH HYPOXIA (HCC): Status: ACTIVE | Noted: 2019-06-13

## 2019-06-13 LAB
A/G RATIO: 0.8 (ref 1.1–2.2)
ALBUMIN SERPL-MCNC: 2.9 G/DL (ref 3.4–5)
ALP BLD-CCNC: 105 U/L (ref 40–129)
ALT SERPL-CCNC: 20 U/L (ref 10–40)
ANION GAP SERPL CALCULATED.3IONS-SCNC: 14 MMOL/L (ref 3–16)
ANISOCYTOSIS: ABNORMAL
AST SERPL-CCNC: 47 U/L (ref 15–37)
ATYPICAL LYMPHOCYTE RELATIVE PERCENT: 1 % (ref 0–6)
BANDED NEUTROPHILS RELATIVE PERCENT: 4 % (ref 0–7)
BASOPHILS ABSOLUTE: 0 K/UL (ref 0–0.2)
BASOPHILS RELATIVE PERCENT: 0 %
BILIRUB SERPL-MCNC: 0.6 MG/DL (ref 0–1)
BUN BLDV-MCNC: 13 MG/DL (ref 7–20)
CALCIUM SERPL-MCNC: 8.2 MG/DL (ref 8.3–10.6)
CHLORIDE BLD-SCNC: 99 MMOL/L (ref 99–110)
CO2: 21 MMOL/L (ref 21–32)
CREAT SERPL-MCNC: 1 MG/DL (ref 0.6–1.2)
EOSINOPHILS ABSOLUTE: 0 K/UL (ref 0–0.6)
EOSINOPHILS RELATIVE PERCENT: 0 %
GFR AFRICAN AMERICAN: >60
GFR NON-AFRICAN AMERICAN: 54
GLOBULIN: 3.6 G/DL
GLUCOSE BLD-MCNC: 134 MG/DL (ref 70–99)
HCT VFR BLD CALC: 36 % (ref 36–48)
HEMOGLOBIN: 12 G/DL (ref 12–16)
LACTIC ACID: 0.9 MMOL/L (ref 0.4–2)
LYMPHOCYTES ABSOLUTE: 1.1 K/UL (ref 1–5.1)
LYMPHOCYTES RELATIVE PERCENT: 9 %
MACROCYTES: ABNORMAL
MCH RBC QN AUTO: 31.3 PG (ref 26–34)
MCHC RBC AUTO-ENTMCNC: 33.4 G/DL (ref 31–36)
MCV RBC AUTO: 93.8 FL (ref 80–100)
MONOCYTES ABSOLUTE: 0.9 K/UL (ref 0–1.3)
MONOCYTES RELATIVE PERCENT: 8 %
NEUTROPHILS ABSOLUTE: 9.3 K/UL (ref 1.7–7.7)
NEUTROPHILS RELATIVE PERCENT: 78 %
NUCLEATED RED BLOOD CELLS: 1 /100 WBC
PDW BLD-RTO: 18.4 % (ref 12.4–15.4)
PLATELET # BLD: 438 K/UL (ref 135–450)
PLATELET SLIDE REVIEW: ADEQUATE
PMV BLD AUTO: 7.6 FL (ref 5–10.5)
POIKILOCYTES: ABNORMAL
POLYCHROMASIA: ABNORMAL
POTASSIUM SERPL-SCNC: 4.1 MMOL/L (ref 3.5–5.1)
PRO-BNP: 398 PG/ML (ref 0–124)
RBC # BLD: 3.84 M/UL (ref 4–5.2)
SODIUM BLD-SCNC: 134 MMOL/L (ref 136–145)
TOTAL PROTEIN: 6.5 G/DL (ref 6.4–8.2)
TROPONIN: <0.01 NG/ML
WBC # BLD: 11.4 K/UL (ref 4–11)

## 2019-06-13 PROCEDURE — 36415 COLL VENOUS BLD VENIPUNCTURE: CPT

## 2019-06-13 PROCEDURE — 71046 X-RAY EXAM CHEST 2 VIEWS: CPT

## 2019-06-13 PROCEDURE — 80053 COMPREHEN METABOLIC PANEL: CPT

## 2019-06-13 PROCEDURE — 93005 ELECTROCARDIOGRAM TRACING: CPT | Performed by: EMERGENCY MEDICINE

## 2019-06-13 PROCEDURE — 1200000000 HC SEMI PRIVATE

## 2019-06-13 PROCEDURE — 99285 EMERGENCY DEPT VISIT HI MDM: CPT

## 2019-06-13 PROCEDURE — 87040 BLOOD CULTURE FOR BACTERIA: CPT

## 2019-06-13 PROCEDURE — 83880 ASSAY OF NATRIURETIC PEPTIDE: CPT

## 2019-06-13 PROCEDURE — 85025 COMPLETE CBC W/AUTO DIFF WBC: CPT

## 2019-06-13 PROCEDURE — 83605 ASSAY OF LACTIC ACID: CPT

## 2019-06-13 PROCEDURE — 84484 ASSAY OF TROPONIN QUANT: CPT

## 2019-06-13 ASSESSMENT — ENCOUNTER SYMPTOMS
CONSTIPATION: 0
TROUBLE SWALLOWING: 0
SHORTNESS OF BREATH: 1
CHEST TIGHTNESS: 0
DIARRHEA: 0
BLOOD IN STOOL: 0
PHOTOPHOBIA: 0
APNEA: 0
VOMITING: 0
VOICE CHANGE: 0
NAUSEA: 0
ABDOMINAL PAIN: 0
STRIDOR: 0
COLOR CHANGE: 0
BACK PAIN: 0
ABDOMINAL DISTENTION: 0
WHEEZING: 0
EYE DISCHARGE: 0
EYE REDNESS: 0
RHINORRHEA: 0
COUGH: 1
EYE PAIN: 0
SINUS PRESSURE: 0
SORE THROAT: 0
RECTAL PAIN: 0

## 2019-06-13 ASSESSMENT — PAIN DESCRIPTION - PAIN TYPE: TYPE: ACUTE PAIN

## 2019-06-13 ASSESSMENT — PAIN SCALES - GENERAL: PAINLEVEL_OUTOF10: 5

## 2019-06-13 NOTE — ED PROVIDER NOTES
Yunier Vo is a 68year old female who has a history of metastatic breast cancer, on chemotherapy. She presents to the ED with several weeks of dyspnea. She has some \"shadows\" on her lungs on her last PET scan, and these are being observed. Today she was at her oncologist's office and she was noted to be very tachypneic with dyspnea on exertion and difficulty with ambulating even 20 feet. She denies chest pain. Her oxygen saturation was 87% on room, and she was noted to have an irregular heart beat. She has no history of atrial fibrillation. She has been seen for this in the last month, and has had two CT scans to rule out pulmonary embolism and they were both negative. She denies any chest pain. No history of CAD. /75   Pulse 91   Temp 98.9 °F (37.2 °C) (Oral)   Resp 14   Ht 5' 2\" (1.575 m)   Wt 160 lb (72.6 kg)   SpO2 96%   BMI 29.26 kg/m²     I have reviewed the following from the nursing documentation:      Prior to Admission medications    Medication Sig Start Date End Date Taking? Authorizing Provider   omeprazole (PRILOSEC) 20 MG delayed release capsule TAKE ONE CAPSULE BY MOUTH EVERY DAY 5/16/19  Yes Dane Hyatt MD   sertraline (ZOLOFT) 50 MG tablet TAKE 1 TABLET BY MOUTH DAILY 2/22/19  Yes Dane Hyatt MD   fentaNYL (DURAGESIC) 100 MCG/HR Place 1 patch onto the skin every 72 hours. .   Yes Historical Provider, MD   XARELTO 20 MG TABS tablet TAKE 1 TABLET BY MOUTH DAILY WITH BREAKFAST 11/30/18  Yes Dane Hyatt MD   losartan-hydrochlorothiazide [place]) 50-12.5 MG per tablet TAKE 1 TABLET BY MOUTH EVERY DAY 7/13/18  Yes Dane Hyatt MD   oxyCODONE-acetaminophen (PERCOCET) 5-325 MG per tablet Take 1-2 tablets by mouth every 4 hours as needed for pain.  8/24/17  Yes MAVIS Hartmann - CNP   potassium chloride (KLOR-CON M) 20 MEQ extended release tablet Take 1 tablet by mouth 2 times daily 6/29/17  Yes Safia Collazo MD   cyclobenzaprine (FLEXERIL) 10 MG tablet Take 1 tablet by mouth 3 times daily as needed for Muscle spasms 6/1/17  Yes Arnol Metzger MD   meclizine (ANTIVERT) 25 MG tablet Take 1 tablet by mouth 3 times daily as needed 2/2/16  Yes Bebe Dubin, MD   Spacer/Aero-Holding Chambers (POCKET SPACER) RYAN by Does not apply route.  Use with inhaler 8/14/13  Yes Vance Terry MD       Allergies as of 06/13/2019 - Review Complete 06/13/2019   Allergen Reaction Noted    Codeine Other (See Comments) 07/13/2012       Past Medical History:   Diagnosis Date    Abnormal CT scan, chest     Anemia 1/22/2015    Asthma     Axillary adenopathy     Breast cancer metastasized to bone [facility]) 2013    Stage IV adenocarcinoma    Breast mass 2013    Most likely metastatic right breast cancer    COPD (chronic obstructive pulmonary disease) ([facility] [address]     DDD (degenerative disc disease), lumbar     GERD (gastroesophageal reflux disease)     H/O: hysterectomy     1984    Hemorrhoid     Removal in 1989    Hepatitis A 2011    Hilar adenopathy     Hot flashes     Hx of blood clots     1982    Hyperlipidemia     Hypertension     Osteoarthritis     knees    Osteopenia 2012    PE (pulmonary embolism) 1982    on BCP    Pneumonia     Pulmonary nodule         Surgical History:   Past Surgical History:   Procedure Laterality Date    APPENDECTOMY      BREAST SURGERY      CARPAL TUNNEL RELEASE  1993    B/L hands    CHOLECYSTECTOMY  1986    COLONOSCOPY  2004    ENDOSCOPY, COLON, DIAGNOSTIC      EYE SURGERY Right 10/11/2018    PHACO EMULSIFICATION OF CATARACT WITH  INTRAOCULAR LENS IMPLANT RIGHT EYE     HEMORRHOID SURGERY      HYSTERECTOMY  1984    INSERTION / REMOVAL / REPLACEMENT VENOUS ACCESS CATHETER Left 4/2/2019    [facility] 12/7/2018    BREAST MASTECTOMY performed by Mohini Merchant MD at [address] [facility] Left 9/13/2018    PHACO EMULSIFICATION OF CATARACT WITH INTRAOCULAR LENS IMPLANT LEFT EYE [facility] Right 10/11/2018    PHACO EMULSIFICATION OF CATARACT WITH  INTRAOCULAR LENS IMPLANT RIGHT EYE performed by Danielle Ruvalcaba MD at [address] [place] [address] Rd OR        Family History:    Family History   Problem Relation Age of Onset    Arthritis Mother     Asthma Mother     Stroke Mother     Early Death Father     Arthritis Sister     Cancer Sister     Cancer Brother        Social History     Socioeconomic History    Marital status:      Spouse name: Not on file    Number of children: Not on file    Years of education: Not on file    Highest education level: Not on file   Occupational History    Not on file   Social Needs    Financial resource strain: Not on file    Food insecurity:     Worry: Not on file     Inability: Not on file    Transportation needs:     Medical: Not on file     Non-medical: Not on file   Tobacco Use    Smoking status: Former Smoker     Packs/day: 0.02     Years: 5.00     Pack years: 0.10     Types: Cigarettes     Last attempt to quit: 1999     Years since quittin.4    Smokeless tobacco: Never Used   Substance and Sexual Activity    Alcohol use: No    Drug use: No    Sexual activity: Never   Lifestyle    Physical activity:     Days per week: Not on file     Minutes per session: Not on file    Stress: Not on file   Relationships    Social connections:     Talks on phone: Not on file     Gets together: Not on file     Attends Sabianism service: Not on file     Active member of club or organization: Not on file     Attends meetings of clubs or organizations: Not on file     Relationship status: Not on file    Intimate partner violence:     Fear of current or ex partner: Not on file     Emotionally abused: Not on file     Physically abused: Not on file     Forced sexual activity: Not on file   Other Topics Concern    Not on file   Social History Narrative    Not on file         Review of Systems   Constitutional: Positive for fatigue. Negative for activity change, appetite change, chills, diaphoresis, fever and unexpected weight change. HENT: Negative for congestion, ear pain, mouth sores, rhinorrhea, sinus pressure, sore throat, tinnitus, trouble swallowing and voice change. Eyes: Negative for photophobia, pain, discharge, redness and visual disturbance. Respiratory: Positive for cough (dry, nonproductive) and shortness of breath. Negative for apnea, chest tightness, wheezing and stridor. Cardiovascular: Negative for chest pain, palpitations and leg swelling. Gastrointestinal: Negative for abdominal distention, abdominal pain, blood in stool, constipation, diarrhea, nausea, rectal pain and vomiting. Genitourinary: Negative for difficulty urinating, dyspareunia, dysuria, flank pain, frequency, genital sores, menstrual problem, pelvic pain, urgency, vaginal bleeding, vaginal discharge and vaginal pain. Musculoskeletal: Negative for arthralgias, back pain, joint swelling, neck pain and neck stiffness. Skin: Negative for color change and rash. Neurological: Negative for dizziness, tremors, seizures, syncope, facial asymmetry, speech difficulty, weakness, light-headedness, numbness and headaches. Hematological: Negative for adenopathy. Does not bruise/bleed easily. Psychiatric/Behavioral: Negative for agitation, confusion, dysphoric mood, hallucinations, self-injury, sleep disturbance and suicidal ideas. All other systems reviewed and are negative. Physical Exam   Constitutional: She is oriented to person, place, and time. She appears well-developed and well-nourished. No distress. HENT:   Head: Normocephalic and atraumatic. Left Ear: External ear normal.   Mouth/Throat: Oropharynx is clear and moist. No oropharyngeal exudate. Eyes: Pupils are equal, round, and reactive to light.  Conjunctivae and EOM are normal. Right eye exhibits no discharge. Left eye exhibits no discharge. Neck: Normal range of motion. No JVD present. No tracheal deviation present. Cardiovascular: Normal rate, regular rhythm, normal heart sounds and intact distal pulses. Exam reveals no gallop and no friction rub. No murmur heard. Pulmonary/Chest: Effort normal and breath sounds normal. No stridor. No respiratory distress. She has no wheezes. She has no rales. She exhibits no tenderness. Abdominal: Soft. Bowel sounds are normal. She exhibits no distension and no mass. There is no tenderness. There is no rebound and no guarding. Musculoskeletal: Normal range of motion. She exhibits no edema or tenderness. Lymphadenopathy:     She has no cervical adenopathy. Neurological: She is alert and oriented to person, place, and time. She displays normal reflexes. No cranial nerve deficit. She exhibits normal muscle tone. Coordination normal.   Skin: No rash noted. Psychiatric: She has a normal mood and affect.  Her behavior is normal. Judgment and thought content normal.       Procedures    MDM  Results for orders placed or performed during the hospital encounter of 06/13/19   Comprehensive Metabolic Panel   Result Value Ref Range    Sodium 134 (L) 136 - 145 mmol/L    Potassium 4.1 3.5 - 5.1 mmol/L    Chloride 99 99 - 110 mmol/L    CO2 21 21 - 32 mmol/L    Anion Gap 14 3 - 16    Glucose 134 (H) 70 - 99 mg/dL    BUN 13 7 - 20 mg/dL    CREATININE 1.0 0.6 - 1.2 mg/dL    GFR Non-African American 54 (A) >60    GFR African American >60 >60    Calcium 8.2 (L) 8.3 - 10.6 mg/dL    Total Protein 6.5 6.4 - 8.2 g/dL    Alb 2.9 (L) 3.4 - 5.0 g/dL    Albumin/Globulin Ratio 0.8 (L) 1.1 - 2.2    Total Bilirubin 0.6 0.0 - 1.0 mg/dL    Alkaline Phosphatase 105 40 - 129 U/L    ALT 20 10 - 40 U/L    AST 47 (H) 15 - 37 U/L    Globulin 3.6 g/dL   CBC Auto Differential   Result Value Ref Range    WBC 11.4 (H) 4.0 - 11.0 K/uL    RBC 3.84 (L) 4.00 - 5.20 M/uL    Hemoglobin 12.0 12.0 - 16.0 g/dL    Hematocrit 36.0 36.0 - 48.0 %    MCV 93.8 80.0 - 100.0 fL    MCH 31.3 26.0 - 34.0 pg    MCHC 33.4 31.0 - 36.0 g/dL    RDW 18.4 (H) 12.4 - 15.4 %    Platelets 409 098 - 731 K/uL    MPV 7.6 5.0 - 10.5 fL    PLATELET SLIDE REVIEW Adequate     Neutrophils % 78.0 %    Lymphocytes % 9.0 %    Monocytes % 8.0 %    Eosinophils % 0.0 %    Basophils % 0.0 %    Neutrophils # 9.3 (H) 1.7 - 7.7 K/uL    Lymphocytes # 1.1 1.0 - 5.1 K/uL    Monocytes # 0.9 0.0 - 1.3 K/uL    Eosinophils # 0.0 0.0 - 0.6 K/uL    Basophils # 0.0 0.0 - 0.2 K/uL    Bands Relative 4 0 - 7 %    Atypical Lymphocytes Relative 1 0 - 6 %    nRBC 1 (A) /100 WBC    Anisocytosis 2+ (A)     Macrocytes 1+ (A)     Polychromasia Occasional (A)     Poikilocytes Occasional (A)    Lactic Acid, Plasma   Result Value Ref Range    Lactic Acid 0.9 0.4 - 2.0 mmol/L   Brain Natriuretic Peptide   Result Value Ref Range    Pro- (H) 0 - 124 pg/mL   Troponin   Result Value Ref Range    Troponin <0.01 <0.01 ng/mL   EKG 12 Lead   Result Value Ref Range    Ventricular Rate 98 BPM    Atrial Rate 98 BPM    P-R Interval 160 ms    QRS Duration 80 ms    Q-T Interval 360 ms    QTc Calculation (Bazett) 459 ms    P Axis 26 degrees    R Axis 8 degrees    T Axis 40 degrees    Diagnosis       Sinus rhythm with Premature supraventricular complexesLow voltage QRSBorderline ECGWhen compared with ECG of 16-MAY-2019 16:29,No significant change was found       I spoke with Dr. Danna Cordova nurse practitioner Ely Delarosa CNP. We thoroughly discussed the history, physical exam, laboratory and imaging studies, as well as, emergency department course. Based upon that discussion, we've decided to admit Radha Mackey for further observation and evaluation of Adrianna Rojas Parker's dyspnea.   As I have deemed necessary from their history, physical, and studies, I have considered and evaluated Radha Mackey for the following diagnoses:  ACUTE CORONARY SYNDROME, CHRONIC OBSTRUCTIVE PULMONARY DISEASE, CONGESTIVE HEART FAILURE, PERICARDIAL TAMPONADE, PNEUMONIA, PNEUMOTHORAX, PULMONARY EMBOLISM, SEPSIS, and THORACIC DISSECTION. FINAL IMPRESSION  1. Dyspnea and respiratory abnormalities    2. Hypoxia    3. H/O malignant neoplasm of breast    4. Irregular heart beat        Vitals:  Blood pressure 123/75, pulse 91, temperature 98.9 °F (37.2 °C), temperature source Oral, resp. rate 14, height 5' 2\" (1.575 m), weight 160 lb (72.6 kg), SpO2 96 %. Radiology  Xr Chest Standard (2 Vw)    Result Date: 6/13/2019  EXAMINATION: TWO XRAY VIEWS OF THE CHEST 6/13/2019 5:02 pm COMPARISON: CT chest June 6, 2019 and chest radiograph May 16, 2019. HISTORY: ORDERING SYSTEM PROVIDED HISTORY: sob TECHNOLOGIST PROVIDED HISTORY: Reason for exam:->sob Ordering Physician Provided Reason for Exam: sob; pt sts has breast CA, and it has spread to bones Acuity: Acute Type of Exam: Initial FINDINGS: Mild bilateral airspace disease is again seen with more focal linear consolidation in the right upper lobe. No pneumothorax or pleural effusion identified. Cardiac and mediastinal contours are without acute process. Left subclavian port is unchanged. Extensive osseous metastatic disease is again seen throughout the spine. No significant change compared to prior. Persistent bilateral airspace disease, overall similar in appearance to prior chest radiograph. Xr Chest Standard (2 Vw)    EKG Interpretation. The Ekg interpreted by me in the absence of a cardiologist shows. normal sinus rhythm with a rate of 98 with PACs present  Axis is   Normal  QTc is  within an acceptable range  Intervals and Durations are unremarkable. No specific ST-T wave changes appreciated. No evidence of acute ischemia.    No significant change from prior EKG dated 16 May 2019           Denise Hernandez MD  06/13/19 2139

## 2019-06-14 VITALS
HEART RATE: 94 BPM | TEMPERATURE: 98.7 F | WEIGHT: 158.3 LBS | RESPIRATION RATE: 14 BRPM | BODY MASS INDEX: 29.13 KG/M2 | OXYGEN SATURATION: 91 % | SYSTOLIC BLOOD PRESSURE: 112 MMHG | DIASTOLIC BLOOD PRESSURE: 77 MMHG | HEIGHT: 62 IN

## 2019-06-14 LAB
ANION GAP SERPL CALCULATED.3IONS-SCNC: 12 MMOL/L (ref 3–16)
BUN BLDV-MCNC: 14 MG/DL (ref 7–20)
CALCIUM SERPL-MCNC: 7.8 MG/DL (ref 8.3–10.6)
CHLORIDE BLD-SCNC: 101 MMOL/L (ref 99–110)
CO2: 23 MMOL/L (ref 21–32)
CREAT SERPL-MCNC: 1.1 MG/DL (ref 0.6–1.2)
EKG ATRIAL RATE: 98 BPM
EKG DIAGNOSIS: NORMAL
EKG P AXIS: 26 DEGREES
EKG P-R INTERVAL: 160 MS
EKG Q-T INTERVAL: 360 MS
EKG QRS DURATION: 80 MS
EKG QTC CALCULATION (BAZETT): 459 MS
EKG R AXIS: 8 DEGREES
EKG T AXIS: 40 DEGREES
EKG VENTRICULAR RATE: 98 BPM
GFR AFRICAN AMERICAN: 59
GFR NON-AFRICAN AMERICAN: 49
GLUCOSE BLD-MCNC: 124 MG/DL (ref 70–99)
HCT VFR BLD CALC: 31.9 % (ref 36–48)
HEMOGLOBIN: 10.7 G/DL (ref 12–16)
MCH RBC QN AUTO: 31.6 PG (ref 26–34)
MCHC RBC AUTO-ENTMCNC: 33.6 G/DL (ref 31–36)
MCV RBC AUTO: 94 FL (ref 80–100)
PDW BLD-RTO: 19.7 % (ref 12.4–15.4)
PLATELET # BLD: 391 K/UL (ref 135–450)
PMV BLD AUTO: 7.8 FL (ref 5–10.5)
POTASSIUM REFLEX MAGNESIUM: 4.1 MMOL/L (ref 3.5–5.1)
PROCALCITONIN: 0.51 NG/ML (ref 0–0.15)
RBC # BLD: 3.39 M/UL (ref 4–5.2)
SODIUM BLD-SCNC: 136 MMOL/L (ref 136–145)
WBC # BLD: 9.5 K/UL (ref 4–11)

## 2019-06-14 PROCEDURE — 94150 VITAL CAPACITY TEST: CPT

## 2019-06-14 PROCEDURE — 6370000000 HC RX 637 (ALT 250 FOR IP): Performed by: NURSE PRACTITIONER

## 2019-06-14 PROCEDURE — 85027 COMPLETE CBC AUTOMATED: CPT

## 2019-06-14 PROCEDURE — 80048 BASIC METABOLIC PNL TOTAL CA: CPT

## 2019-06-14 PROCEDURE — 2580000003 HC RX 258: Performed by: NURSE PRACTITIONER

## 2019-06-14 PROCEDURE — 94761 N-INVAS EAR/PLS OXIMETRY MLT: CPT

## 2019-06-14 PROCEDURE — 93010 ELECTROCARDIOGRAM REPORT: CPT | Performed by: INTERNAL MEDICINE

## 2019-06-14 PROCEDURE — 2700000000 HC OXYGEN THERAPY PER DAY

## 2019-06-14 PROCEDURE — 84145 PROCALCITONIN (PCT): CPT

## 2019-06-14 RX ORDER — LOSARTAN POTASSIUM 25 MG/1
50 TABLET ORAL DAILY
Status: DISCONTINUED | OUTPATIENT
Start: 2019-06-14 | End: 2019-06-14 | Stop reason: HOSPADM

## 2019-06-14 RX ORDER — ALBUTEROL SULFATE 2.5 MG/3ML
2.5 SOLUTION RESPIRATORY (INHALATION)
Status: DISCONTINUED | OUTPATIENT
Start: 2019-06-14 | End: 2019-06-14 | Stop reason: SDUPTHER

## 2019-06-14 RX ORDER — ONDANSETRON 2 MG/ML
4 INJECTION INTRAMUSCULAR; INTRAVENOUS EVERY 6 HOURS PRN
Status: DISCONTINUED | OUTPATIENT
Start: 2019-06-14 | End: 2019-06-14 | Stop reason: HOSPADM

## 2019-06-14 RX ORDER — SODIUM CHLORIDE 0.9 % (FLUSH) 0.9 %
10 SYRINGE (ML) INJECTION PRN
Status: DISCONTINUED | OUTPATIENT
Start: 2019-06-14 | End: 2019-06-14 | Stop reason: HOSPADM

## 2019-06-14 RX ORDER — CYCLOBENZAPRINE HCL 10 MG
10 TABLET ORAL 3 TIMES DAILY PRN
Status: DISCONTINUED | OUTPATIENT
Start: 2019-06-14 | End: 2019-06-14 | Stop reason: HOSPADM

## 2019-06-14 RX ORDER — ALBUTEROL SULFATE 2.5 MG/3ML
2.5 SOLUTION RESPIRATORY (INHALATION) EVERY 6 HOURS PRN
Status: DISCONTINUED | OUTPATIENT
Start: 2019-06-14 | End: 2019-06-14

## 2019-06-14 RX ORDER — HYDROCHLOROTHIAZIDE 25 MG/1
12.5 TABLET ORAL DAILY
Status: DISCONTINUED | OUTPATIENT
Start: 2019-06-14 | End: 2019-06-14 | Stop reason: HOSPADM

## 2019-06-14 RX ORDER — IPRATROPIUM BROMIDE AND ALBUTEROL SULFATE 2.5; .5 MG/3ML; MG/3ML
1 SOLUTION RESPIRATORY (INHALATION) EVERY 4 HOURS PRN
Status: DISCONTINUED | OUTPATIENT
Start: 2019-06-14 | End: 2019-06-14 | Stop reason: HOSPADM

## 2019-06-14 RX ORDER — OXYCODONE HYDROCHLORIDE AND ACETAMINOPHEN 5; 325 MG/1; MG/1
1 TABLET ORAL EVERY 4 HOURS PRN
Status: DISCONTINUED | OUTPATIENT
Start: 2019-06-14 | End: 2019-06-14 | Stop reason: HOSPADM

## 2019-06-14 RX ORDER — PANTOPRAZOLE SODIUM 40 MG/1
40 TABLET, DELAYED RELEASE ORAL
Status: DISCONTINUED | OUTPATIENT
Start: 2019-06-14 | End: 2019-06-14 | Stop reason: HOSPADM

## 2019-06-14 RX ORDER — LOSARTAN POTASSIUM AND HYDROCHLOROTHIAZIDE 12.5; 5 MG/1; MG/1
1 TABLET ORAL DAILY
Status: DISCONTINUED | OUTPATIENT
Start: 2019-06-14 | End: 2019-06-14 | Stop reason: CLARIF

## 2019-06-14 RX ORDER — IPRATROPIUM BROMIDE AND ALBUTEROL SULFATE 2.5; .5 MG/3ML; MG/3ML
1 SOLUTION RESPIRATORY (INHALATION)
Status: DISCONTINUED | OUTPATIENT
Start: 2019-06-14 | End: 2019-06-14

## 2019-06-14 RX ORDER — ALBUTEROL SULFATE 2.5 MG/3ML
2.5 SOLUTION RESPIRATORY (INHALATION) EVERY 6 HOURS PRN
Status: DISCONTINUED | OUTPATIENT
Start: 2019-06-14 | End: 2019-06-14 | Stop reason: HOSPADM

## 2019-06-14 RX ORDER — FENTANYL 100 UG/H
1 PATCH TRANSDERMAL
Status: DISCONTINUED | OUTPATIENT
Start: 2019-06-14 | End: 2019-06-14 | Stop reason: HOSPADM

## 2019-06-14 RX ORDER — MECLIZINE HCL 12.5 MG/1
25 TABLET ORAL 3 TIMES DAILY PRN
Status: DISCONTINUED | OUTPATIENT
Start: 2019-06-14 | End: 2019-06-14 | Stop reason: HOSPADM

## 2019-06-14 RX ORDER — SODIUM CHLORIDE 0.9 % (FLUSH) 0.9 %
10 SYRINGE (ML) INJECTION EVERY 12 HOURS SCHEDULED
Status: DISCONTINUED | OUTPATIENT
Start: 2019-06-14 | End: 2019-06-14 | Stop reason: HOSPADM

## 2019-06-14 RX ORDER — PREDNISONE 20 MG/1
20 TABLET ORAL 2 TIMES DAILY
Status: DISCONTINUED | OUTPATIENT
Start: 2019-06-14 | End: 2019-06-14 | Stop reason: HOSPADM

## 2019-06-14 RX ADMIN — PREDNISONE 20 MG: 20 TABLET ORAL at 08:36

## 2019-06-14 RX ADMIN — RIVAROXABAN 20 MG: 20 TABLET, FILM COATED ORAL at 08:36

## 2019-06-14 RX ADMIN — Medication 10 ML: at 08:40

## 2019-06-14 RX ADMIN — PANTOPRAZOLE SODIUM 40 MG: 40 TABLET, DELAYED RELEASE ORAL at 08:36

## 2019-06-14 RX ADMIN — HYDROCHLOROTHIAZIDE 12.5 MG: 25 TABLET ORAL at 08:36

## 2019-06-14 RX ADMIN — OXYCODONE HYDROCHLORIDE AND ACETAMINOPHEN 1 TABLET: 5; 325 TABLET ORAL at 00:33

## 2019-06-14 RX ADMIN — SERTRALINE HYDROCHLORIDE 50 MG: 50 TABLET ORAL at 08:36

## 2019-06-14 RX ADMIN — CYCLOBENZAPRINE HYDROCHLORIDE 10 MG: 10 TABLET, FILM COATED ORAL at 00:33

## 2019-06-14 RX ADMIN — LOSARTAN POTASSIUM 50 MG: 25 TABLET, FILM COATED ORAL at 08:36

## 2019-06-14 ASSESSMENT — PAIN SCALES - GENERAL
PAINLEVEL_OUTOF10: 5
PAINLEVEL_OUTOF10: 0

## 2019-06-14 NOTE — PLAN OF CARE
Problem: Safety:  Goal: Free from accidental physical injury  Description  Free from accidental physical injury  Outcome: Ongoing  Note:   Pt at risk for accidental injury. Pt will remain free from injury during admission. Pt educated on the use of the call light and the need to have an active bed alarm. Pt will call out prior to ambulation. Pt's pathway will remain free from tripping hazards. Pt's bed is in lowest position, call light in reach, and active and audible bed alarm. Pt has no further needs at this time. Will continue to monitor.

## 2019-06-14 NOTE — CARE COORDINATION
CM aware pt needing new home O2, Spoke to Matthew Burleson with [employer] and [employer] [facility] for Order.  CM following-Jessica Ruggiero RN

## 2019-06-14 NOTE — ED NOTES
[place] ([facility]) sandwich, pudding, and drink given to pt. Pt comfortable in bed with family at bedside.       Quinton aDo  06/13/19 2030

## 2019-06-14 NOTE — CARE COORDINATION
able to live independently?:  Yes  Hearing and Vision  Visual Impairment:  Reading glasses  Hearing Impairment:  None  Care Transitions Interventions       Met with patient to discuss care transition. Role of CTC and care transition program explained to patient. Spoke with patient who states she resides in a private residence alone. Patient states she is independent with ADL's and grandson/family helps with transportation when needed. Patient denied the need for any home care services at discharge and plans to return home. If meets criteria, patient agreeable to participate in care transition program post discharge. Encouraged patient to ask to speak with  on the unit should any needs arise. Notified DAVID Ruggiero RN of the above.       Follow Up  Future Appointments   Date Time Provider Lit Vaughan   6/19/2019  1:45 PM MD Steve Torres   9/16/2019  2:00 PM Dane Hyatt  [facility]  Health Maintenance Due   Topic Date Due    Lipid screen  05/10/2018       Leda Man RN, BSN, [address] [facility] Transitions Coordinator    [phone]

## 2019-06-14 NOTE — PROGRESS NOTES
RESPIRATORY THERAPY ASSESSMENT    Name:  Sav Sheldon Record Number:  [id number]  Age: 68 y.o. Gender: female  : 1945  Today's Date:  2019  Room:  [address] [place] [address]-    Assessment     Is the patient being admitted for a COPD or Asthma exacerbation? No   (If yes the patient will be seen every 4 hours for the first 24 hours and then reassessed)    Patient Admission Diagnosis      Allergies  Allergies   Allergen Reactions    Codeine Other (See Comments)     Hallucinations       Minimum Predicted Vital Capacity:     751          Actual Vital Capacity:      N/A              Pulmonary History:COPD and Asthma  Home Oxygen Therapy:  room air  Home Respiratory Therapy:None   Current Respiratory Therapy:  Albuterol/Atrovent HHN Q4WA          Respiratory Severity Index(RSI)   Patients with orders for inhalation medications, oxygen, or any therapeutic treatment modality will be placed on Respiratory Protocol. They will be assessed with the first treatment and at least every 72 hours thereafter. The following severity scale will be used to determine frequency of treatment intervention.     Smoking History: Smoking History Less than 1ppd or less than 15 pack year = 1    Social History  Social History     Tobacco Use    Smoking status: Former Smoker     Packs/day: 0.02     Years: 5.00     Pack years: 0.10     Types: Cigarettes     Last attempt to quit: 1999     Years since quittin.4    Smokeless tobacco: Never Used   Substance Use Topics    Alcohol use: No    Drug use: No       Recent Surgical History: None = 0  Past Surgical History  Past Surgical History:   Procedure Laterality Date    APPENDECTOMY      BREAST SURGERY      CARPAL TUNNEL RELEASE      B/L hands    CHOLECYSTECTOMY      COLONOSCOPY  2004    ENDOSCOPY, COLON, DIAGNOSTIC      EYE SURGERY Right 10/11/2018    PHACO EMULSIFICATION OF CATARACT WITH  INTRAOCULAR LENS IMPLANT RIGHT EYE     HEMORRHOID SURGERY      PRN    Electronically signed by Riley Altman RCP on 6/14/2019 at 12:53 AM    Respiratory Protocol Guidelines     1. Assessment and treatment by Respiratory Therapy will be initiated for medication and therapeutic interventions upon initiation of aerosolized medication. 2. Physician will be contacted for respiratory rate (RR) greater than 35 breaths per minute. Therapy will be held for heart rate (HR) greater than 140 beats per minute, pending direction from physician. 3. Bronchodilators will be administered via Metered Dose Inhaler (MDI) with spacer when the following criteria are met:  a. Alert and cooperative     b. HR < 140 bpm  c. RR < 30 bpm                d. Can demonstrate a 2-3 second inspiratory hold  4. Bronchodilators will be administered via Hand Held Nebulizer MAURICIO [facility]) to patients when ANY of the following criteria are met  a. Incognizant or uncooperative          b. Patients treated with HHN at Home        c. Unable to demonstrate proper use of MDI with spacer     d. RR > 30 bpm   5. Bronchodilators will be delivered via Metered Dose Inhaler (MDI), HHN, Aerogen to intubated patients on mechanical ventilation. 6. Inhalation medication orders will be delivered and/or substituted as outlined below. Aerosolized Medications Ordering and Administration Guidelines:    1. All Medications will be ordered by a physician, and their frequency and/or modality will be adjusted as defined by the patients Respiratory Severity Index (RSI) score. 2. If the patient does not have documented COPD, consider discontinuing anticholinergics when RSI is less than 9.  3. If the bronchospasm worsens (increased RSI), then the bronchodilator frequency can be increased to a maximum of every 4 hours. If greater than every 4 hours is required, the physician will be contacted.   4. If the bronchospasm improves, the frequency of the bronchodilator can be decreased, based on the patient's RSI, but not less than home treatment regimen frequency. 5. Bronchodilator(s) will be discontinued if patient has a RSI less than 9 and has received no scheduled or as needed treatment for 72  Hrs. Patients Ordered on a Mucolytic Agent:    1. Must always be administered with a bronchodilator. 2. Discontinue if patient experiences worsened bronchospasm, or secretions have lessened to the point that the patient is able to clear them with a cough. Anti-inflammatory and Combination Medications:    1. If the patient lacks prior history of lung disease, is not using inhaled anti-inflammatory medication at home, and lacks wheezing by examination or by history for at least 24 hours, contact physician for possible discontinuation.

## 2019-06-14 NOTE — DISCHARGE SUMMARY
appearance to prior   chest radiograph. Consults:     IP CONSULT TO HOSPITALIST  IP CONSULT TO ONCOLOGY    Disposition:  home     Condition at Discharge: Stable    Discharge Instructions/Follow-up:  hemonc as outpt, wean off oxygen as outpt as tolerated    Code Status:  Full Code     Activity: activity as tolerated    Diet: cardiac diet and low sodium      Discharge Medications:     Discharge Medication List as of 6/14/2019  4:16 PM           Details   omeprazole (PRILOSEC) 20 MG delayed release capsule TAKE ONE CAPSULE BY MOUTH EVERY DAY, Disp-90 capsule, R-1Normal      sertraline (ZOLOFT) 50 MG tablet TAKE 1 TABLET BY MOUTH DAILY, Disp-90 tablet, R-1Normal      fentaNYL (DURAGESIC) 100 MCG/HR Place 1 patch onto the skin every 72 hours. .Historical Med      XARELTO 20 MG TABS tablet TAKE 1 TABLET BY MOUTH DAILY WITH BREAKFAST, Disp-30 tablet, R-5Normal      losartan-hydrochlorothiazide (HYZAAR) 50-12.5 MG per tablet TAKE 1 TABLET BY MOUTH EVERY DAY, Disp-90 tablet, R-3Normal      oxyCODONE-acetaminophen (PERCOCET) 5-325 MG per tablet Take 1-2 tablets by mouth every 4 hours as needed for pain., Disp-120 tablet, R-0Print      potassium chloride (KLOR-CON M) 20 MEQ extended release tablet Take 1 tablet by mouth 2 times daily, Disp-60 tablet, R-3Normal      cyclobenzaprine (FLEXERIL) 10 MG tablet Take 1 tablet by mouth 3 times daily as needed for Muscle spasms, Disp-90 tablet, R-3Normal      meclizine (ANTIVERT) 25 MG tablet Take 1 tablet by mouth 3 times daily as needed, Disp-30 tablet, R-3      Spacer/Aero-Holding Chambers (POCKET SPACER) RYAN Starting 8/14/2013, Until Discontinued, Disp-1 Device, R-5, NormalUse with inhaler             Time Spent on discharge is more than 20 minutes in the examination, evaluation, counseling and review of medications and discharge plan.       Signed:    Ivana Lovelace MD   6/14/2019      Thank you Parisa Christianson MD for the opportunity to be involved in this

## 2019-06-14 NOTE — ED NOTES
[facility] hosp @ 2117  RE: admit consult per Dr. Yenny Metzger NP called back @ 2137     Rosanne Swann  06/13/19 2122

## 2019-06-14 NOTE — PROGRESS NOTES
INTRAOCULAR LENS IMPLANT RIGHT EYE     HEMORRHOID SURGERY      HYSTERECTOMY  1984    INSERTION / REMOVAL / REPLACEMENT VENOUS ACCESS CATHETER Left 4/2/2019    PORT INSERTION performed by Gaby Madrigal MD at [address] Right 12/7/2018    BREAST MASTECTOMY performed by Gaby Madrigal MD at [address] [place] [address] Left 9/13/2018    PHACO EMULSIFICATION OF CATARACT WITH INTRAOCULAR LENS IMPLANT LEFT EYE performed by Kaye Garcia MD at [address] [place] [address] Right 10/11/2018    PHACO EMULSIFICATION OF CATARACT WITH  INTRAOCULAR LENS IMPLANT RIGHT EYE performed by Kaye Garcia MD at [facility]       Level of Consciousness: Alert, Oriented, and Cooperative = 0    Level of Activity: Walking unassisted = 0    Respiratory Pattern: Dyspnea with exertion;Irregular pattern;or RR less than 6 = 2    Breath Sounds: Diminshed bilaterally and/or crackles = 2    Sputum   ,  ,    Cough: Strong, spontaneous, non-productive = 0    Vital Signs   /71   Pulse 75   Temp 98.1 °F (36.7 °C) (Oral)   Resp 16   Ht 5' 2\" (1.575 m)   Wt 158 lb 4.8 oz (71.8 kg)   SpO2 97%   BMI 28.95 kg/m²   SPO2 (COPD values may differ): 90-91% on room air or greater than 92% on FiO2 24- 28% = 1    Peak Flow (asthma only): not applicable = 0    RSI: 5-6 = Q4hr PRN (every four hours as needed) for dyspnea        Plan       Goals: improve oxygenation    Patient/caregiver was educated on the proper method of use for Respiratory Care Devices:  Yes      Level of patient/caregiver understanding able to:   ? Verbalize understanding   ? Demonstrate understanding       ? Teach back        ? Needs reinforcement       ? No available caregiver               ? Other:     Response to education:  Excellent     Is patient being placed on Home Treatment Regimen? Yes     Does the patient have everything they need prior to discharge? NA     Comments: Patient assessed and chart reviewed. Discussed with patient pulmonary disease history, smoking history, surgical history, home oxygen usage, and home respiratory medications. Treatment plan discussed with patient, and patient is in agreement. Plan of Care: DuoNeb HHN Q4H PRN with Albuterol HHN Q6H PRN. Re-evaluate as needed. Electronically signed by Dori Andrews RCP on 6/14/2019 at 8:39 AM    Respiratory Protocol Guidelines     1. Assessment and treatment by Respiratory Therapy will be initiated for medication and therapeutic interventions upon initiation of aerosolized medication. 2. Physician will be contacted for respiratory rate (RR) greater than 35 breaths per minute. Therapy will be held for heart rate (HR) greater than 140 beats per minute, pending direction from physician. 3. Bronchodilators will be administered via Metered Dose Inhaler (MDI) with spacer when the following criteria are met:  a. Alert and cooperative     b. HR < 140 bpm  c. RR < 30 bpm                d. Can demonstrate a 2-3 second inspiratory hold  4. Bronchodilators will be administered via Hand Held Nebulizer MAURICIO [facility]) to patients when ANY of the following criteria are met  a. Incognizant or uncooperative          b. Patients treated with HHN at Home        c. Unable to demonstrate proper use of MDI with spacer     d. RR > 30 bpm   5. Bronchodilators will be delivered via Metered Dose Inhaler (MDI), HHN, Aerogen to intubated patients on mechanical ventilation. 6. Inhalation medication orders will be delivered and/or substituted as outlined below. Aerosolized Medications Ordering and Administration Guidelines:    1. All Medications will be ordered by a physician, and their frequency and/or modality will be adjusted as defined by the patients Respiratory Severity Index (RSI) score.   2. If the patient does not have documented COPD, consider discontinuing anticholinergics when RSI is less than 9.  3. If the bronchospasm worsens (increased RSI), then the bronchodilator frequency can be increased to a maximum of every 4 hours. If greater than every 4 hours is required, the physician will be contacted. 4. If the bronchospasm improves, the frequency of the bronchodilator can be decreased, based on the patient's RSI, but not less than home treatment regimen frequency. 5. Bronchodilator(s) will be discontinued if patient has a RSI less than 9 and has received no scheduled or as needed treatment for 72  Hrs. Patients Ordered on a Mucolytic Agent:    1. Must always be administered with a bronchodilator. 2. Discontinue if patient experiences worsened bronchospasm, or secretions have lessened to the point that the patient is able to clear them with a cough. Anti-inflammatory and Combination Medications:    1. If the patient lacks prior history of lung disease, is not using inhaled anti-inflammatory medication at home, and lacks wheezing by examination or by history for at least 24 hours, contact physician for possible discontinuation.

## 2019-06-14 NOTE — PROGRESS NOTES
Pt educated on the use of the IS. Education included how to use the IS, what it is used for, and the frequency that it should be used. Minimum predicted volume the patient should be able to achieve is 750 mL. Patient was able to achieve 1000 mL.

## 2019-06-14 NOTE — ED NOTES
Patient resting in bed with daughter at bedside.  Updated patient we were waiting on admission orders     Parrish Smith, RN  06/13/19 [id number]

## 2019-06-14 NOTE — PROGRESS NOTES
Oxygen documentation:    1. O2 saturation at REST on ROOM AIR = _87%    If saturation is 89% or above please proceed with steps 2 and 3. 2. O2 saturation with AMBULATION of _____ feet on ROOM AIR = _____%  3.  O2 saturation with AMBULATION on _______ liter/min = ______%    DCP notified: yes

## 2019-06-14 NOTE — CONSULTS
ONCOLOGY HEMATOLOGY CARE CONSULT NOTE      Requesting Physician:  Dr. Sridhar Grimaldo:  68year-old female with metastatic triple neg breast cancer        HISTORY OF PRESENT ILLNESS:      Ms. Annalisa Miles  is a 68 y.o. female we are seeing in consultation for metastatic triple neg breast cancer. This patient is cared for by my partner, Dr. Tonja Porter. She is receiving 2nd-line Gemcitabine chemotherapy. She last received treatment on 5/30/2019. She was due for cycle 4, day 1 chemo on 6/13/2019 but was sent to the ER for dyspnea. A CXR showed stable lung disease compared to a CT scan on 6/06/2019. I did review her CT from 6/06/2019. The lung mets seem progressed to my eye.     Past Medical History:    Past Medical History:   Diagnosis Date    Abnormal CT scan, chest     Anemia 1/22/2015    Asthma     Axillary adenopathy     Breast cancer metastasized to bone [facility]) 2013    Stage IV adenocarcinoma    Breast mass 2013    Most likely metastatic right breast cancer    COPD (chronic obstructive pulmonary disease) (HCC)     DDD (degenerative disc disease), lumbar     GERD (gastroesophageal reflux disease)     H/O: hysterectomy     1984    Hemorrhoid     Removal in 1989    Hepatitis A 2011    Hilar adenopathy     Hot flashes     Hx of blood clots     1982    Hyperlipidemia     Hypertension     Osteoarthritis     knees    Osteopenia 2012    PE (pulmonary embolism) 1982    on BCP    Pneumonia     Pulmonary nodule      Past Surgical History:    Past Surgical History:   Procedure Laterality Date    APPENDECTOMY      BREAST SURGERY      CARPAL TUNNEL RELEASE  1993    B/L hands    CHOLECYSTECTOMY  1986    COLONOSCOPY  2004    ENDOSCOPY, COLON, DIAGNOSTIC      EYE SURGERY Right 10/11/2018    PHACO EMULSIFICATION OF CATARACT WITH  INTRAOCULAR LENS IMPLANT RIGHT EYE     HEMORRHOID SURGERY      HYSTERECTOMY  1984    INSERTION / REMOVAL / REPLACEMENT VENOUS ACCESS CATHETER Left 4/2/2019    PORT INSERTION performed by Lucrecia Hastings MD at [id number] Right 12/7/2018    BREAST MASTECTOMY performed by Lucrecia Hastings MD at [address] [place] [address] Left 9/13/2018    PHACO EMULSIFICATION OF CATARACT WITH INTRAOCULAR LENS IMPLANT LEFT EYE performed by Karrie Reddy MD at [address] [place] [address] Right 10/11/2018    PHACO EMULSIFICATION OF CATARACT WITH  INTRAOCULAR LENS IMPLANT RIGHT EYE performed by Karrie Reddy MD at [facility] OR       Current Medications:    Current Facility-Administered Medications   Medication Dose Route Frequency Provider Last Rate Last Dose    cyclobenzaprine (FLEXERIL) tablet 10 mg  10 mg Oral TID PRN Nichol Pong, APRN - CNP   10 mg at 06/14/19 0033    fentaNYL (DURAGESIC) 100 MCG/HR 1 patch  1 patch Transdermal Q72H Nichol Pong, APRN - CNP        meclizine (ANTIVERT) tablet 25 mg  25 mg Oral TID PRN Nichol Pong, APRN - CNP        pantoprazole (PROTONIX) tablet 40 mg  40 mg Oral QAM AC Nichol Pong, APRN - CNP        oxyCODONE-acetaminophen (PERCOCET) 5-325 MG per tablet 1 tablet  1 tablet Oral Q4H PRN Nichol Pong, APRN - CNP   1 tablet at 06/14/19 0033    sertraline (ZOLOFT) tablet 50 mg  50 mg Oral Daily Nichol Pong, APRN - CNP        rivaroxaban (XARELTO) tablet 20 mg  20 mg Oral Daily with breakfast Nichol Pong, APRN - CNP        sodium chloride flush 0.9 % injection 10 mL  10 mL Intravenous 2 times per day Nichol Pong, APRN - CNP        sodium chloride flush 0.9 % injection 10 mL  10 mL Intravenous PRN Nichol Pong, APRN - CNP        magnesium hydroxide (MILK OF MAGNESIA) 400 MG/5ML suspension 30 mL  30 mL Oral Daily PRN Nichol Pong, APRN - CNP        ondansetron [facility]) injection 4 mg  4 mg Intravenous Q6H PRN Nichol Pong, APRN - CNP        losartan (COZAAR) tablet 50 mg  50 mg Oral Daily Zoraida Dom, APRN - CNP        And    hydrochlorothiazide (HYDRODIURIL) tablet 12.5 mg  12.5 mg Oral Daily Zoraida Dom, APRN - CNP        ipratropium-albuterol (DUONEB) nebulizer solution 1 ampule  1 ampule Inhalation Q4H JEANIE Thibodeaux MD        albuterol (PROVENTIL) nebulizer solution 2.5 mg  2.5 mg Nebulization Q6H PRN Zoraida Dom, APRN - CNP        predniSONE (DELTASONE) tablet 20 mg  20 mg Oral BID Zoraida Dom, APRN - CNP         Allergies:     Allergies   Allergen Reactions    Codeine Other (See Comments)     Hallucinations       Social History:   Social History     Socioeconomic History    Marital status:      Spouse name: Not on file    Number of children: Not on file    Years of education: Not on file    Highest education level: Not on file   Occupational History    Not on file   Social Needs    Financial resource strain: Not on file    Food insecurity:     Worry: Not on file     Inability: Not on file    Transportation needs:     Medical: Not on file     Non-medical: Not on file   Tobacco Use    Smoking status: Former Smoker     Packs/day: 0.02     Years: 5.00     Pack years: 0.10     Types: Cigarettes     Last attempt to quit: 1999     Years since quittin.4    Smokeless tobacco: Never Used   Substance and Sexual Activity    Alcohol use: No    Drug use: No    Sexual activity: Never   Lifestyle    Physical activity:     Days per week: Not on file     Minutes per session: Not on file    Stress: Not on file   Relationships    Social connections:     Talks on phone: Not on file     Gets together: Not on file     Attends Yazdanism service: Not on file     Active member of club or organization: Not on file     Attends meetings of clubs or organizations: Not on file     Relationship status: Not on file    Intimate partner violence:     Fear of current or ex partner: Not on file     Emotionally abused: Not on file Physically abused: Not on file     Forced sexual activity: Not on file   Other Topics Concern    Not on file   Social History Narrative    Not on file          Family History:     Family History   Problem Relation Age of Onset    Arthritis Mother     Asthma Mother     Stroke Mother     Early Death Father     Arthritis Sister     Cancer Sister     Cancer Brother      REVIEW OF SYSTEMS:    Review of Systems    PHYSICAL EXAM:      Vitals:  /71   Pulse 75   Temp 98.1 °F (36.7 °C) (Oral)   Resp 16   Ht 5' 2\" (1.575 m)   Wt 158 lb 4.8 oz (71.8 kg)   SpO2 97%   BMI 28.95 kg/m²     CONSTITUTIONAL:  awake, alert, cooperative, no apparent distress, and appears stated age NAD  EYES:  pupils equal, round and reactive to light, extra ocular muscles intact,sclera clear, conjunctiva normal  NECK:  Supple, symmetrical, trachea midline, no adenopathy, thyroid symmetric, not enlarged and no tenderness, skin normal  HEMATOLOGIC/LYMPHATICS:  no cervical lymphadenopathy, nosupraclavicular lymphadenopathy, no axillary lymphadenopathy and no inguinal lymphadenopathy  LUNGS:  No increased work of breathing, good air exchange, clear to auscultation bilaterally, no crackles or wheezing  CARDIOVASCULAR:  , regular rate and rhythm, normal S1 and S2, no S3 or S4, and no murmur noted  ABDOMEN:  No scars, normal bowel sounds, soft, non-distended, non-tender, no masses palpated, no hepatosplenomegally      MUSCULOSKELETAL:  There is no redness, warmth, or swelling of the joints. Full range of motion noted. Motor strength is 5 out of 5 all extremities bilaterally. NEUROLOGIC:  Awake, alert, oriented to name, place andtime. Cranial nerves II-XII are grossly intact. Motor is 5 out of 5 bilaterally. SKIN:  no bruising or bleeding      DATA:    PT/INR:    Recent Labs     06/13/19  0900   PROT 6.5     PTT:No results for input(s): APTT in the last 72 hours.   CMP:    Lab Results   Component Value Date     06/14/2019 K 4.1 06/14/2019     06/14/2019    CO2 23 06/14/2019    BUN 14 06/14/2019    PROT 6.5 06/13/2019    PROT 7.5 08/24/2017     Magnesium:  No results found for: MG  Phosphorus:  No components found for: PO4  Calcium:  No components found for: CA  CBC:    Lab Results   Component Value Date    WBC 9.5 06/14/2019    RBC 3.39 06/14/2019    RBC 4.49 08/24/2017    HGB 10.7 06/14/2019    HCT 31.9 06/14/2019    MCV 94.0 06/14/2019    RDW 19.7 06/14/2019     06/14/2019     DIFF:    Lab Results   Component Value Date    MCV 94.0 06/14/2019    RDW 19.7 06/14/2019      LDH:  @labcrnt(LDH)@  Uric Acid:  @labcrnt(URIC)@    Radiology Review:  CT CHEST ABDOMEN PELVIS W CONTRAST (6/06/2019): Impression   Nodularity seen throughout the lungs bilaterally, with a more focal area on   the right.  In the absence of clinical signs of infection, this nodularity is   suggestive of pulmonary metastasis rather than pneumonia       Nodular septal thickening bilaterally with small bilateral pleural effusions   suggesting lymphangitic metastasis       Nonspecific skin thickening right breast.  Correlate with physical exam for   signs of cellulitis.       Widespread osseous metastatic disease appears similar       Wall thickening of the distal colon either due to incomplete distention or   wall thickening from underlying colitis       Intra and extrahepatic biliary ductal dilatation, similar to prior. Rakesh Osier is   a questionable stone in the distal common duct versus artifact.         XR CHEST STANDARD (2 VW) (6/13/2019):   Impression   Persistent bilateral airspace disease, overall similar in appearance to prior   chest radiograph.         Problem List  Patient Active Problem List   Diagnosis    GERD (gastroesophageal reflux disease)    Malignant neoplasm of upper-outer quadrant of right breast in female, estrogen receptor negative [facility] [address]    DDD (degenerative disc disease), lumbar    Anemia    Estrogen receptor negative    Acute deep vein thrombosis (DVT) of distal end of right lower extremity ([address]    Essential hypertension    Mixed hyperlipidemia    Generalized anxiety disorder    Primary osteoarthritis involving multiple joints    Secondary malignant neoplasm of bone ([address]    Breast cancer in female [facility])    Acute respiratory failure with hypoxia ([address]       IMPRESSION/RECOMMENDATIONS:  1.) Met triple neg breast cancer  - Presently being treated with second-line Gemcitabine.    - She was due for cycle 4, day 1 on 6/13 but was sent to the ER for dyspnea. 2.) Dyspnea  - Appears to be due to lung mets. - Home O2, Prednisone, duonebs. - She is comfortable at rest.  No fever. Dry cough. Suspect this is related to her lung mets. - Would recommend home O2. She can switch to another line of chemo as an outpatient. 3.) RLE DVT  - Diagnosed on 11/15/2016.  - Takes Xarelto. Thank you for asking me to see the patient.        Mireille Brar MD  [employer]

## 2019-06-14 NOTE — PROGRESS NOTES
4 Eyes Skin Assessment     The patient is being assess for  Admission    I agree that 2 RN's have performed a thorough Head to Toe Skin Assessment on the patient. ALL assessment sites listed below have been assessed. Areas assessed by both nurses:   [x]   Head, Face, and Ears   [x]   Shoulders, Back, and Chest  [x]   Arms, Elbows, and Hands   [x]   Coccyx, Sacrum, and Ischum  [x]   Legs, Feet, and Heels        Does the Patient have Skin Breakdown?   No         Matthew Prevention initiated:  No   Wound Care Orders initiated:  No      [employer] nurse consulted for Pressure Injury (Stage 3,4, Unstageable, DTI, NWPT, and Complex wounds):  No      Nurse 1 eSignature: Electronically signed by Jennifer Ricks RN on 6/14/19 at 2:07 AM    **SHARE this note so that the co-signing nurse is able to place an eSignature**    Nurse 2 eSignature: Electronically signed by Marva Platt RN on 6/14/19 at 3:11 AM

## 2019-06-14 NOTE — ED NOTES
Updated patient and family of room assignment      Vianney Pride, [address] [facility]  06/13/19 2083

## 2019-06-14 NOTE — H&P
Kwabena Collazo MD   cyclobenzaprine (FLEXERIL) 10 MG tablet Take 1 tablet by mouth 3 times daily as needed for Muscle spasms 6/1/17  Yes Richie Pitt MD   meclizine (ANTIVERT) 25 MG tablet Take 1 tablet by mouth 3 times daily as needed 2/2/16  Yes Bella Acevedo MD   Spacer/Aero-Holding Chambers (POCKET SPACER) RYAN by Does not apply route. Use with inhaler 8/14/13  Yes Kelby Small MD       Allergies:  Codeine    Social History:      The patient currently lives alone    TOBACCO:   reports that she quit smoking about 20 years ago. Her smoking use included cigarettes. She has a 0.10 pack-year smoking history. She has never used smokeless tobacco.  ETOH:   reports that she does not drink alcohol. Family History:      Reviewed in detail. Positive as follows:        Problem Relation Age of Onset    Arthritis Mother     Asthma Mother     Stroke Mother     Early Death Father     Arthritis Sister     Cancer Sister     Cancer Brother        REVIEW OF SYSTEMS:   Pertinent positives as noted in the HPI. All other systems reviewed and negative. PHYSICAL EXAM PERFORMED:    /65   Pulse 76   Temp 98.4 °F (36.9 °C) (Oral)   Resp 16   Ht 5' 2\" (1.575 m)   Wt 158 lb 4.8 oz (71.8 kg)   SpO2 91%   BMI 28.95 kg/m²     General appearance:  Mild apparent distress, appears stated age and cooperative. HEENT:  Normal cephalic, atraumatic without obvious deformity. Pupils equal, round, and reactive to light. Extra ocular muscles intact. Conjunctivae/corneas clear. Neck: Supple, with full range of motion. No jugular venous distention. Trachea midline. Respiratory:  increased respiratory effort. dim to auscultation, bilaterally without Rales/Wheezes/Rhonchi. Cardiovascular:  Regular rate and rhythm with normal S1/S2 without murmurs, rubs or gallops. Abdomen: Soft, non-tender, non-distended with normal bowel sounds. Musculoskeletal:  No clubbing, cyanosis or edema bilaterally.   Full range of motion without deformity. Skin: Skin color, texture, turgor normal.  No rashes or lesions. Neurologic:  Neurovascularly intact without any focal sensory/motor deficits. Cranial nerves: II-XII intact, grossly non-focal.  Psychiatric:  Alert and oriented, thought content appropriate, normal insight  Capillary Refill: Brisk,< 3 seconds   Peripheral Pulses: +2 palpable, equal bilaterally       Labs:     Recent Labs     06/13/19  0900 06/14/19  0435   WBC 11.4* 9.5   HGB 12.0 10.7*   HCT 36.0 31.9*    391     Recent Labs     06/13/19  0900 06/14/19  0435   * 136   K 4.1 4.1   CL 99 101   CO2 21 23   BUN 13 14   CREATININE 1.0 1.1   CALCIUM 8.2* 7.8*     Recent Labs     06/13/19  0900   AST 47*   ALT 20   BILITOT 0.6   ALKPHOS 105     No results for input(s): INR in the last 72 hours. Recent Labs     06/13/19  0900   TROPONINI <0.01       Urinalysis:      Lab Results   Component Value Date    NITRU Negative 11/13/2013    WBCUA 3-5 11/13/2013    BACTERIA 2+ 11/13/2013    RBCUA 0-2 11/13/2013    BLOODU negative 05/06/2014    BLOODU Negative 11/13/2013    SPECGRAV 1.025 05/06/2014    SPECGRAV 1.025 11/13/2013    GLUCOSEU negative 05/06/2014    GLUCOSEU Negative 11/13/2013       Radiology:     CXR: I have reviewed the CXR with the following interpretation: see below  EKG:  I have reviewed the EKG with the following interpretation: Sinus rhythm with Premature supraventricular complexesLow voltage Mya Offer compared with ECG of 16-MAY-2019 16:29,No significant change was found    XR CHEST STANDARD (2 VW)   Final Result   Persistent bilateral airspace disease, overall similar in appearance to prior   chest radiograph.              ASSESSMENT:    Active Hospital Problems    Diagnosis Date Noted    Acute respiratory failure with hypoxia ([facility] [address] [J96.01] 06/13/2019    Malignant neoplasm of upper-outer quadrant of right breast in female, estrogen receptor negative ([facility] [address] [C50.411, Z17.1] PLAN:  Acute respiratory failure with hypoxia-patient has mets to lung  Duo nebs, steroids  Incentive spirometer  Oxygen therapy protocol  Initiate oncology consult  Right breast cancer, that has metastasized to lung, bone  Continue analgesia, antiemetics  Continue Xarelto        DVT Prophylaxis: xarelto  Diet: DIET CARDIAC; Low Sodium (2 GM)  Code Status: full code    PT/OT Eval Status: not ordered    Dispo -greater than [address], APRN - CNP    Thank you Girma Patricia MD for the opportunity to be involved in this patient's care. If you have any questions or concerns please feel free to contact me at [phone].

## 2019-06-15 ENCOUNTER — CARE COORDINATION (OUTPATIENT)
Dept: CASE MANAGEMENT | Age: 74
End: 2019-06-15

## 2019-06-15 NOTE — CARE COORDINATION
[facility] Transitions Initial Follow Up Call    Call within 2 business days of discharge: Yes    Patient: Mariana Torre Patient : 1945   MRN: [id number]  Reason for Admission: respiratory failure  Discharge Date: 19 RARS: Readmission Risk Score: 21      Last Discharge [facility]       Complaint Diagnosis Description Type Department Provider    19 Shortness of Breath Dyspnea and respiratory abnormalities . .. ED to Hosp-Admission (Discharged) (ADMITTED) Katiana Delacruz MD; Callie Borges. .. Spoke with: Davide Nielsen (patient)    Facility[facility]    Non-face-to-face services provided:  Obtained and reviewed discharge summary and/or continuity of care documents  Education of patient/family/caregiver/guardian to support self-management-s/s to report; f/up appt; take meds with food  Assessment and support for treatment adherence and medication management-declined full med review - no changes    Care Transitions 24 Hour Call    Schedule Follow Up Appointment with PCP:  Completed  Do you have any ongoing symptoms?:  No  Do you have a copy of your discharge instructions?:  Yes  Do you have all of your prescriptions and are they filled?:  Yes  Have you been contacted by a [address]?:  No  Have you scheduled your follow up appointment?:  Yes  How are you going to get to your appointment?:  Car - drive self  Were you discharged with any Home Care or Post Acute Services:  No  Patient DME:  Straight cane, Walker, Tub transfer bench  Do you have support at home?:  Alone  Do you feel like you have everything you need to keep you well at home?:  Yes  Are you an active caregiver in your home?:  No  Care Transitions Interventions  No Identified Needs       Davide Nielsen reports she feels well today. Had one episode of emesis yesterday. Blames it on eating a greasy sandwich after not having much PO intake for a few days. States [facility] put her on \"strong antibiotic once daily for 7 days\". Instructed her to take with food to decrease GI upset. She verbalizes understanding. She does not know name of abx and is not near bottle at this time to report. She will f/up with onc and new cardiology appt his week. She has new oxygen from [facility]. Wearing at 2lpm. Bought pulse oximeter yesterday as well. Was at 97% on 2lpm when she woke this morning. Denies SOB, fever, chills, cough, nausea, vomiting. She knows how to reach [facility] for equipment needs/concerns. She declines home care. Hopeful to see her grandchildren this week and glad she feels well.      Follow Up  Future Appointments   Date Time Provider Lit Anglini   6/19/2019  1:45 PM MD Dominga Greer   9/16/2019  2:00 PM Carlos Gamez MD [facility]       Raghu Gage, RN

## 2019-06-17 DIAGNOSIS — R06.09 DYSPNEA ON EXERTION: ICD-10-CM

## 2019-06-17 RX ORDER — RIVAROXABAN 20 MG/1
TABLET, FILM COATED ORAL
Qty: 30 TABLET | Refills: 5 | Status: SHIPPED | OUTPATIENT
Start: 2019-06-17 | End: 2020-01-07

## 2019-06-17 NOTE — TELEPHONE ENCOUNTER
Future appt scheduled 09/16/2019  Last appt 03/08/2019  Refilled medication per verbal order from provider.

## 2019-06-18 LAB — BLOOD CULTURE, ROUTINE: NORMAL

## 2019-06-18 NOTE — PROGRESS NOTES
Hemorrhoid, Hepatitis A, Hilar adenopathy, Hot flashes, Hx of blood clots, Hyperlipidemia, Hypertension, Osteoarthritis, Osteopenia, PE (pulmonary embolism), Pneumonia, and Pulmonary nodule. Surgical History:   has a past surgical history that includes Hysterectomy (1984); Cholecystectomy (1986); Carpal tunnel release (1993); Hemorrhoid surgery; Colonoscopy (2004); pr remv cataract extracap,insert lens (Left, 9/13/2018); eye surgery (Right, 10/11/2018); pr remv cataract extracap,insert lens (Right, 10/11/2018); Appendectomy; Breast surgery; Endoscopy, colon, diagnostic; Mastectomy (Right, 12/7/2018); and INSERTION / REMOVAL / REPLACEMENT VENOUS ACCESS CATHETER (Left, 4/2/2019). Social History:   reports that she quit smoking about 20 years ago. Her smoking use included cigarettes. She has a 0.10 pack-year smoking history. She has never used smokeless tobacco. She reports that she does not drink alcohol or use drugs. Family History:  No evidence for sudden cardiac death or premature CAD    Home Medications:  Reviewed and are listed in nursing record. and/or listed below  Current Outpatient Medications   Medication Sig Dispense Refill    XARELTO 20 MG TABS tablet TAKE 1 TABLET BY MOUTH DAILY WITH BREAKFAST 30 tablet 5    omeprazole (PRILOSEC) 20 MG delayed release capsule TAKE ONE CAPSULE BY MOUTH EVERY DAY 90 capsule 1    sertraline (ZOLOFT) 50 MG tablet TAKE 1 TABLET BY MOUTH DAILY 90 tablet 1    fentaNYL (DURAGESIC) 100 MCG/HR Place 1 patch onto the skin every 72 hours. Chyna Aden losartan-hydrochlorothiazide (HYZAAR) 50-12.5 MG per tablet TAKE 1 TABLET BY MOUTH EVERY DAY 90 tablet 3    oxyCODONE-acetaminophen (PERCOCET) 5-325 MG per tablet Take 1-2 tablets by mouth every 4 hours as needed for pain.  120 tablet 0    potassium chloride (KLOR-CON M) 20 MEQ extended release tablet Take 1 tablet by mouth 2 times daily 60 tablet 3    cyclobenzaprine (FLEXERIL) 10 MG tablet Take 1 tablet by mouth 3 times daily as needed for Muscle spasms 90 tablet 3    meclizine (ANTIVERT) 25 MG tablet Take 1 tablet by mouth 3 times daily as needed 30 tablet 3    Spacer/Aero-Holding Chambers (POCKET SPACER) RYAN by Does not apply route. Use with inhaler 1 Device 5     No current facility-administered medications for this visit. Allergies:  Codeine     Review of Systems:   A 14 point review of symptoms completed. Pertinent positives identified in the HPI, all other review of symptoms negative as below.     Objective:   PHYSICAL EXAM:    Vitals:    06/19/19 1357   BP: 100/62   Pulse: 103   SpO2: 93%    Weight: 160 lb 6.4 oz (72.8 kg)     Wt Readings from Last 3 Encounters:   06/19/19 160 lb 6.4 oz (72.8 kg)   06/14/19 158 lb 4.8 oz (71.8 kg)   05/16/19 164 lb (74.4 kg)         General Appearance:  Alert, cooperative, no distress, appears stated age   Head:  Normocephalic, atraumatic   Eyes:  PERRL, conjunctiva/corneas clear   Nose: Nares normal, no drainage or sinus tenderness   Throat: Lips, mucosa, and tongue normal   Neck: Supple, symmetrical, trachea midline, NL thyroid no carotid bruit or JVD   Lungs:   CTAB, respirations unlabored   Chest Wall:  No tenderness or deformity   Heart:  Regular rhythm and normal rate; S1, S2 are normal;   no murmur noted; no rub or gallop   Abdomen:   Soft, non-tender, +BS x 4, no masses, no organomegaly   Extremities: Extremities normal, atraumatic, no cyanosis or edema   Pulses: 2+ and symmetric   Skin: Skin color, texture, turgor normal, no rashes or lesions   Pysch: Normal mood and affect   Neurologic: Normal gross motor and sensory exam.         LABS   CBC:      Lab Results   Component Value Date    WBC 9.5 06/14/2019    RBC 3.39 06/14/2019    RBC 4.49 08/24/2017    HGB 10.7 06/14/2019    HCT 31.9 06/14/2019    MCV 94.0 06/14/2019    RDW 19.7 06/14/2019     06/14/2019     CMP:  Lab Results   Component Value Date     06/14/2019    K 4.1 06/14/2019     06/14/2019    CO2 23 2019    BUN 14 2019    CREATININE 1.1 2019    GFRAA 59 2019    GFRAA >60 05/10/2013    AGRATIO 0.8 2019    LABGLOM 49 2019    GLUCOSE 124 2019    GLUCOSE 97 2017    PROT 6.5 2019    PROT 7.5 2017    CALCIUM 7.8 2019    BILITOT 0.6 2019    ALKPHOS 105 2019    AST 47 2019    ALT 20 2019     PT/INR:   No results found for: PTINR  Liver:  No components found for: CHLPL  Lab Results   Component Value Date    ALT 20 2019    AST 47 (H) 2019    ALKPHOS 105 2019    BILITOT 0.6 2019     No results found for: LABA1C  Lipids:         Lab Results   Component Value Date    TRIG 270 (H) 05/10/2013            Lab Results   Component Value Date    HDL 44 05/10/2013            Lab Results   Component Value Date    LDLCALC 109 (H) 05/10/2013            Lab Results   Component Value Date    LABVLDL 54 05/10/2013         CARDIAC DATA   EK2019 NSR with PACs, low limb voltage    ECHO/MUGA:    STRESS TEST:    CARDIAC CATH:    VASCULAR/OTHER IMAGING:  Chest CT Pulmonary 19  No evidence pulmonary embolism. Overall increased extent of masslike consolidation within the right upper lobe suspicious for progressing neoplasm and/or acute airspace disease. Otherwise stable findings including multifocal bilateral airspace opacification including nodular and ground-glass opacity, similar to 2018. Extensive skeletal disease are also again seen. Assessment and Plan   Dirk Issa is a 68 y.o. female who presents today for the following problems:      1. SOB: likely multifactorial but doubt cardiac etiology  2. Hx of DVT: on DOAC  3. Metastatic breast cancer to lung and bones   - followed by Dr. Ozzie Mcgrath MD Plan:  1. PT w/o angina or CHF s/s  2. Will get echo to ensure no pericardial effusion   - assess RV and filling pressures given DVT hx  3.  BNP and labs  If all above negative, would defer further workup to PCP. No angina so will defer stress test but if other testing negative, can consider test       Patient Active Problem List   Diagnosis    GERD (gastroesophageal reflux disease)    Malignant neoplasm of upper-outer quadrant of right breast in female, estrogen receptor negative ([address]    DDD (degenerative disc disease), lumbar    Anemia    Estrogen receptor negative    Acute deep vein thrombosis (DVT) of distal end of right lower extremity ([address]    Essential hypertension    Mixed hyperlipidemia    Generalized anxiety disorder    Primary osteoarthritis involving multiple joints    Secondary malignant neoplasm of bone ([address]    Breast cancer in female [facility])    Acute respiratory failure with hypoxia ([address]    SOB (shortness of breath)       Patient Plan:  1. Echocardiogram to evaluate size and strength of heart. 2. Labs - cbc, bnp, renal   3. We will call you with the results of the tests  4. Follow up dependent upon results       It is a pleasure to assist in the care of Lauren Hall. Please call with any questions. This note was scribed in the presence of Dr. Daisy Santos MD by Mariela Fulton RN. The scribes documentation has been prepared under my direction and personally reviewed by me in its entirety. I confirm that the note above accurately reflects all work, treatment, procedures, and medical decision making performed by me. I, Dr. Ebonie Michel MD, personally performed the services described in this documentation as scribed by Lady Odom in my presence, and it is both accurate and complete to the best of our ability.            Ebonie Michel MD, [address] [facility] Cardiologist  [facility]  [phone] [facility]  [phone] [address] [place] [address]

## 2019-06-19 ENCOUNTER — OFFICE VISIT (OUTPATIENT)
Dept: CARDIOLOGY CLINIC | Age: 74
End: 2019-06-19
Payer: MEDICARE

## 2019-06-19 VITALS
SYSTOLIC BLOOD PRESSURE: 100 MMHG | OXYGEN SATURATION: 93 % | DIASTOLIC BLOOD PRESSURE: 62 MMHG | HEART RATE: 103 BPM | BODY MASS INDEX: 29.52 KG/M2 | WEIGHT: 160.4 LBS | HEIGHT: 62 IN

## 2019-06-19 DIAGNOSIS — C79.51 SECONDARY MALIGNANT NEOPLASM OF BONE (HCC): ICD-10-CM

## 2019-06-19 DIAGNOSIS — C50.411 MALIGNANT NEOPLASM OF UPPER-OUTER QUADRANT OF RIGHT FEMALE BREAST, UNSPECIFIED ESTROGEN RECEPTOR STATUS (HCC): ICD-10-CM

## 2019-06-19 DIAGNOSIS — R06.02 SOB (SHORTNESS OF BREATH): Primary | ICD-10-CM

## 2019-06-19 DIAGNOSIS — Z86.718 HISTORY OF DVT (DEEP VEIN THROMBOSIS): ICD-10-CM

## 2019-06-19 PROCEDURE — 3017F COLORECTAL CA SCREEN DOC REV: CPT | Performed by: INTERNAL MEDICINE

## 2019-06-19 PROCEDURE — G8427 DOCREV CUR MEDS BY ELIG CLIN: HCPCS | Performed by: INTERNAL MEDICINE

## 2019-06-19 PROCEDURE — G8399 PT W/DXA RESULTS DOCUMENT: HCPCS | Performed by: INTERNAL MEDICINE

## 2019-06-19 PROCEDURE — 1111F DSCHRG MED/CURRENT MED MERGE: CPT | Performed by: INTERNAL MEDICINE

## 2019-06-19 PROCEDURE — 99204 OFFICE O/P NEW MOD 45 MIN: CPT | Performed by: INTERNAL MEDICINE

## 2019-06-19 PROCEDURE — 1036F TOBACCO NON-USER: CPT | Performed by: INTERNAL MEDICINE

## 2019-06-19 PROCEDURE — 1123F ACP DISCUSS/DSCN MKR DOCD: CPT | Performed by: INTERNAL MEDICINE

## 2019-06-19 PROCEDURE — 4040F PNEUMOC VAC/ADMIN/RCVD: CPT | Performed by: INTERNAL MEDICINE

## 2019-06-19 PROCEDURE — 1090F PRES/ABSN URINE INCON ASSESS: CPT | Performed by: INTERNAL MEDICINE

## 2019-06-19 PROCEDURE — G8417 CALC BMI ABV UP PARAM F/U: HCPCS | Performed by: INTERNAL MEDICINE

## 2019-06-19 NOTE — PATIENT INSTRUCTIONS
Patient Plan:  1. Echocardiogram to evaluate size and strength of heart. 2. Labs - cbc, bnp, renal   3. We will call you with the results of the tests  4.  Follow up dependent upon results

## 2019-06-19 NOTE — LETTER
[facility]   Cardiovascular Evaluation    PATIENT: Renny Alegria  DATE: 2019  MRN: [id number]  CSN: [id number]  : 1945      Primary Care Doctor: Wolfgang Novoa MD  Reason for evaluation:   New Patient and Shortness of Breath (sitting still and on exertion, constant)      Subjective:   History of present illness on initial date of evaluation:   Renny Alegria is a 68 y.o. patient who presents for follow up from the ED on 19 for SOB. She was admitted on 19 for increased SOB. History of Right Breast CA, that has metastasized to her lung and bone. Recent port placement 19. She presents in a wheelchair. She states her SOB is no better. SOB has been going on x 5 weeks. She states she is SOB just sitting. She states she sleeps well and does not wake up gasping for air. She uses oxygen (2L) at night as well as during the day. She denies chest pain, dizziness or syncope.        Patient Active Problem List   Diagnosis    GERD (gastroesophageal reflux disease)    Malignant neoplasm of upper-outer quadrant of right breast in female, estrogen receptor negative ([address]    DDD (degenerative disc disease), lumbar    Anemia    Estrogen receptor negative    Acute deep vein thrombosis (DVT) of distal end of right lower extremity ([address]    Essential hypertension    Mixed hyperlipidemia    Generalized anxiety disorder    Primary osteoarthritis involving multiple joints    Secondary malignant neoplasm of bone ([address]    Breast cancer in female [facility])    Acute respiratory failure with hypoxia ([address]    SOB (shortness of breath)         Past Medical History:   has a past medical history of Abnormal CT scan, chest, Anemia, Asthma, Axillary adenopathy, Breast cancer metastasized to bone ([address] Breast mass, COPD (chronic obstructive pulmonary disease) ([address] DDD (degenerative disc disease), lumbar, GERD (gastroesophageal reflux disease), H/O: hysterectomy, Hemorrhoid, Hepatitis A, Hilar adenopathy, Hot flashes, Hx of blood clots, Hyperlipidemia, Hypertension, Osteoarthritis, Osteopenia, PE (pulmonary embolism), Pneumonia, and Pulmonary nodule. Surgical History:   has a past surgical history that includes Hysterectomy (1984); Cholecystectomy (1986); Carpal tunnel release (1993); Hemorrhoid surgery; Colonoscopy (2004); pr remv cataract extracap,insert lens (Left, 9/13/2018); eye surgery (Right, 10/11/2018); pr remv cataract extracap,insert lens (Right, 10/11/2018); Appendectomy; Breast surgery; Endoscopy, colon, diagnostic; Mastectomy (Right, 12/7/2018); and INSERTION / REMOVAL / REPLACEMENT VENOUS ACCESS CATHETER (Left, 4/2/2019). Social History:   reports that she quit smoking about 20 years ago. Her smoking use included cigarettes. She has a 0.10 pack-year smoking history. She has never used smokeless tobacco. She reports that she does not drink alcohol or use drugs. Family History:  No evidence for sudden cardiac death or premature CAD    Home Medications:  Reviewed and are listed in nursing record. and/or listed below  Current Outpatient Medications   Medication Sig Dispense Refill    XARELTO 20 MG TABS tablet TAKE 1 TABLET BY MOUTH DAILY WITH BREAKFAST 30 tablet 5    omeprazole (PRILOSEC) 20 MG delayed release capsule TAKE ONE CAPSULE BY MOUTH EVERY DAY 90 capsule 1    sertraline (ZOLOFT) 50 MG tablet TAKE 1 TABLET BY MOUTH DAILY 90 tablet 1    fentaNYL (DURAGESIC) 100 MCG/HR Place 1 patch onto the skin every 72 hours. Damaris Barker losartan-hydrochlorothiazide (HYZAAR) 50-12.5 MG per tablet TAKE 1 TABLET BY MOUTH EVERY DAY 90 tablet 3    oxyCODONE-acetaminophen (PERCOCET) 5-325 MG per tablet Take 1-2 tablets by mouth every 4 hours as needed for pain.  120 tablet 0    potassium chloride (KLOR-CON M) 20 MEQ extended release tablet Take 1 tablet by mouth 2 times daily 60 tablet 3 Component Value Date     2019    K 4.1 2019     2019    CO2 23 2019    BUN 14 2019    CREATININE 1.1 2019    GFRAA 59 2019    GFRAA >60 05/10/2013    AGRATIO 0.8 2019    LABGLOM 49 2019    GLUCOSE 124 2019    GLUCOSE 97 2017    PROT 6.5 2019    PROT 7.5 2017    CALCIUM 7.8 2019    BILITOT 0.6 2019    ALKPHOS 105 2019    AST 47 2019    ALT 20 2019     PT/INR:   No results found for: PTINR  Liver:  No components found for: CHLPL  Lab Results   Component Value Date    ALT 20 2019    AST 47 (H) 2019    ALKPHOS 105 2019    BILITOT 0.6 2019     No results found for: LABA1C  Lipids:         Lab Results   Component Value Date    TRIG 270 (H) 05/10/2013            Lab Results   Component Value Date    HDL 44 05/10/2013            Lab Results   Component Value Date    LDLCALC 109 (H) 05/10/2013            Lab Results   Component Value Date    LABVLDL 54 05/10/2013         CARDIAC DATA   EK2019 NSR with PACs, low limb voltage    ECHO/MUGA:    STRESS TEST:    CARDIAC CATH:    VASCULAR/OTHER IMAGING:  Chest CT Pulmonary 19  No evidence pulmonary embolism. Overall increased extent of masslike consolidation within the right upper lobe suspicious for progressing neoplasm and/or acute airspace disease. Otherwise stable findings including multifocal bilateral airspace opacification including nodular and ground-glass opacity, similar to 2018. Extensive skeletal disease are also again seen. Assessment and Plan   Ree Hinds is a 68 y.o. female who presents today for the following problems:      1. SOB: likely multifactorial but doubt cardiac etiology  2. Hx of DVT: on DOAC  3. Metastatic breast cancer to lung and bones   - followed by Dr. John Springer MD Plan:  1. PT w/o angina or CHF s/s  2.  Will get echo to ensure no pericardial effusion

## 2019-06-20 ENCOUNTER — TELEPHONE (OUTPATIENT)
Dept: CARDIOLOGY CLINIC | Age: 74
End: 2019-06-20

## 2019-06-20 ENCOUNTER — CARE COORDINATION (OUTPATIENT)
Dept: CASE MANAGEMENT | Age: 74
End: 2019-06-20

## 2019-06-20 NOTE — CARE COORDINATION
[facility] Transitions Follow Up Call    2019    Patient: Paul Chauhan  Patient : 1945   MRN: [id number]  Reason for Admission: Respiratory Failure   Discharge Date: 19 RARS: Readmission Risk Score: 21         Spoke with: Chaitanya Shaikh patient's son     Care Transitions Subsequent and Final Call    Subsequent and Final Calls  Do you have any ongoing symptoms?:  No  Have your medications changed?:  No  Do you have any questions related to your medications?:  No  Do you currently have any active services?:  No  Do you have any needs or concerns that I can assist you with?:  No  Identified Barriers:  None  Care Transitions Interventions  Other Interventions:          Spoke with patient's son Chaitanya Shaikh who stated he was getting off work right now and would be headed to his mother's home to check on her. Chaitanya Shaikh said yesterday patient was doing pretty good and denied any issues with SOB or CP. Chaitanya Shaikh stated with everything she is going through she seems to be doing pretty well. Denies any needs at present time. Agreeable to f/u calls. Gave reminder that if they have any questions/concerns at anytime, they can always call PCP/specialist as they always have an MD on call .        Follow Up  Future Appointments   Date Time Provider Lit Vaughan   2019 12:30 PM [facility] ECHO ROOM 03 [facility] None   2019  2:00 PM Rohith Ray MD [facility] CHRISTEN Seaman RN, BSN, [facility] [facility] Transitions Coordinator    [phone]

## 2019-06-24 ENCOUNTER — CARE COORDINATION (OUTPATIENT)
Dept: CASE MANAGEMENT | Age: 74
End: 2019-06-24

## 2019-06-28 ENCOUNTER — TELEPHONE (OUTPATIENT)
Dept: CARDIOLOGY CLINIC | Age: 74
End: 2019-06-28

## 2019-06-28 ENCOUNTER — HOSPITAL ENCOUNTER (OUTPATIENT)
Dept: NON INVASIVE DIAGNOSTICS | Age: 74
Discharge: HOME | End: 2019-06-28
Payer: MEDICARE

## 2019-06-28 DIAGNOSIS — R06.02 SOB (SHORTNESS OF BREATH): ICD-10-CM

## 2019-06-28 LAB
LV EF: 55 %
LVEF MODALITY: NORMAL

## 2019-06-28 PROCEDURE — 93306 TTE W/DOPPLER COMPLETE: CPT

## 2019-06-28 NOTE — TELEPHONE ENCOUNTER
Notified pt of results. She has not had any SOB for the last 3 days. She will call us back if this changes.

## 2019-06-28 NOTE — TELEPHONE ENCOUNTER
----- Message from Evens Ratliff MD sent at 6/28/2019  2:31 PM EDT -----  Let pt know that echo did show elevated lung pressures.  If SOB continues, will need Right heart cath to eval and see if treatment is needed

## 2019-07-17 RX ORDER — LOSARTAN POTASSIUM AND HYDROCHLOROTHIAZIDE 12.5; 5 MG/1; MG/1
TABLET ORAL
Qty: 90 TABLET | Refills: 3 | Status: SHIPPED | OUTPATIENT
Start: 2019-07-17 | End: 2020-01-01

## 2019-08-19 ENCOUNTER — HOSPITAL ENCOUNTER (OUTPATIENT)
Dept: CT IMAGING | Age: 74
Discharge: HOME | End: 2019-08-19
Payer: MEDICARE

## 2019-08-19 ENCOUNTER — TELEPHONE (OUTPATIENT)
Dept: CARDIOLOGY CLINIC | Age: 74
End: 2019-08-19

## 2019-08-19 ENCOUNTER — HOSPITAL ENCOUNTER (OUTPATIENT)
Age: 74
Discharge: HOME | End: 2019-08-19
Payer: MEDICARE

## 2019-08-19 ENCOUNTER — CARE COORDINATION (OUTPATIENT)
Dept: CARE COORDINATION | Age: 74
End: 2019-08-19

## 2019-08-19 DIAGNOSIS — C50.411 MALIGNANT NEOPLASM OF UPPER-OUTER QUADRANT OF RIGHT FEMALE BREAST, UNSPECIFIED ESTROGEN RECEPTOR STATUS (HCC): ICD-10-CM

## 2019-08-19 DIAGNOSIS — R06.02 SOB (SHORTNESS OF BREATH): ICD-10-CM

## 2019-08-19 LAB
ALBUMIN SERPL-MCNC: 3.4 G/DL (ref 3.4–5)
ANION GAP SERPL CALCULATED.3IONS-SCNC: 8 MMOL/L (ref 3–16)
BASOPHILS ABSOLUTE: 0 K/UL (ref 0–0.2)
BASOPHILS RELATIVE PERCENT: 1.4 %
BUN BLDV-MCNC: 12 MG/DL (ref 7–20)
BUN BLDV-MCNC: 13 MG/DL (ref 7–20)
CALCIUM SERPL-MCNC: 9.3 MG/DL (ref 8.3–10.6)
CHLORIDE BLD-SCNC: 98 MMOL/L (ref 99–110)
CO2: 27 MMOL/L (ref 21–32)
CREAT SERPL-MCNC: 0.8 MG/DL (ref 0.6–1.2)
CREAT SERPL-MCNC: 0.8 MG/DL (ref 0.6–1.2)
EOSINOPHILS ABSOLUTE: 0 K/UL (ref 0–0.6)
EOSINOPHILS RELATIVE PERCENT: 0.9 %
GFR AFRICAN AMERICAN: >60
GFR AFRICAN AMERICAN: >60
GFR NON-AFRICAN AMERICAN: >60
GFR NON-AFRICAN AMERICAN: >60
GLUCOSE BLD-MCNC: 117 MG/DL (ref 70–99)
HCT VFR BLD CALC: 34.6 % (ref 36–48)
HEMOGLOBIN: 11.7 G/DL (ref 12–16)
LYMPHOCYTES ABSOLUTE: 0.5 K/UL (ref 1–5.1)
LYMPHOCYTES RELATIVE PERCENT: 33.2 %
MCH RBC QN AUTO: 28.2 PG (ref 26–34)
MCHC RBC AUTO-ENTMCNC: 33.8 G/DL (ref 31–36)
MCV RBC AUTO: 83.3 FL (ref 80–100)
MONOCYTES ABSOLUTE: 0.2 K/UL (ref 0–1.3)
MONOCYTES RELATIVE PERCENT: 10.5 %
NEUTROPHILS ABSOLUTE: 0.8 K/UL (ref 1.7–7.7)
NEUTROPHILS RELATIVE PERCENT: 54 %
PDW BLD-RTO: 16.8 % (ref 12.4–15.4)
PHOSPHORUS: 2.5 MG/DL (ref 2.5–4.9)
PLATELET # BLD: 319 K/UL (ref 135–450)
PMV BLD AUTO: 6.6 FL (ref 5–10.5)
POTASSIUM SERPL-SCNC: 4.5 MMOL/L (ref 3.5–5.1)
PRO-BNP: 797 PG/ML (ref 0–124)
RBC # BLD: 4.15 M/UL (ref 4–5.2)
SODIUM BLD-SCNC: 133 MMOL/L (ref 136–145)
WBC # BLD: 1.5 K/UL (ref 4–11)

## 2019-08-19 PROCEDURE — 82565 ASSAY OF CREATININE: CPT

## 2019-08-19 PROCEDURE — 36415 COLL VENOUS BLD VENIPUNCTURE: CPT

## 2019-08-19 PROCEDURE — 80069 RENAL FUNCTION PANEL: CPT

## 2019-08-19 PROCEDURE — 74177 CT ABD & PELVIS W/CONTRAST: CPT

## 2019-08-19 PROCEDURE — 83880 ASSAY OF NATRIURETIC PEPTIDE: CPT

## 2019-08-19 PROCEDURE — 84520 ASSAY OF UREA NITROGEN: CPT

## 2019-08-19 PROCEDURE — 6360000004 HC RX CONTRAST MEDICATION: Performed by: NURSE PRACTITIONER

## 2019-08-19 PROCEDURE — 85025 COMPLETE CBC W/AUTO DIFF WBC: CPT

## 2019-08-19 RX ADMIN — IOPAMIDOL 75 ML: 755 INJECTION, SOLUTION INTRAVENOUS at 15:05

## 2019-08-19 RX ADMIN — IOHEXOL 50 ML: 240 INJECTION, SOLUTION INTRATHECAL; INTRAVASCULAR; INTRAVENOUS; ORAL at 14:54

## 2019-08-19 NOTE — CARE COORDINATION
A user error has taken place: encounter opened in error, closed for administrative reasons. Had intended to contact for CC intro but did not call.

## 2019-08-20 NOTE — TELEPHONE ENCOUNTER
Nurse, Maurizio Oconnor at [facility] called back. States they see the labs and appreciate the information.

## 2019-08-20 NOTE — TELEPHONE ENCOUNTER
Spoke with staff at [facility]. They will contact Dr. Twila Ross and have him call.

## 2019-08-23 ENCOUNTER — TELEPHONE (OUTPATIENT)
Dept: CARDIOLOGY CLINIC | Age: 74
End: 2019-08-23

## 2019-08-25 DIAGNOSIS — F41.1 GENERALIZED ANXIETY DISORDER: ICD-10-CM

## 2019-08-29 ENCOUNTER — CARE COORDINATION (OUTPATIENT)
Dept: CARE COORDINATION | Age: 74
End: 2019-08-29

## 2019-08-29 NOTE — LETTER
8/29/2019    [address] [place] [address]      Dear Pranay Nolan,    My name is Bhumi Conte and I am a registered nurse who partners with Javier Broussard MD to improve patients' health. Javier Broussard MD believes you would benefit from working with me. As a member of your health care team, I would work with other providers involved in your care, offer education for your specific health conditions, and connect you with additional resources as needed. I will collaborate with Javier Broussard MD to support you in following your treatment plan. The additional support I provide is no additional cost to you. My primary focus is to help you achieve specific goals and improve your health. We are committed to walk with you on this journey and look forward to working with you. Please call me to further discuss your healthcare needs. I am available by phone or for appointments at the office. You can reach me at [phone].     In good health,         Bhumi IBRAHIM, RN  Ambulatory Care Manager

## 2019-09-16 ENCOUNTER — OFFICE VISIT (OUTPATIENT)
Dept: FAMILY MEDICINE CLINIC | Age: 74
End: 2019-09-16
Payer: MEDICARE

## 2019-09-16 VITALS
SYSTOLIC BLOOD PRESSURE: 106 MMHG | WEIGHT: 155 LBS | BODY MASS INDEX: 28.35 KG/M2 | HEART RATE: 94 BPM | TEMPERATURE: 98.8 F | DIASTOLIC BLOOD PRESSURE: 71 MMHG | OXYGEN SATURATION: 94 %

## 2019-09-16 DIAGNOSIS — Z17.1 MALIGNANT NEOPLASM OF UPPER-OUTER QUADRANT OF RIGHT BREAST IN FEMALE, ESTROGEN RECEPTOR NEGATIVE (HCC): Primary | ICD-10-CM

## 2019-09-16 DIAGNOSIS — Z23 NEED FOR INFLUENZA VACCINATION: ICD-10-CM

## 2019-09-16 DIAGNOSIS — C50.411 MALIGNANT NEOPLASM OF UPPER-OUTER QUADRANT OF RIGHT BREAST IN FEMALE, ESTROGEN RECEPTOR NEGATIVE (HCC): Primary | ICD-10-CM

## 2019-09-16 DIAGNOSIS — Z86.711 HISTORY OF PULMONARY EMBOLUS (PE): ICD-10-CM

## 2019-09-16 DIAGNOSIS — I10 ESSENTIAL HYPERTENSION: ICD-10-CM

## 2019-09-16 PROBLEM — J96.01 ACUTE RESPIRATORY FAILURE WITH HYPOXIA (HCC): Status: RESOLVED | Noted: 2019-06-13 | Resolved: 2019-09-16

## 2019-09-16 PROCEDURE — G8427 DOCREV CUR MEDS BY ELIG CLIN: HCPCS | Performed by: FAMILY MEDICINE

## 2019-09-16 PROCEDURE — 1123F ACP DISCUSS/DSCN MKR DOCD: CPT | Performed by: FAMILY MEDICINE

## 2019-09-16 PROCEDURE — G0008 ADMIN INFLUENZA VIRUS VAC: HCPCS | Performed by: FAMILY MEDICINE

## 2019-09-16 PROCEDURE — 1036F TOBACCO NON-USER: CPT | Performed by: FAMILY MEDICINE

## 2019-09-16 PROCEDURE — 1090F PRES/ABSN URINE INCON ASSESS: CPT | Performed by: FAMILY MEDICINE

## 2019-09-16 PROCEDURE — 99214 OFFICE O/P EST MOD 30 MIN: CPT | Performed by: FAMILY MEDICINE

## 2019-09-16 PROCEDURE — 4040F PNEUMOC VAC/ADMIN/RCVD: CPT | Performed by: FAMILY MEDICINE

## 2019-09-16 PROCEDURE — G8399 PT W/DXA RESULTS DOCUMENT: HCPCS | Performed by: FAMILY MEDICINE

## 2019-09-16 PROCEDURE — G8417 CALC BMI ABV UP PARAM F/U: HCPCS | Performed by: FAMILY MEDICINE

## 2019-09-16 PROCEDURE — 3017F COLORECTAL CA SCREEN DOC REV: CPT | Performed by: FAMILY MEDICINE

## 2019-09-16 PROCEDURE — 90653 IIV ADJUVANT VACCINE IM: CPT | Performed by: FAMILY MEDICINE

## 2019-09-16 NOTE — PATIENT INSTRUCTIONS
Please read the healthy family handout that you were given and share it with your family. Please compare this printed medication list with your medications at home to be sure they are the same. If you have any medications that are different please contact us immediately at [phone]. Also review your allergies that we have listed, these may also include medications that you have not been able to tolerate, make sure everything listed is correct. If you have any allergies that are different please contact us immediately at [phone].

## 2019-09-16 NOTE — PROGRESS NOTES
Vaccine Information Sheet, \"Influenza - Inactivated\"  given to Antoine Muniz, or parent/legal guardian of  Antoine Muniz and verbalized understanding. Patient responses:    Have you ever had a reaction to a flu vaccine? No  Are you able to eat eggs without adverse effects? Yes  Do you have any current illness? No  Have you ever had Guillian Lamoure Syndrome? No    Flu vaccine given per order. Please see immunization tab.

## 2019-09-20 ENCOUNTER — CARE COORDINATION (OUTPATIENT)
Dept: CARE COORDINATION | Age: 74
End: 2019-09-20

## 2019-10-29 ENCOUNTER — HOSPITAL ENCOUNTER (OUTPATIENT)
Age: 74
Discharge: HOME | End: 2019-10-29
Payer: MEDICARE

## 2019-10-29 ENCOUNTER — HOSPITAL ENCOUNTER (OUTPATIENT)
Dept: CT IMAGING | Age: 74
Discharge: HOME | End: 2019-10-29
Payer: MEDICARE

## 2019-10-29 DIAGNOSIS — C50.411 MALIGNANT NEOPLASM OF UPPER-OUTER QUADRANT OF RIGHT FEMALE BREAST, UNSPECIFIED ESTROGEN RECEPTOR STATUS (HCC): ICD-10-CM

## 2019-10-29 LAB
BUN BLDV-MCNC: 17 MG/DL (ref 7–20)
CREAT SERPL-MCNC: 0.8 MG/DL (ref 0.6–1.2)
GFR AFRICAN AMERICAN: >60
GFR NON-AFRICAN AMERICAN: >60

## 2019-10-29 PROCEDURE — 6360000004 HC RX CONTRAST MEDICATION: Performed by: INTERNAL MEDICINE

## 2019-10-29 PROCEDURE — 82565 ASSAY OF CREATININE: CPT

## 2019-10-29 PROCEDURE — 74177 CT ABD & PELVIS W/CONTRAST: CPT

## 2019-10-29 PROCEDURE — 84520 ASSAY OF UREA NITROGEN: CPT

## 2019-10-29 PROCEDURE — 36415 COLL VENOUS BLD VENIPUNCTURE: CPT

## 2019-10-29 RX ADMIN — IOPAMIDOL 75 ML: 755 INJECTION, SOLUTION INTRAVENOUS at 14:52

## 2019-10-29 RX ADMIN — IOHEXOL 50 ML: 240 INJECTION, SOLUTION INTRATHECAL; INTRAVASCULAR; INTRAVENOUS; ORAL at 14:53

## 2019-12-02 RX ORDER — OMEPRAZOLE 20 MG/1
CAPSULE, DELAYED RELEASE ORAL
Qty: 90 CAPSULE | Refills: 1 | Status: SHIPPED | OUTPATIENT
Start: 2019-12-02 | End: 2020-01-01

## 2020-01-01 ENCOUNTER — CARE COORDINATION (OUTPATIENT)
Dept: CASE MANAGEMENT | Age: 75
End: 2020-01-01

## 2020-01-01 ENCOUNTER — APPOINTMENT (OUTPATIENT)
Dept: CT IMAGING | Age: 75
End: 2020-01-01
Payer: MEDICARE

## 2020-01-01 ENCOUNTER — HOSPITAL ENCOUNTER (EMERGENCY)
Age: 75
Discharge: ANOTHER HOSPITAL | End: 2020-11-30
Attending: STUDENT IN AN ORGANIZED HEALTH CARE EDUCATION/TRAINING PROGRAM
Payer: MEDICARE

## 2020-01-01 ENCOUNTER — TELEPHONE (OUTPATIENT)
Dept: FAMILY MEDICINE CLINIC | Age: 75
End: 2020-01-01

## 2020-01-01 ENCOUNTER — HOSPITAL ENCOUNTER (INPATIENT)
Age: 75
LOS: 2 days | Discharge: HOME HEALTH | DRG: 054 | End: 2020-12-02
Attending: HOSPITALIST | Admitting: HOSPITALIST
Payer: MEDICARE

## 2020-01-01 ENCOUNTER — HOSPITAL ENCOUNTER (OUTPATIENT)
Dept: CT IMAGING | Age: 75
Discharge: HOME | End: 2020-07-16
Payer: MEDICARE

## 2020-01-01 ENCOUNTER — APPOINTMENT (OUTPATIENT)
Dept: GENERAL RADIOLOGY | Age: 75
End: 2020-01-01
Payer: MEDICARE

## 2020-01-01 ENCOUNTER — HOSPITAL ENCOUNTER (OUTPATIENT)
Dept: CT IMAGING | Age: 75
Discharge: HOME | End: 2020-10-13
Payer: MEDICARE

## 2020-01-01 ENCOUNTER — OFFICE VISIT (OUTPATIENT)
Dept: FAMILY MEDICINE CLINIC | Age: 75
End: 2020-01-01
Payer: MEDICARE

## 2020-01-01 ENCOUNTER — HOSPITAL ENCOUNTER (OUTPATIENT)
Dept: CT IMAGING | Age: 75
Discharge: HOME | End: 2020-06-09
Payer: MEDICARE

## 2020-01-01 ENCOUNTER — APPOINTMENT (OUTPATIENT)
Dept: MRI IMAGING | Age: 75
DRG: 054 | End: 2020-01-01
Attending: HOSPITALIST
Payer: MEDICARE

## 2020-01-01 ENCOUNTER — HOSPITAL ENCOUNTER (OUTPATIENT)
Dept: CT IMAGING | Age: 75
Discharge: HOME | End: 2020-02-03
Payer: MEDICARE

## 2020-01-01 VITALS
HEIGHT: 62 IN | OXYGEN SATURATION: 98 % | HEART RATE: 62 BPM | DIASTOLIC BLOOD PRESSURE: 84 MMHG | BODY MASS INDEX: 26.07 KG/M2 | TEMPERATURE: 98.1 F | RESPIRATION RATE: 16 BRPM | SYSTOLIC BLOOD PRESSURE: 135 MMHG | WEIGHT: 141.7 LBS

## 2020-01-01 VITALS
HEART RATE: 86 BPM | TEMPERATURE: 97.7 F | WEIGHT: 147 LBS | DIASTOLIC BLOOD PRESSURE: 58 MMHG | RESPIRATION RATE: 20 BRPM | SYSTOLIC BLOOD PRESSURE: 91 MMHG | BODY MASS INDEX: 27.05 KG/M2 | HEIGHT: 62 IN | OXYGEN SATURATION: 97 %

## 2020-01-01 VITALS
TEMPERATURE: 98 F | HEART RATE: 95 BPM | SYSTOLIC BLOOD PRESSURE: 124 MMHG | DIASTOLIC BLOOD PRESSURE: 84 MMHG | WEIGHT: 151 LBS | OXYGEN SATURATION: 96 % | BODY MASS INDEX: 27.62 KG/M2

## 2020-01-01 LAB
A/G RATIO: 0.9 (ref 1.1–2.2)
A/G RATIO: 1 (ref 1.1–2.2)
A/G RATIO: 1 (ref 1.1–2.2)
ALBUMIN SERPL-MCNC: 3 G/DL (ref 3.4–5)
ALBUMIN SERPL-MCNC: 3.1 G/DL (ref 3.4–5)
ALBUMIN SERPL-MCNC: 3.2 G/DL (ref 3.4–5)
ALP BLD-CCNC: 103 U/L (ref 40–129)
ALP BLD-CCNC: 86 U/L (ref 40–129)
ALP BLD-CCNC: 87 U/L (ref 40–129)
ALT SERPL-CCNC: 13 U/L (ref 10–40)
ALT SERPL-CCNC: 17 U/L (ref 10–40)
ALT SERPL-CCNC: 17 U/L (ref 10–40)
AMMONIA: 16 UMOL/L (ref 11–51)
ANION GAP SERPL CALCULATED.3IONS-SCNC: 10 MMOL/L (ref 3–16)
ANION GAP SERPL CALCULATED.3IONS-SCNC: 10 MMOL/L (ref 3–16)
ANION GAP SERPL CALCULATED.3IONS-SCNC: 13 MMOL/L (ref 3–16)
ANION GAP SERPL CALCULATED.3IONS-SCNC: 22 MMOL/L (ref 3–16)
AST SERPL-CCNC: 28 U/L (ref 15–37)
AST SERPL-CCNC: 33 U/L (ref 15–37)
AST SERPL-CCNC: 45 U/L (ref 15–37)
BACTERIA: ABNORMAL /HPF
BASE EXCESS VENOUS: -4 MMOL/L (ref -3–3)
BASE EXCESS VENOUS: -6 MMOL/L (ref -3–3)
BASE EXCESS VENOUS: -9.7 MMOL/L (ref -3–3)
BASOPHILS ABSOLUTE: 0 K/UL (ref 0–0.2)
BASOPHILS ABSOLUTE: 0.1 K/UL (ref 0–0.2)
BASOPHILS RELATIVE PERCENT: 0.1 %
BASOPHILS RELATIVE PERCENT: 0.2 %
BASOPHILS RELATIVE PERCENT: 0.3 %
BASOPHILS RELATIVE PERCENT: 0.3 %
BILIRUB SERPL-MCNC: 0.3 MG/DL (ref 0–1)
BILIRUB SERPL-MCNC: 0.3 MG/DL (ref 0–1)
BILIRUB SERPL-MCNC: 0.5 MG/DL (ref 0–1)
BILIRUBIN URINE: NEGATIVE
BLOOD CULTURE, ROUTINE: ABNORMAL
BLOOD, URINE: ABNORMAL
BUN BLDV-MCNC: 22 MG/DL (ref 7–20)
BUN BLDV-MCNC: 27 MG/DL (ref 7–20)
BUN BLDV-MCNC: 31 MG/DL (ref 7–20)
BUN BLDV-MCNC: 36 MG/DL (ref 7–20)
CALCIUM SERPL-MCNC: 7.5 MG/DL (ref 8.3–10.6)
CALCIUM SERPL-MCNC: 7.7 MG/DL (ref 8.3–10.6)
CALCIUM SERPL-MCNC: 7.9 MG/DL (ref 8.3–10.6)
CALCIUM SERPL-MCNC: 9 MG/DL (ref 8.3–10.6)
CARBOXYHEMOGLOBIN: 0.7 % (ref 0–1.5)
CARBOXYHEMOGLOBIN: 2.1 % (ref 0–1.5)
CARBOXYHEMOGLOBIN: 2.4 % (ref 0–1.5)
CHLORIDE BLD-SCNC: 102 MMOL/L (ref 99–110)
CHLORIDE BLD-SCNC: 103 MMOL/L (ref 99–110)
CHLORIDE BLD-SCNC: 94 MMOL/L (ref 99–110)
CHLORIDE BLD-SCNC: 95 MMOL/L (ref 99–110)
CLARITY: CLEAR
CO2: 17 MMOL/L (ref 21–32)
CO2: 20 MMOL/L (ref 21–32)
CO2: 21 MMOL/L (ref 21–32)
CO2: 21 MMOL/L (ref 21–32)
COLOR: YELLOW
CREAT SERPL-MCNC: 0.8 MG/DL (ref 0.6–1.2)
CREAT SERPL-MCNC: 0.8 MG/DL (ref 0.6–1.2)
CREAT SERPL-MCNC: 0.9 MG/DL (ref 0.6–1.2)
CREAT SERPL-MCNC: 1.2 MG/DL (ref 0.6–1.2)
CULTURE, BLOOD 2: NORMAL
D DIMER: 819 NG/ML DDU (ref 0–229)
EKG ATRIAL RATE: 103 BPM
EKG ATRIAL RATE: 105 BPM
EKG ATRIAL RATE: 115 BPM
EKG ATRIAL RATE: 206 BPM
EKG ATRIAL RATE: 207 BPM
EKG DIAGNOSIS: NORMAL
EKG P AXIS: 27 DEGREES
EKG P AXIS: 33 DEGREES
EKG P AXIS: 37 DEGREES
EKG P AXIS: 39 DEGREES
EKG P AXIS: 41 DEGREES
EKG P-R INTERVAL: 120 MS
EKG P-R INTERVAL: 120 MS
EKG P-R INTERVAL: 166 MS
EKG P-R INTERVAL: 170 MS
EKG P-R INTERVAL: 172 MS
EKG Q-T INTERVAL: 212 MS
EKG Q-T INTERVAL: 212 MS
EKG Q-T INTERVAL: 336 MS
EKG Q-T INTERVAL: 374 MS
EKG Q-T INTERVAL: 382 MS
EKG QRS DURATION: 74 MS
EKG QRS DURATION: 74 MS
EKG QRS DURATION: 78 MS
EKG QRS DURATION: 80 MS
EKG QRS DURATION: 84 MS
EKG QTC CALCULATION (BAZETT): 392 MS
EKG QTC CALCULATION (BAZETT): 393 MS
EKG QTC CALCULATION (BAZETT): 464 MS
EKG QTC CALCULATION (BAZETT): 489 MS
EKG QTC CALCULATION (BAZETT): 504 MS
EKG R AXIS: 10 DEGREES
EKG R AXIS: 26 DEGREES
EKG R AXIS: 33 DEGREES
EKG R AXIS: 37 DEGREES
EKG R AXIS: 41 DEGREES
EKG T AXIS: 185 DEGREES
EKG T AXIS: 19 DEGREES
EKG T AXIS: 195 DEGREES
EKG T AXIS: 24 DEGREES
EKG T AXIS: 51 DEGREES
EKG VENTRICULAR RATE: 103 BPM
EKG VENTRICULAR RATE: 105 BPM
EKG VENTRICULAR RATE: 115 BPM
EKG VENTRICULAR RATE: 206 BPM
EKG VENTRICULAR RATE: 207 BPM
EOSINOPHILS ABSOLUTE: 0 K/UL (ref 0–0.6)
EOSINOPHILS RELATIVE PERCENT: 0 %
EPITHELIAL CELLS, UA: ABNORMAL /HPF (ref 0–5)
GFR AFRICAN AMERICAN: 53
GFR AFRICAN AMERICAN: >60
GFR NON-AFRICAN AMERICAN: 44
GFR NON-AFRICAN AMERICAN: >60
GLOBULIN: 3.2 G/DL
GLOBULIN: 3.2 G/DL
GLOBULIN: 3.3 G/DL
GLUCOSE BLD-MCNC: 117 MG/DL (ref 70–99)
GLUCOSE BLD-MCNC: 137 MG/DL (ref 70–99)
GLUCOSE BLD-MCNC: 168 MG/DL (ref 70–99)
GLUCOSE BLD-MCNC: 183 MG/DL (ref 70–99)
GLUCOSE URINE: NEGATIVE MG/DL
HCO3 VENOUS: 16.6 MMOL/L (ref 23–29)
HCO3 VENOUS: 18.5 MMOL/L (ref 23–29)
HCO3 VENOUS: 20.3 MMOL/L (ref 23–29)
HCT VFR BLD CALC: 26 % (ref 36–48)
HCT VFR BLD CALC: 26.1 % (ref 36–48)
HCT VFR BLD CALC: 28.9 % (ref 36–48)
HCT VFR BLD CALC: 36.3 % (ref 36–48)
HEMOGLOBIN: 11.5 G/DL (ref 12–16)
HEMOGLOBIN: 8.4 G/DL (ref 12–16)
HEMOGLOBIN: 8.6 G/DL (ref 12–16)
HEMOGLOBIN: 9.5 G/DL (ref 12–16)
INR BLD: 1.22 (ref 0.86–1.14)
INR BLD: 1.57 (ref 0.86–1.14)
INR BLD: 1.77 (ref 0.86–1.14)
KETONES, URINE: NEGATIVE MG/DL
LACTIC ACID, SEPSIS: 1.1 MMOL/L (ref 0.4–1.9)
LACTIC ACID: 1 MMOL/L (ref 0.4–2)
LACTIC ACID: 5.7 MMOL/L (ref 0.4–2)
LEUKOCYTE ESTERASE, URINE: NEGATIVE
LIPASE: 13 U/L (ref 13–60)
LYMPHOCYTES ABSOLUTE: 0.4 K/UL (ref 1–5.1)
LYMPHOCYTES ABSOLUTE: 0.5 K/UL (ref 1–5.1)
LYMPHOCYTES ABSOLUTE: 0.5 K/UL (ref 1–5.1)
LYMPHOCYTES ABSOLUTE: 1.3 K/UL (ref 1–5.1)
LYMPHOCYTES RELATIVE PERCENT: 11.7 %
LYMPHOCYTES RELATIVE PERCENT: 13.2 %
LYMPHOCYTES RELATIVE PERCENT: 6.2 %
LYMPHOCYTES RELATIVE PERCENT: 6.7 %
MAGNESIUM: 1.4 MG/DL (ref 1.8–2.4)
MAGNESIUM: 1.9 MG/DL (ref 1.8–2.4)
MAGNESIUM: 2.1 MG/DL (ref 1.8–2.4)
MCH RBC QN AUTO: 24.4 PG (ref 26–34)
MCH RBC QN AUTO: 24.6 PG (ref 26–34)
MCH RBC QN AUTO: 24.7 PG (ref 26–34)
MCH RBC QN AUTO: 24.9 PG (ref 26–34)
MCHC RBC AUTO-ENTMCNC: 31.8 G/DL (ref 31–36)
MCHC RBC AUTO-ENTMCNC: 32.3 G/DL (ref 31–36)
MCHC RBC AUTO-ENTMCNC: 32.8 G/DL (ref 31–36)
MCHC RBC AUTO-ENTMCNC: 32.8 G/DL (ref 31–36)
MCV RBC AUTO: 75.1 FL (ref 80–100)
MCV RBC AUTO: 75.9 FL (ref 80–100)
MCV RBC AUTO: 76.4 FL (ref 80–100)
MCV RBC AUTO: 76.9 FL (ref 80–100)
METHEMOGLOBIN VENOUS: 0.2 %
METHEMOGLOBIN VENOUS: 0.3 %
METHEMOGLOBIN VENOUS: 0.7 %
MICROSCOPIC EXAMINATION: YES
MONOCYTES ABSOLUTE: 0 K/UL (ref 0–1.3)
MONOCYTES ABSOLUTE: 0.1 K/UL (ref 0–1.3)
MONOCYTES ABSOLUTE: 0.2 K/UL (ref 0–1.3)
MONOCYTES ABSOLUTE: 0.6 K/UL (ref 0–1.3)
MONOCYTES RELATIVE PERCENT: 1.2 %
MONOCYTES RELATIVE PERCENT: 2.4 %
MONOCYTES RELATIVE PERCENT: 2.8 %
MONOCYTES RELATIVE PERCENT: 4.1 %
NEUTROPHILS ABSOLUTE: 18.6 K/UL (ref 1.7–7.7)
NEUTROPHILS ABSOLUTE: 3 K/UL (ref 1.7–7.7)
NEUTROPHILS ABSOLUTE: 3.1 K/UL (ref 1.7–7.7)
NEUTROPHILS ABSOLUTE: 6.3 K/UL (ref 1.7–7.7)
NEUTROPHILS RELATIVE PERCENT: 82.6 %
NEUTROPHILS RELATIVE PERCENT: 86.8 %
NEUTROPHILS RELATIVE PERCENT: 90.7 %
NEUTROPHILS RELATIVE PERCENT: 90.7 %
NITRITE, URINE: NEGATIVE
O2 CONTENT, VEN: 11 VOL %
O2 CONTENT, VEN: 4 VOL %
O2 CONTENT, VEN: 6 VOL %
O2 SAT, VEN: 26 %
O2 SAT, VEN: 40 %
O2 SAT, VEN: 79 %
O2 THERAPY: ABNORMAL
ORGANISM: ABNORMAL
PCO2, VEN: 32.7 MMHG (ref 40–50)
PCO2, VEN: 34.3 MMHG (ref 40–50)
PCO2, VEN: 38 MMHG (ref 40–50)
PDW BLD-RTO: 20.2 % (ref 12.4–15.4)
PDW BLD-RTO: 20.2 % (ref 12.4–15.4)
PDW BLD-RTO: 20.6 % (ref 12.4–15.4)
PDW BLD-RTO: 20.7 % (ref 12.4–15.4)
PH UA: 5.5 (ref 5–8)
PH VENOUS: 7.26 (ref 7.35–7.45)
PH VENOUS: 7.37 (ref 7.35–7.45)
PH VENOUS: 7.39 (ref 7.35–7.45)
PHOSPHORUS: 1.7 MG/DL (ref 2.5–4.9)
PHOSPHORUS: 2.4 MG/DL (ref 2.5–4.9)
PHOSPHORUS: 3.7 MG/DL (ref 2.5–4.9)
PLATELET # BLD: 201 K/UL (ref 135–450)
PLATELET # BLD: 219 K/UL (ref 135–450)
PLATELET # BLD: 220 K/UL (ref 135–450)
PLATELET # BLD: 360 K/UL (ref 135–450)
PMV BLD AUTO: 6.8 FL (ref 5–10.5)
PMV BLD AUTO: 6.9 FL (ref 5–10.5)
PMV BLD AUTO: 7.1 FL (ref 5–10.5)
PMV BLD AUTO: 7.6 FL (ref 5–10.5)
PO2, VEN: 19.7 MMHG (ref 25–40)
PO2, VEN: 23.7 MMHG (ref 25–40)
PO2, VEN: 44.1 MMHG (ref 25–40)
POTASSIUM SERPL-SCNC: 3.2 MMOL/L (ref 3.5–5.1)
POTASSIUM SERPL-SCNC: 3.6 MMOL/L (ref 3.5–5.1)
POTASSIUM SERPL-SCNC: 4.1 MMOL/L (ref 3.5–5.1)
POTASSIUM SERPL-SCNC: 4.1 MMOL/L (ref 3.5–5.1)
POTASSIUM SERPL-SCNC: 4.2 MMOL/L (ref 3.5–5.1)
PRO-BNP: 2343 PG/ML (ref 0–449)
PROCALCITONIN: 0.8 NG/ML (ref 0–0.15)
PROTEIN UA: 30 MG/DL
PROTHROMBIN TIME: 14.2 SEC (ref 10–13.2)
PROTHROMBIN TIME: 18.3 SEC (ref 10–13.2)
PROTHROMBIN TIME: 20.6 SEC (ref 10–13.2)
RBC # BLD: 3.4 M/UL (ref 4–5.2)
RBC # BLD: 3.44 M/UL (ref 4–5.2)
RBC # BLD: 3.85 M/UL (ref 4–5.2)
RBC # BLD: 4.72 M/UL (ref 4–5.2)
RBC UA: ABNORMAL /HPF (ref 0–4)
SODIUM BLD-SCNC: 128 MMOL/L (ref 136–145)
SODIUM BLD-SCNC: 132 MMOL/L (ref 136–145)
SODIUM BLD-SCNC: 134 MMOL/L (ref 136–145)
SODIUM BLD-SCNC: 134 MMOL/L (ref 136–145)
SPECIFIC GRAVITY UA: 1.02 (ref 1–1.03)
T4 FREE: 1.5 NG/DL (ref 0.9–1.8)
TCO2 CALC VENOUS: 18 MMOL/L
TCO2 CALC VENOUS: 20 MMOL/L
TCO2 CALC VENOUS: 21 MMOL/L
TOTAL CK: 1022 U/L (ref 26–192)
TOTAL PROTEIN: 6.2 G/DL (ref 6.4–8.2)
TOTAL PROTEIN: 6.3 G/DL (ref 6.4–8.2)
TOTAL PROTEIN: 6.5 G/DL (ref 6.4–8.2)
TROPONIN: 0.07 NG/ML
TROPONIN: 0.1 NG/ML
TSH REFLEX: 5.05 UIU/ML (ref 0.27–4.2)
URINE CULTURE, ROUTINE: NORMAL
URINE TYPE: ABNORMAL
UROBILINOGEN, URINE: 0.2 E.U./DL
WBC # BLD: 20.5 K/UL (ref 4–11)
WBC # BLD: 3.6 K/UL (ref 4–11)
WBC # BLD: 3.6 K/UL (ref 4–11)
WBC # BLD: 6.9 K/UL (ref 4–11)
WBC UA: ABNORMAL /HPF (ref 0–5)

## 2020-01-01 PROCEDURE — 96365 THER/PROPH/DIAG IV INF INIT: CPT

## 2020-01-01 PROCEDURE — 95816 EEG AWAKE AND DROWSY: CPT

## 2020-01-01 PROCEDURE — 72125 CT NECK SPINE W/O DYE: CPT

## 2020-01-01 PROCEDURE — A9579 GAD-BASE MR CONTRAST NOS,1ML: HCPCS | Performed by: INTERNAL MEDICINE

## 2020-01-01 PROCEDURE — 99285 EMERGENCY DEPT VISIT HI MDM: CPT

## 2020-01-01 PROCEDURE — 6360000004 HC RX CONTRAST MEDICATION: Performed by: NURSE PRACTITIONER

## 2020-01-01 PROCEDURE — 6360000004 HC RX CONTRAST MEDICATION: Performed by: INTERNAL MEDICINE

## 2020-01-01 PROCEDURE — 73560 X-RAY EXAM OF KNEE 1 OR 2: CPT

## 2020-01-01 PROCEDURE — 85610 PROTHROMBIN TIME: CPT

## 2020-01-01 PROCEDURE — 2000000000 HC ICU R&B

## 2020-01-01 PROCEDURE — 2500000003 HC RX 250 WO HCPCS: Performed by: STUDENT IN AN ORGANIZED HEALTH CARE EDUCATION/TRAINING PROGRAM

## 2020-01-01 PROCEDURE — 99214 OFFICE O/P EST MOD 30 MIN: CPT | Performed by: FAMILY MEDICINE

## 2020-01-01 PROCEDURE — 93010 ELECTROCARDIOGRAM REPORT: CPT | Performed by: INTERNAL MEDICINE

## 2020-01-01 PROCEDURE — 6370000000 HC RX 637 (ALT 250 FOR IP): Performed by: INTERNAL MEDICINE

## 2020-01-01 PROCEDURE — 83605 ASSAY OF LACTIC ACID: CPT

## 2020-01-01 PROCEDURE — 93005 ELECTROCARDIOGRAM TRACING: CPT | Performed by: STUDENT IN AN ORGANIZED HEALTH CARE EDUCATION/TRAINING PROGRAM

## 2020-01-01 PROCEDURE — 1036F TOBACCO NON-USER: CPT | Performed by: FAMILY MEDICINE

## 2020-01-01 PROCEDURE — 96376 TX/PRO/DX INJ SAME DRUG ADON: CPT

## 2020-01-01 PROCEDURE — 71260 CT THORAX DX C+: CPT

## 2020-01-01 PROCEDURE — 82140 ASSAY OF AMMONIA: CPT

## 2020-01-01 PROCEDURE — 99223 1ST HOSP IP/OBS HIGH 75: CPT | Performed by: PSYCHIATRY & NEUROLOGY

## 2020-01-01 PROCEDURE — 4040F PNEUMOC VAC/ADMIN/RCVD: CPT | Performed by: FAMILY MEDICINE

## 2020-01-01 PROCEDURE — 6360000002 HC RX W HCPCS: Performed by: STUDENT IN AN ORGANIZED HEALTH CARE EDUCATION/TRAINING PROGRAM

## 2020-01-01 PROCEDURE — 6360000002 HC RX W HCPCS: Performed by: INTERNAL MEDICINE

## 2020-01-01 PROCEDURE — 84439 ASSAY OF FREE THYROXINE: CPT

## 2020-01-01 PROCEDURE — 84100 ASSAY OF PHOSPHORUS: CPT

## 2020-01-01 PROCEDURE — 84443 ASSAY THYROID STIM HORMONE: CPT

## 2020-01-01 PROCEDURE — 70450 CT HEAD/BRAIN W/O DYE: CPT

## 2020-01-01 PROCEDURE — 6370000000 HC RX 637 (ALT 250 FOR IP): Performed by: PSYCHIATRY & NEUROLOGY

## 2020-01-01 PROCEDURE — 70553 MRI BRAIN STEM W/O & W/DYE: CPT

## 2020-01-01 PROCEDURE — G8399 PT W/DXA RESULTS DOCUMENT: HCPCS | Performed by: FAMILY MEDICINE

## 2020-01-01 PROCEDURE — 2060000000 HC ICU INTERMEDIATE R&B

## 2020-01-01 PROCEDURE — 82803 BLOOD GASES ANY COMBINATION: CPT

## 2020-01-01 PROCEDURE — 83690 ASSAY OF LIPASE: CPT

## 2020-01-01 PROCEDURE — 80053 COMPREHEN METABOLIC PANEL: CPT

## 2020-01-01 PROCEDURE — 83735 ASSAY OF MAGNESIUM: CPT

## 2020-01-01 PROCEDURE — 84484 ASSAY OF TROPONIN QUANT: CPT

## 2020-01-01 PROCEDURE — G8417 CALC BMI ABV UP PARAM F/U: HCPCS | Performed by: FAMILY MEDICINE

## 2020-01-01 PROCEDURE — 85025 COMPLETE CBC W/AUTO DIFF WBC: CPT

## 2020-01-01 PROCEDURE — 2580000003 HC RX 258: Performed by: INTERNAL MEDICINE

## 2020-01-01 PROCEDURE — 36415 COLL VENOUS BLD VENIPUNCTURE: CPT

## 2020-01-01 PROCEDURE — 87077 CULTURE AEROBIC IDENTIFY: CPT

## 2020-01-01 PROCEDURE — 74177 CT ABD & PELVIS W/CONTRAST: CPT

## 2020-01-01 PROCEDURE — 99233 SBSQ HOSP IP/OBS HIGH 50: CPT | Performed by: PSYCHIATRY & NEUROLOGY

## 2020-01-01 PROCEDURE — 83880 ASSAY OF NATRIURETIC PEPTIDE: CPT

## 2020-01-01 PROCEDURE — 6370000000 HC RX 637 (ALT 250 FOR IP): Performed by: STUDENT IN AN ORGANIZED HEALTH CARE EDUCATION/TRAINING PROGRAM

## 2020-01-01 PROCEDURE — G8427 DOCREV CUR MEDS BY ELIG CLIN: HCPCS | Performed by: FAMILY MEDICINE

## 2020-01-01 PROCEDURE — 1090F PRES/ABSN URINE INCON ASSESS: CPT | Performed by: FAMILY MEDICINE

## 2020-01-01 PROCEDURE — 1123F ACP DISCUSS/DSCN MKR DOCD: CPT | Performed by: FAMILY MEDICINE

## 2020-01-01 PROCEDURE — 3017F COLORECTAL CA SCREEN DOC REV: CPT | Performed by: FAMILY MEDICINE

## 2020-01-01 PROCEDURE — 2580000003 HC RX 258: Performed by: STUDENT IN AN ORGANIZED HEALTH CARE EDUCATION/TRAINING PROGRAM

## 2020-01-01 PROCEDURE — 99232 SBSQ HOSP IP/OBS MODERATE 35: CPT | Performed by: PSYCHIATRY & NEUROLOGY

## 2020-01-01 PROCEDURE — 85379 FIBRIN DEGRADATION QUANT: CPT

## 2020-01-01 PROCEDURE — 6360000002 HC RX W HCPCS

## 2020-01-01 PROCEDURE — 1111F DSCHRG MED/CURRENT MED MERGE: CPT | Performed by: FAMILY MEDICINE

## 2020-01-01 PROCEDURE — 96366 THER/PROPH/DIAG IV INF ADDON: CPT

## 2020-01-01 PROCEDURE — 87086 URINE CULTURE/COLONY COUNT: CPT

## 2020-01-01 PROCEDURE — 96375 TX/PRO/DX INJ NEW DRUG ADDON: CPT

## 2020-01-01 PROCEDURE — 2700000000 HC OXYGEN THERAPY PER DAY

## 2020-01-01 PROCEDURE — 80048 BASIC METABOLIC PNL TOTAL CA: CPT

## 2020-01-01 PROCEDURE — 94761 N-INVAS EAR/PLS OXIMETRY MLT: CPT

## 2020-01-01 PROCEDURE — 84145 PROCALCITONIN (PCT): CPT

## 2020-01-01 PROCEDURE — 99223 1ST HOSP IP/OBS HIGH 75: CPT | Performed by: INTERNAL MEDICINE

## 2020-01-01 PROCEDURE — 84132 ASSAY OF SERUM POTASSIUM: CPT

## 2020-01-01 PROCEDURE — 6360000004 HC RX CONTRAST MEDICATION: Performed by: STUDENT IN AN ORGANIZED HEALTH CARE EDUCATION/TRAINING PROGRAM

## 2020-01-01 PROCEDURE — 96368 THER/DIAG CONCURRENT INF: CPT

## 2020-01-01 PROCEDURE — 81001 URINALYSIS AUTO W/SCOPE: CPT

## 2020-01-01 PROCEDURE — 71045 X-RAY EXAM CHEST 1 VIEW: CPT

## 2020-01-01 PROCEDURE — 87040 BLOOD CULTURE FOR BACTERIA: CPT

## 2020-01-01 PROCEDURE — 72170 X-RAY EXAM OF PELVIS: CPT

## 2020-01-01 PROCEDURE — 82550 ASSAY OF CK (CPK): CPT

## 2020-01-01 PROCEDURE — 95816 EEG AWAKE AND DROWSY: CPT | Performed by: PSYCHIATRY & NEUROLOGY

## 2020-01-01 RX ORDER — MAGNESIUM SULFATE 1 G/100ML
1 INJECTION INTRAVENOUS PRN
Status: DISCONTINUED | OUTPATIENT
Start: 2020-01-01 | End: 2020-01-01 | Stop reason: HOSPADM

## 2020-01-01 RX ORDER — 0.9 % SODIUM CHLORIDE 0.9 %
1000 INTRAVENOUS SOLUTION INTRAVENOUS ONCE
Status: COMPLETED | OUTPATIENT
Start: 2020-01-01 | End: 2020-01-01

## 2020-01-01 RX ORDER — SENNA AND DOCUSATE SODIUM 50; 8.6 MG/1; MG/1
2 TABLET, FILM COATED ORAL DAILY
Status: DISCONTINUED | OUTPATIENT
Start: 2020-01-01 | End: 2020-01-01 | Stop reason: HOSPADM

## 2020-01-01 RX ORDER — 0.9 % SODIUM CHLORIDE 0.9 %
1000 INTRAVENOUS SOLUTION INTRAVENOUS ONCE
Status: DISCONTINUED | OUTPATIENT
Start: 2020-01-01 | End: 2020-01-01 | Stop reason: HOSPADM

## 2020-01-01 RX ORDER — OMEPRAZOLE 20 MG/1
CAPSULE, DELAYED RELEASE ORAL
Qty: 90 CAPSULE | Refills: 1 | Status: SHIPPED | OUTPATIENT
Start: 2020-01-01

## 2020-01-01 RX ORDER — SODIUM CHLORIDE 0.9 % (FLUSH) 0.9 %
10 SYRINGE (ML) INJECTION PRN
Status: DISCONTINUED | OUTPATIENT
Start: 2020-01-01 | End: 2020-01-01 | Stop reason: HOSPADM

## 2020-01-01 RX ORDER — METOPROLOL SUCCINATE 25 MG/1
25 TABLET, EXTENDED RELEASE ORAL DAILY
Qty: 30 TABLET | Refills: 0 | Status: SHIPPED | OUTPATIENT
Start: 2020-01-01 | End: 2021-01-01

## 2020-01-01 RX ORDER — ACETAMINOPHEN 325 MG/1
650 TABLET ORAL EVERY 4 HOURS PRN
Status: DISCONTINUED | OUTPATIENT
Start: 2020-01-01 | End: 2020-01-01 | Stop reason: HOSPADM

## 2020-01-01 RX ORDER — ADENOSINE 3 MG/ML
6 INJECTION, SOLUTION INTRAVENOUS ONCE
Status: COMPLETED | OUTPATIENT
Start: 2020-01-01 | End: 2020-01-01

## 2020-01-01 RX ORDER — DILTIAZEM HYDROCHLORIDE 5 MG/ML
INJECTION INTRAVENOUS
Status: DISCONTINUED
Start: 2020-01-01 | End: 2020-01-01 | Stop reason: HOSPADM

## 2020-01-01 RX ORDER — ACETAMINOPHEN 650 MG/1
650 SUPPOSITORY RECTAL EVERY 4 HOURS PRN
Status: DISCONTINUED | OUTPATIENT
Start: 2020-01-01 | End: 2020-01-01 | Stop reason: HOSPADM

## 2020-01-01 RX ORDER — LOSARTAN POTASSIUM AND HYDROCHLOROTHIAZIDE 12.5; 5 MG/1; MG/1
TABLET ORAL
Qty: 90 TABLET | Refills: 1 | Status: SHIPPED | OUTPATIENT
Start: 2020-01-01 | End: 2021-01-01

## 2020-01-01 RX ORDER — ASPIRIN 325 MG
325 TABLET ORAL ONCE
Status: COMPLETED | OUTPATIENT
Start: 2020-01-01 | End: 2020-01-01

## 2020-01-01 RX ORDER — POTASSIUM CHLORIDE 7.45 MG/ML
10 INJECTION INTRAVENOUS PRN
Status: DISCONTINUED | OUTPATIENT
Start: 2020-01-01 | End: 2020-01-01 | Stop reason: HOSPADM

## 2020-01-01 RX ORDER — RIVAROXABAN 20 MG/1
TABLET, FILM COATED ORAL
Qty: 90 TABLET | Refills: 1 | Status: SHIPPED | OUTPATIENT
Start: 2020-01-01

## 2020-01-01 RX ORDER — ONDANSETRON 2 MG/ML
4 INJECTION INTRAMUSCULAR; INTRAVENOUS EVERY 6 HOURS PRN
Status: DISCONTINUED | OUTPATIENT
Start: 2020-01-01 | End: 2020-01-01 | Stop reason: HOSPADM

## 2020-01-01 RX ORDER — SODIUM CHLORIDE 9 MG/ML
INJECTION, SOLUTION INTRAVENOUS
Status: DISCONTINUED
Start: 2020-01-01 | End: 2020-01-01 | Stop reason: HOSPADM

## 2020-01-01 RX ORDER — LEVETIRACETAM 500 MG/1
500 TABLET ORAL 2 TIMES DAILY
Status: DISCONTINUED | OUTPATIENT
Start: 2020-01-01 | End: 2020-01-01 | Stop reason: HOSPADM

## 2020-01-01 RX ORDER — FENTANYL 100 UG/H
1 PATCH TRANSDERMAL
Status: DISCONTINUED | OUTPATIENT
Start: 2020-01-01 | End: 2020-01-01

## 2020-01-01 RX ORDER — SERTRALINE HYDROCHLORIDE 100 MG/1
100 TABLET, FILM COATED ORAL DAILY
Qty: 30 TABLET | Refills: 5 | Status: SHIPPED | OUTPATIENT
Start: 2020-01-01

## 2020-01-01 RX ORDER — PANTOPRAZOLE SODIUM 40 MG/1
40 TABLET, DELAYED RELEASE ORAL
Status: DISCONTINUED | OUTPATIENT
Start: 2020-01-01 | End: 2020-01-01 | Stop reason: HOSPADM

## 2020-01-01 RX ORDER — DEXAMETHASONE SODIUM PHOSPHATE 4 MG/ML
4 INJECTION, SOLUTION INTRA-ARTICULAR; INTRALESIONAL; INTRAMUSCULAR; INTRAVENOUS; SOFT TISSUE EVERY 6 HOURS
Status: DISCONTINUED | OUTPATIENT
Start: 2020-01-01 | End: 2020-01-01 | Stop reason: HOSPADM

## 2020-01-01 RX ORDER — ADENOSINE 3 MG/ML
INJECTION, SOLUTION INTRAVENOUS
Status: COMPLETED
Start: 2020-01-01 | End: 2020-01-01

## 2020-01-01 RX ORDER — OMEPRAZOLE 20 MG/1
CAPSULE, DELAYED RELEASE ORAL
Qty: 90 CAPSULE | Refills: 1 | Status: SHIPPED | OUTPATIENT
Start: 2020-01-01 | End: 2020-01-01

## 2020-01-01 RX ORDER — LEVETIRACETAM 500 MG/1
500 TABLET ORAL 2 TIMES DAILY
Qty: 60 TABLET | Refills: 0 | Status: SHIPPED | OUTPATIENT
Start: 2020-01-01 | End: 2021-01-01

## 2020-01-01 RX ORDER — DEXAMETHASONE 4 MG/1
4 TABLET ORAL
Qty: 10 TABLET | Refills: 0 | Status: SHIPPED | OUTPATIENT
Start: 2020-01-01 | End: 2020-01-01

## 2020-01-01 RX ORDER — METOPROLOL SUCCINATE 25 MG/1
25 TABLET, EXTENDED RELEASE ORAL DAILY
Status: DISCONTINUED | OUTPATIENT
Start: 2020-01-01 | End: 2020-01-01 | Stop reason: HOSPADM

## 2020-01-01 RX ORDER — OXYCODONE HYDROCHLORIDE AND ACETAMINOPHEN 5; 325 MG/1; MG/1
1 TABLET ORAL EVERY 4 HOURS PRN
Status: DISCONTINUED | OUTPATIENT
Start: 2020-01-01 | End: 2020-01-01 | Stop reason: HOSPADM

## 2020-01-01 RX ADMIN — DEXAMETHASONE SODIUM PHOSPHATE 4 MG: 4 INJECTION, SOLUTION INTRAMUSCULAR; INTRAVENOUS at 07:34

## 2020-01-01 RX ADMIN — DEXAMETHASONE SODIUM PHOSPHATE 4 MG: 4 INJECTION, SOLUTION INTRAMUSCULAR; INTRAVENOUS at 14:17

## 2020-01-01 RX ADMIN — POTASSIUM CHLORIDE 10 MEQ: 10 INJECTION, SOLUTION INTRAVENOUS at 07:28

## 2020-01-01 RX ADMIN — POTASSIUM CHLORIDE 10 MEQ: 10 INJECTION, SOLUTION INTRAVENOUS at 06:13

## 2020-01-01 RX ADMIN — PANTOPRAZOLE SODIUM 40 MG: 40 TABLET, DELAYED RELEASE ORAL at 07:34

## 2020-01-01 RX ADMIN — PIPERACILLIN SODIUM AND TAZOBACTAM SODIUM 4.5 G: 4; .5 INJECTION, POWDER, LYOPHILIZED, FOR SOLUTION INTRAVENOUS at 17:47

## 2020-01-01 RX ADMIN — IOPAMIDOL 75 ML: 755 INJECTION, SOLUTION INTRAVENOUS at 09:08

## 2020-01-01 RX ADMIN — IOHEXOL 50 ML: 240 INJECTION, SOLUTION INTRATHECAL; INTRAVASCULAR; INTRAVENOUS; ORAL at 12:23

## 2020-01-01 RX ADMIN — DEXAMETHASONE SODIUM PHOSPHATE 4 MG: 4 INJECTION, SOLUTION INTRAMUSCULAR; INTRAVENOUS at 18:58

## 2020-01-01 RX ADMIN — Medication 10 ML: at 20:11

## 2020-01-01 RX ADMIN — METOPROLOL TARTRATE 25 MG: 25 TABLET, FILM COATED ORAL at 19:48

## 2020-01-01 RX ADMIN — IOPAMIDOL 75 ML: 755 INJECTION, SOLUTION INTRAVENOUS at 11:48

## 2020-01-01 RX ADMIN — DEXAMETHASONE SODIUM PHOSPHATE 4 MG: 4 INJECTION, SOLUTION INTRAMUSCULAR; INTRAVENOUS at 14:03

## 2020-01-01 RX ADMIN — ASPIRIN 325 MG: 325 TABLET, FILM COATED ORAL at 18:24

## 2020-01-01 RX ADMIN — SERTRALINE HYDROCHLORIDE 100 MG: 50 TABLET ORAL at 08:55

## 2020-01-01 RX ADMIN — DOCUSATE SODIUM 50MG AND SENNOSIDES 8.6MG 2 TABLET: 8.6; 5 TABLET, FILM COATED ORAL at 09:02

## 2020-01-01 RX ADMIN — POTASSIUM CHLORIDE 10 MEQ: 10 INJECTION, SOLUTION INTRAVENOUS at 09:57

## 2020-01-01 RX ADMIN — ADENOSINE 6 MG: 3 INJECTION, SOLUTION INTRAVENOUS at 16:18

## 2020-01-01 RX ADMIN — Medication 5 MG/HR: at 18:24

## 2020-01-01 RX ADMIN — IOPAMIDOL 75 ML: 755 INJECTION, SOLUTION INTRAVENOUS at 13:40

## 2020-01-01 RX ADMIN — VANCOMYCIN HYDROCHLORIDE 1750 MG: 1 INJECTION, POWDER, LYOPHILIZED, FOR SOLUTION INTRAVENOUS at 17:45

## 2020-01-01 RX ADMIN — DEXAMETHASONE SODIUM PHOSPHATE 4 MG: 4 INJECTION, SOLUTION INTRAMUSCULAR; INTRAVENOUS at 00:06

## 2020-01-01 RX ADMIN — RIVAROXABAN 20 MG: 20 TABLET, FILM COATED ORAL at 08:11

## 2020-01-01 RX ADMIN — MUPIROCIN: 20 OINTMENT TOPICAL at 19:51

## 2020-01-01 RX ADMIN — DEXAMETHASONE SODIUM PHOSPHATE 4 MG: 4 INJECTION, SOLUTION INTRAMUSCULAR; INTRAVENOUS at 06:32

## 2020-01-01 RX ADMIN — PANTOPRAZOLE SODIUM 40 MG: 40 TABLET, DELAYED RELEASE ORAL at 06:19

## 2020-01-01 RX ADMIN — IOPAMIDOL 75 ML: 755 INJECTION, SOLUTION INTRAVENOUS at 17:08

## 2020-01-01 RX ADMIN — Medication 10 ML: at 19:49

## 2020-01-01 RX ADMIN — MUPIROCIN: 20 OINTMENT TOPICAL at 08:55

## 2020-01-01 RX ADMIN — DEXAMETHASONE SODIUM PHOSPHATE 4 MG: 4 INJECTION, SOLUTION INTRAMUSCULAR; INTRAVENOUS at 01:43

## 2020-01-01 RX ADMIN — OXYCODONE HYDROCHLORIDE AND ACETAMINOPHEN 1 TABLET: 5; 325 TABLET ORAL at 19:48

## 2020-01-01 RX ADMIN — RIVAROXABAN 20 MG: 20 TABLET, FILM COATED ORAL at 08:55

## 2020-01-01 RX ADMIN — DOCUSATE SODIUM 50MG AND SENNOSIDES 8.6MG 2 TABLET: 8.6; 5 TABLET, FILM COATED ORAL at 08:11

## 2020-01-01 RX ADMIN — RIVAROXABAN 20 MG: 20 TABLET, FILM COATED ORAL at 09:02

## 2020-01-01 RX ADMIN — Medication 10 ML: at 08:11

## 2020-01-01 RX ADMIN — METOPROLOL SUCCINATE 25 MG: 25 TABLET, EXTENDED RELEASE ORAL at 08:11

## 2020-01-01 RX ADMIN — SODIUM CHLORIDE 1000 ML: 9 INJECTION, SOLUTION INTRAVENOUS at 16:08

## 2020-01-01 RX ADMIN — MUPIROCIN: 20 OINTMENT TOPICAL at 09:03

## 2020-01-01 RX ADMIN — LEVETIRACETAM 500 MG: 500 TABLET ORAL at 08:11

## 2020-01-01 RX ADMIN — GADOTERIDOL 13 ML: 279.3 INJECTION, SOLUTION INTRAVENOUS at 08:24

## 2020-01-01 RX ADMIN — IOHEXOL 50 ML: 240 INJECTION, SOLUTION INTRATHECAL; INTRAVASCULAR; INTRAVENOUS; ORAL at 15:20

## 2020-01-01 RX ADMIN — ADENOSINE 6 MG: 3 INJECTION, SOLUTION INTRAVENOUS at 17:09

## 2020-01-01 RX ADMIN — METOPROLOL SUCCINATE 25 MG: 25 TABLET, EXTENDED RELEASE ORAL at 09:01

## 2020-01-01 RX ADMIN — MAGNESIUM SULFATE HEPTAHYDRATE 1 G: 1 INJECTION, SOLUTION INTRAVENOUS at 04:59

## 2020-01-01 RX ADMIN — DEXAMETHASONE SODIUM PHOSPHATE 4 MG: 4 INJECTION, SOLUTION INTRAMUSCULAR; INTRAVENOUS at 19:27

## 2020-01-01 RX ADMIN — POTASSIUM CHLORIDE 10 MEQ: 10 INJECTION, SOLUTION INTRAVENOUS at 04:59

## 2020-01-01 RX ADMIN — SERTRALINE HYDROCHLORIDE 100 MG: 50 TABLET ORAL at 09:02

## 2020-01-01 RX ADMIN — SERTRALINE HYDROCHLORIDE 100 MG: 50 TABLET ORAL at 14:17

## 2020-01-01 RX ADMIN — ADENOSINE 6 MG: 3 INJECTION, SOLUTION INTRAVENOUS at 17:42

## 2020-01-01 RX ADMIN — MAGNESIUM SULFATE HEPTAHYDRATE 1 G: 1 INJECTION, SOLUTION INTRAVENOUS at 06:23

## 2020-01-01 RX ADMIN — IOPAMIDOL 75 ML: 755 INJECTION, SOLUTION INTRAVENOUS at 15:21

## 2020-01-01 RX ADMIN — METOPROLOL TARTRATE 25 MG: 25 TABLET, FILM COATED ORAL at 08:55

## 2020-01-01 RX ADMIN — Medication 10 ML: at 09:02

## 2020-01-01 RX ADMIN — PANTOPRAZOLE SODIUM 40 MG: 40 TABLET, DELAYED RELEASE ORAL at 06:32

## 2020-01-01 RX ADMIN — LEVETIRACETAM 500 MG: 500 TABLET ORAL at 20:11

## 2020-01-01 ASSESSMENT — PAIN SCALES - GENERAL
PAINLEVEL_OUTOF10: 0
PAINLEVEL_OUTOF10: 0
PAINLEVEL_OUTOF10: 3
PAINLEVEL_OUTOF10: 0
PAINLEVEL_OUTOF10: 5
PAINLEVEL_OUTOF10: 0
PAINLEVEL_OUTOF10: 0

## 2020-01-01 ASSESSMENT — PATIENT HEALTH QUESTIONNAIRE - PHQ9
SUM OF ALL RESPONSES TO PHQ QUESTIONS 1-9: 0
1. LITTLE INTEREST OR PLEASURE IN DOING THINGS: 0
SUM OF ALL RESPONSES TO PHQ9 QUESTIONS 1 & 2: 0
2. FEELING DOWN, DEPRESSED OR HOPELESS: 0
SUM OF ALL RESPONSES TO PHQ QUESTIONS 1-9: 0

## 2020-01-01 ASSESSMENT — ENCOUNTER SYMPTOMS: TACHYPNEA: 1

## 2020-01-07 RX ORDER — RIVAROXABAN 20 MG/1
TABLET, FILM COATED ORAL
Qty: 30 TABLET | Refills: 5 | Status: SHIPPED | OUTPATIENT
Start: 2020-01-07 | End: 2020-01-01

## 2020-06-22 NOTE — PATIENT INSTRUCTIONS
Please read the healthy family handout that you were given and share it with your family. Please compare this printed medication list with your medications at home to be sure they are the same. If you have any medications that are different please contact us immediately at [phone]. Also review your allergies that we have listed, these may also include medications that you have not been able to tolerate, make sure everything listed is correct. If you have any allergies that are different please contact us immediately at [phone].

## 2020-06-22 NOTE — PROGRESS NOTES
Subjective:   She presents for follow up of her metastatic breast cancer, history of PE and DVT, hypertension and anxiety. She is still taking anxiety medication and blood pressures under control. She is followed by Dr. Heaven Cole for oncology treatment. She is on chronic opioids for chronic bone metastasis pain and tolerates well without signs of misuse or diversion. C/o clear nasal drainage lately. She is allergic to codeine. She   reports that she quit smoking about 21 years ago. Her smoking use included cigarettes. She has a 0.10 pack-year smoking history. She has never used smokeless tobacco. Review of systems negative for dust pain swelling shortness of breath wheezing abdominal pain rectal bleeding. Positive for sinus congestion and drainage  Objective:   /84   Pulse 95   Temp 98 °F (36.7 °C) (Temporal)   Wt 151 lb (68.5 kg)   SpO2 96%   BMI 27.62 kg/m²   BP Readings from Last 3 Encounters:   06/22/20 124/84   09/16/19 106/71   06/19/19 100/62     Physical Exam  Vitals signs reviewed. Constitutional:       Appearance: She is well-developed. She is not toxic-appearing. HENT:      Head: Normocephalic. Eyes:      General: No scleral icterus. Right eye: No discharge. Left eye: No discharge. Conjunctiva/sclera: Conjunctivae normal.   Neck:      Musculoskeletal: Neck supple. Thyroid: No thyroid mass or thyromegaly. Vascular: No carotid bruit. Cardiovascular:      Rate and Rhythm: Normal rate and regular rhythm. Heart sounds: Normal heart sounds. No murmur. Pulmonary:      Effort: No respiratory distress. Breath sounds: Normal breath sounds. No wheezing or rales. Abdominal:      General: There is no distension. Palpations: Abdomen is soft. There is no mass. Tenderness: There is no abdominal tenderness. There is no guarding or rebound. Lymphadenopathy:      Cervical: No cervical adenopathy.       Upper Body:      Right upper body: No

## 2020-07-09 NOTE — TELEPHONE ENCOUNTER
[facility] called patient is on oxygen and in her last note Oxygen is not mentioned . COuld you make a note about her oxygen? I will need to fax it to [phone].

## 2020-11-29 PROBLEM — A41.9 SEPSIS (HCC): Status: ACTIVE | Noted: 2020-01-01

## 2020-11-29 NOTE — ED NOTES
Patient returned from [address]. Monitor resumed and patient found in .      Magui Estrada RN  11/29/20 6221

## 2020-11-29 NOTE — ED PROVIDER NOTES
[facility]      CHIEF COMPLAINT  Tachycardia       HISTORY OF PRESENT ILLNESS  Helen Natarajan is a 77 y.o. female with a past medical history of metastatic breast cancer, COPD, GERD, PE on Xarelto, hypertension, hyperlipidemia, pneumonia, who presents to the ED complaining of found down with altered mental status. Patient is currently undergoing chemotherapy for metastatic breast cancer. Her last chemotherapy was yesterday. She is unable to checks on her daily. When they came to see her today, patient was found laying on the floor in the fetal position saying she was cold and that she could not get up. Patient appeared to have altered mental status. When EMS arrived, they stated she was hypoxic and not answering questions appropriately. She was in SVT. Patient was stabilized I was able to ask her more questions. She does not remember the circumstances of today or when she got on the ground. She denies any chest pain. She does report some pain in her bilateral hips and right knee. History is limited due to patient mental status. No other complaints, modifying factors or associated symptoms. I have reviewed the following from the nursing documentation.     Past Medical History:   Diagnosis Date    Abnormal CT scan, chest     Anemia 1/22/2015    Asthma     Axillary adenopathy     Breast cancer metastasized to bone [facility]) 2013    Stage IV adenocarcinoma    Breast mass 2013    Most likely metastatic right breast cancer    COPD (chronic obstructive pulmonary disease) (HCC)     DDD (degenerative disc disease), lumbar     GERD (gastroesophageal reflux disease)     H/O: hysterectomy     1984    Hemorrhoid     Removal in 1989    Hepatitis A 2011    Hilar adenopathy     Hot flashes     Hx of blood clots     1982    Hyperlipidemia     Hypertension     Osteoarthritis     knees    Osteopenia 2012    PE (pulmonary embolism) 1982    on BCP    Pneumonia     Pulmonary nodule      Past Surgical History:   Procedure Laterality Date    APPENDECTOMY      BREAST SURGERY      CARPAL TUNNEL RELEASE      B/L hands    CHOLECYSTECTOMY  1986    COLONOSCOPY  2004    ENDOSCOPY, COLON, DIAGNOSTIC      EYE SURGERY Right 10/11/2018    PHACO EMULSIFICATION OF CATARACT WITH  INTRAOCULAR LENS IMPLANT RIGHT EYE     HEMORRHOID SURGERY      HYSTERECTOMY  1984    INSERTION / REMOVAL / REPLACEMENT VENOUS ACCESS CATHETER Left 2019    PORT INSERTION performed by Rehan Chapa MD at [facility] 108 Right 2018    BREAST MASTECTOMY performed by Rehan Chapa MD at [address] [place] [address] INS IO LENS PROSTH W/O ECP Left 2018    PHACO EMULSIFICATION OF CATARACT WITH INTRAOCULAR LENS IMPLANT LEFT EYE performed by Jesse Herrera MD at [address] [place] [address] INS IO LENS PROSTH W/O ECP Right 10/11/2018    PHACO EMULSIFICATION OF CATARACT WITH  INTRAOCULAR LENS IMPLANT RIGHT EYE performed by Jesse Herrera MD at [facility] OR     Family History   Problem Relation Age of Onset    Arthritis Mother     Asthma Mother     Stroke Mother     Early Death Father     Arthritis Sister     Cancer Sister     Cancer Brother      Social History     Socioeconomic History    Marital status:      Spouse name: Not on file    Number of children: Not on file    Years of education: Not on file    Highest education level: Not on file   Occupational History    Not on file   Social Needs    Financial resource strain: Not on file    Food insecurity     Worry: Not on file     Inability: Not on file    Transportation needs     Medical: Not on file     Non-medical: Not on file   Tobacco Use    Smoking status: Former Smoker     Packs/day: 0.02     Years: 5.00     Pack years: 0.10     Types: Cigarettes     Last attempt to quit: 1999     Years since quittin.9    Smokeless tobacco: Never Used   Substance and Sexual Activity    Alcohol use: No    Drug tablet 3    Spacer/Aero-Holding Chambers (POCKET SPACER) RYAN by Does not apply route. Use with inhaler 1 Device 5     Allergies   Allergen Reactions    Codeine Other (See Comments)     Hallucinations       REVIEW OF SYSTEMS  10 systems reviewed, pertinent positives per HPI otherwise noted to be negative. PHYSICAL EXAM  /80   Pulse 113   Temp 97.7 °F (36.5 °C) (Axillary)   Resp 14   Ht 5' 2\" (1.575 m)   Wt 147 lb (66.7 kg)   SpO2 100%   BMI 26.89 kg/m²    GENERAL APPEARANCE: Awake and alert. Confused. HENT: Normocephalic. Atraumatic. Mucous membranes are dry. NECK: Supple. Full range of motion of the neck without stiffness or pain. EYES: PERRL. EOM's grossly intact. HEART/CHEST: RRR. No murmurs. Chest wall is not tender to palpation. LUNGS: Patient arrives on nonrebreather, diminished breath sounds bilaterally. .   ABDOMEN: Generalized tenderness. Soft. Non-distended. No masses. No organomegaly. No guarding or rebound. MUSCULOSKELETAL: No extremity edema. Tenderness to palpation of the right knee and bilateral hips. Compartments soft. No deformity. No tenderness in the extremities. All extremities neurovascularly intact. SKIN: Warm and dry. No acute rashes. NEUROLOGICAL: Alert alert and pleasantly confused. No gross facial drooping. Strength 5/5, sensation intact. PSYCHIATRIC: pleasantly confused. LABS  I have reviewed all labs for this visit.    Results for orders placed or performed during the hospital encounter of 01/58/00   Basic Metabolic Panel   Result Value Ref Range    Sodium 134 (L) 136 - 145 mmol/L    Potassium 3.6 3.5 - 5.1 mmol/L    Chloride 95 (L) 99 - 110 mmol/L    CO2 17 (L) 21 - 32 mmol/L    Anion Gap 22 (H) 3 - 16    Glucose 183 (H) 70 - 99 mg/dL    BUN 36 (H) 7 - 20 mg/dL    CREATININE 1.2 0.6 - 1.2 mg/dL    GFR Non- 44 (A) >60    GFR  53 (A) >60    Calcium 9.0 8.3 - 10.6 mg/dL   Troponin   Result Value Ref Range    Troponin 0.07 (H) <0.01 ng/mL   CBC Auto Differential   Result Value Ref Range    WBC 20.5 (H) 4.0 - 11.0 K/uL    RBC 4.72 4.00 - 5.20 M/uL    Hemoglobin 11.5 (L) 12.0 - 16.0 g/dL    Hematocrit 36.3 36.0 - 48.0 %    MCV 76.9 (L) 80.0 - 100.0 fL    MCH 24.4 (L) 26.0 - 34.0 pg    MCHC 31.8 31.0 - 36.0 g/dL    RDW 20.2 (H) 12.4 - 15.4 %    Platelets 395 052 - 730 K/uL    MPV 7.6 5.0 - 10.5 fL    Neutrophils % 90.7 %    Lymphocytes % 6.2 %    Monocytes % 2.8 %    Eosinophils % 0.0 %    Basophils % 0.3 %    Neutrophils Absolute 18.6 (H) 1.7 - 7.7 K/uL    Lymphocytes Absolute 1.3 1.0 - 5.1 K/uL    Monocytes Absolute 0.6 0.0 - 1.3 K/uL    Eosinophils Absolute 0.0 0.0 - 0.6 K/uL    Basophils Absolute 0.1 0.0 - 0.2 K/uL   Brain Natriuretic Peptide   Result Value Ref Range    Pro-BNP 2,343 (H) 0 - 449 pg/mL   D-Dimer, Quantitative   Result Value Ref Range    D-Dimer, Quant 819 (H) 0 - 229 ng/mL DDU   CK   Result Value Ref Range    Total CK 1,022 (H) 26 - 192 U/L   Lipase   Result Value Ref Range    Lipase 13.0 13.0 - 60.0 U/L   Lactic Acid, Plasma   Result Value Ref Range    Lactic Acid 5.7 (HH) 0.4 - 2.0 mmol/L   Urinalysis, reflex to microscopic   Result Value Ref Range    Color, UA Yellow Straw/Yellow    Clarity, UA Clear Clear    Glucose, Ur Negative Negative mg/dL    Bilirubin Urine Negative Negative    Ketones, Urine Negative Negative mg/dL    Specific Gravity, UA 1.025 1.005 - 1.030    Blood, Urine TRACE-INTACT (A) Negative    pH, UA 5.5 5.0 - 8.0    Protein, UA 30 (A) Negative mg/dL    Urobilinogen, Urine 0.2 <2.0 E.U./dL    Nitrite, Urine Negative Negative    Leukocyte Esterase, Urine Negative Negative    Microscopic Examination YES     Urine Type NotGiven    Ammonia   Result Value Ref Range    Ammonia 16 11 - 51 umol/L   Blood Gas, Venous   Result Value Ref Range    pH, Arnel 7.259 (L) 7.350 - 7.450    pCO2, Arnel 38.0 (L) 40.0 - 50.0 mmHg    pO2, Arnel 19.7 (L) 25.0 - 40.0 mmHg    HCO3, Venous 16.6 (L) 23.0 - 29.0 mmol/L    Base Excess, Arnel -9.7 (L) -3.0 - 3.0 mmol/L    O2 Sat, Arnel 26 Not Established %    Carboxyhemoglobin 2.1 (H) 0.0 - 1.5 %    MetHgb, Arnel 0.7 <1.5 %    TC02 (Calc), Arnel 18 Not Established mmol/L    O2 Content, Arnel 4 Not Established VOL %    O2 Therapy Unknown    Microscopic Urinalysis   Result Value Ref Range    WBC, UA 0-2 0 - 5 /HPF    RBC, UA 0-2 0 - 4 /HPF    Epithelial Cells, UA 0-1 0 - 5 /HPF    Bacteria, UA Rare (A) None Seen /HPF   Troponin   Result Value Ref Range    Troponin 0.10 (H) <0.01 ng/mL   Blood gas, venous   Result Value Ref Range    pH, Arnel 7.370 7.350 - 7.450    pCO2, Arnel 32.7 (L) 40.0 - 50.0 mmHg    pO2, Arnel 44.1 (H) 25.0 - 40.0 mmHg    HCO3, Venous 18.5 (L) 23.0 - 29.0 mmol/L    Base Excess, Arnel -6.0 (L) -3.0 - 3.0 mmol/L    O2 Sat, Arnel 79 Not Established %    Carboxyhemoglobin 2.4 (H) 0.0 - 1.5 %    MetHgb, Arnel 0.3 <1.5 %    TC02 (Calc), Arnel 20 Not Established mmol/L    O2 Content, Arnel 11 Not Established VOL %    O2 Therapy Unknown    Lactate, Sepsis   Result Value Ref Range    Lactic Acid, Sepsis 1.1 0.4 - 1.9 mmol/L   EKG 12 Lead   Result Value Ref Range    Ventricular Rate 207 BPM    Atrial Rate 207 BPM    P-R Interval 120 ms    QRS Duration 74 ms    Q-T Interval 212 ms    QTc Calculation (Bazett) 393 ms    P Axis 37 degrees    R Axis 37 degrees    T Axis 195 degrees    Diagnosis       Supraventricular tachycardiaMarked ST abnormality, possible inferior subendocardial injuryAbnormal ECGWhen compared with ECG of 13-JUN-2019 17:54,SVT is now present. Vent.  rate has increased  BPMST now depressed in Inferior leadsST now depressed in Anterolateral leadsNonspecific T wave abnormality, worse in Inferior leadsNonspecific T wave abnormality now evident in Anterolateral leadsConfirmed by Mercy Cabot MD, CHAUNCEY (1986) on 11/30/2020 7:20:04 AM   EKG 12 Lead   Result Value Ref Range    Ventricular Rate 206 BPM    Atrial Rate 206 BPM    P-R Interval 120 ms    QRS Duration 74 ms    Q-T Interval 212 ms    QTc Calculation (Bazett) 392 ms    P Axis 33 degrees    R Axis 33 degrees    T Axis 185 degrees    Diagnosis       SVT retrograde P waves in later part of QRS consistent with AV lamberto reentry tachycardia. Marked ST abnormality, possible inferior subendocardial injuryAbnormal ECGWhen compared with ECG of 29-NOV-2020 16:12, (unconfirmed)No significant change was foundConfirmed by CHAUNCEY Vidal Caro, MD (1986) on 11/30/2020 7:21:22 AM   EKG 12 Lead   Result Value Ref Range    Ventricular Rate 115 BPM    Atrial Rate 115 BPM    P-R Interval 166 ms    QRS Duration 78 ms    Q-T Interval 336 ms    QTc Calculation (Bazett) 464 ms    P Axis 39 degrees    R Axis 41 degrees    T Axis 51 degrees    Diagnosis       Sinus tachycardia with Premature atrial complexesLow voltage QRSBorderline ECGWhen compared with ECG of 29-NOV-2020 16:13, (unconfirmed)Premature atrial complexes are now PresentVent. rate has decreased BY  91 BPMST no longer depressed in Inferior leadsST no longer depressed in Anterolateral leadsT wave inversion no longer evident in Inferior leadsNonspecific T wave abnormality no longer evident in Anterolateral leadsNo evidence of preexcitation. Confirmed by Marcy Davila MD, [employer] (1986) on 11/30/2020 7:22:05 AM   EKG 12 Lead   Result Value Ref Range    Ventricular Rate 105 BPM    Atrial Rate 105 BPM    P-R Interval 170 ms    QRS Duration 80 ms    Q-T Interval 382 ms    QTc Calculation (Bazett) 504 ms    P Axis 41 degrees    R Axis 10 degrees    T Axis 19 degrees    Diagnosis       Sinus tachycardiaLow voltage QRSBorderline ECGWhen compared with ECG of 29-NOV-2020 16:19, (unconfirmed)Premature atrial complexes are no longer PresentNonspecific T wave abnormality now evident in Inferior leadsConfirmed by CHAUNCEY SANFORD MD (1986) on 11/30/2020 7:22:14 AM   EKG 12 Lead   Result Value Ref Range    Ventricular Rate 103 BPM    Atrial Rate 103 BPM    P-R Interval 172 ms    QRS Duration 84 ms    Q-T Interval 374 ms    QTc Calculation (Bazett) 489 ms    P Axis 27 degrees    R Axis 26 degrees    T Axis 24 degrees    Diagnosis       Sinus tachycardia with Premature supraventricular complexesLow voltage QRSBorderline ECGWhen compared with ECG of 29-NOV-2020 17:14, (unconfirmed)Premature supraventricular complexes are now PresentConfirmed by Yamilka Salgado MD, [address] (1986) on 11/30/2020 7:22:29 AM       ECG  The Ekg interpreted by me shows  supraventricular tachycardia with a rate of 207  Axis is   Normal  QTc is  normal  Intervals and Durations are unremarkable. ST Segments: nonspecific changes  Significant change from prior EKG dated 6/13/20    Repeat EKG adenosine showed sinus tachycardia with PACs at a rate of 115 bpm.  Axis is normal.  QTc is prolonged. ST segments showed nonspecific changes. Discussed EKGs with cardiology, they said there was possibly some evidence of ischemia when patient was having SVT. RADIOLOGY    XR KNEE RIGHT (1-2 VIEWS)   Final Result   No radiographic evidence of fracture         XR PELVIS (1-2 VIEWS)   Final Result   No radiographic evidence of fracture. Diffuse skeletal metastases. XR CHEST PORTABLE   Final Result   Patchy diffuse interstitial changes throughout the lungs without significant   interval change. This may be related to the patient's known pulmonary   metastatic disease. No new focal consolidation or evidence of overt failure. CT Cervical Spine WO Contrast   Final Result   No acute abnormality of the cervical spine. Diffuse osseous metastatic disease. Moderate degenerative disc disease at   C5-6 and C6-7 with multilevel facet arthropathy. CT Head WO Contrast   Final Result   Extensive bilateral cerebral and cerebellar multifocal peripherally dense   mass lesions consistent with metastatic disease. Recommend contrast enhanced MRI brain examination for further evaluation. Bilateral maxillary chronic sinusitis, as discussed above.          CT CHEST PULMONARY EMBOLISM W CONTRAST   Final Result   1. No pulmonary embolism or other acute abnormality in the chest, abdomen, or   pelvis. 2. Diffusely scattered pulmonary lesions compatible with metastases stable   from 10/13/2020.   3. Diffuse sclerotic osseous metastases also not significantly changed from   the previous studies. 4. Status post cholecystectomy with prominence of the biliary tree unchanged   from at least 06/09/2020 and likely representing a postoperative reservoir   effect. If there are clinical signs of cholestasis further evaluation could   be obtained with MRCP. CT ABDOMEN PELVIS W IV CONTRAST Additional Contrast? None   Final Result   1. No pulmonary embolism or other acute abnormality in the chest, abdomen, or   pelvis. 2. Diffusely scattered pulmonary lesions compatible with metastases stable   from 10/13/2020.   3. Diffuse sclerotic osseous metastases also not significantly changed from   the previous studies. 4. Status post cholecystectomy with prominence of the biliary tree unchanged   from at least 06/09/2020 and likely representing a postoperative reservoir   effect. If there are clinical signs of cholestasis further evaluation could   be obtained with MRCP. ED COURSE / MDM  Patient seen and evaluated. Old records reviewed. Labs and imaging reviewed and results discussed with patient. Patient arrived hypoxic with low blood pressure and SVT. She was given adenosine upon arrival with resolution of SVT. History of present illness significant for being found down today after unknown downtime. Physical exam remarkable for diminished breath sounds bilaterally, tachycardia, abdominal pain. history is limited due to patient's mental status. Her mental status does appear to be waxing and waning.     Differential diagnosis includes but is not limited to: Arrhythmia, ischemia, PE, abdominal infection, intracranial hemorrhage, NSTEMI, worsening metastatic cancer, seizure, 1709)   0.9 % sodium chloride bolus (0 mLs Intravenous Stopped 11/29/20 2009)   iopamidol (ISOVUE-370) 76 % injection 75 mL (75 mLs Intravenous Given 11/29/20 1708)   piperacillin-tazobactam (ZOSYN) 4.5 g in sodium chloride 0.9 % 100 mL IVPB (mini-bag) (0 g Intravenous Stopped 11/29/20 1817)   adenosine (ADENOCARD) injection 6 mg (6 mg Intravenous Given 11/29/20 1742)   aspirin tablet 325 mg (325 mg Oral Given 11/29/20 1824)      Workup remarkable for:    Patient arrived in SVT. She was converted multiple times with adenosine, consistently reverting back to SVT after approximately 20 to 30 minutes. I did discuss the case with cardiology and she was placed on diltiazem drip without further episodes of SVT. On arrival, patient was hypoxic and hypotensive. This improved with SVT conversion. Initial blood gas showed evidence of acidemia, this improved with oxygen administration. Patient was found laying on the floor for unknown time. The events that caused her to end up on the floor are unknown. Trauma work-up completed and did not show any acute traumatic injury. Patient did meet sepsis criteria. Even after conversion of SVT patient was tachycardic. Patient had a white count of 20.5. Lactate was 5.7. Patient received IV fluids and empiric antibiotics. Source unknown, urinalysis does not show evidence of infection. Chest imaging does not show evidence of acute infection. Abdominal imaging does not show evidence of acute infection. Repeat lactate after fluids had normalized. Patient had elevated troponin on arrival, this up trended on repeat. Patient also has elevated BNP. Patient denies chest pain. Cardiology when they reviewed her EKGs did say that there was evidence of possible ischemia when she was in SVT. Patient was given aspirin. She is anticoagulated on Eliquis. Repeat troponin had up trended. Troponin should be further trended. Patient downtime was unknown.   CK level was greater than 2000. Patient receiving fluids. D-dimer was elevated. Patient has a history of pulmonary emboli and cancer. She is on Eliquis. CT PE study did not show acute emboli. CT scan did show evidence of metastatic cancer in her lungs. CT scan of the head also showed new brain metastases. She was not hypoglycemic. Her ammonia was within normal limits. Based on results of work-up, I am concerned for new onset SVT, sepsis, and worsening metastatic breast cancer. At this time, do feel the patient requires admission for further work-up and management. Discussed the patient with hospital team.  She admitted to ICU at [facility]. 8:43 PM   I have signed out C/ Eros Hollis [facility] Emergency Department care to Dr. Preet Portillo. We discussed the pertinent history, physical exam, completed/pending test results (if applicable) and current treatment plan. Please refer to his/her chart for the patients remaining Emergency Department course and final disposition. CLINICAL IMPRESSION  1. Paroxysmal supraventricular tachycardia ([address]    2. Septicemia ([address]    3. Elevated troponin    4. Cancer ([address]    5. Altered mental status, unspecified altered mental status type        Blood pressure 110/66, pulse 93, temperature 97.7 °F (36.5 °C), temperature source Axillary, resp. rate 20, height 5' 2\" (1.575 m), weight 147 lb (66.7 kg), SpO2 100 %. DISPOSITION  C/ Eros Hollis 19 was Signed out to Dr. Preet Portillo while awaiting transfer to [facility] in stable condition. DISCLAIMER: This chart was created using Dragon dictation software. Efforts were made by me to ensure accuracy, however some errors may be present due to limitations of this technology and occasionally words are not transcribed correctly.         Erna Swartz MD  11/30/20 7259      I spent a total of 60 minutes of critical care time in obtaining history, performing a physical exam, bedside monitoring of interventions, collecting and interpreting tests and discussion with consultants but not including time spent performing procedures.      Clinical Concern SVT, hypotension, hypoxia, sepsis, altered mental status, elevated troponin    Intervention adenosine, diltiazem drip, antibiotics, IV fluids, cardiology consultation       Leanna Sanders MD  11/30/20 5902

## 2020-11-30 PROBLEM — Z79.01 CHRONIC ANTICOAGULATION: Status: ACTIVE | Noted: 2020-01-01

## 2020-11-30 PROBLEM — Z86.718 HISTORY OF RECURRENT DEEP VEIN THROMBOSIS (DVT): Chronic | Status: ACTIVE | Noted: 2020-01-01

## 2020-11-30 PROBLEM — I47.10 SVT (SUPRAVENTRICULAR TACHYCARDIA): Status: ACTIVE | Noted: 2020-01-01

## 2020-11-30 PROBLEM — C50.919 METASTATIC BREAST CANCER (HCC): Chronic | Status: ACTIVE | Noted: 2020-01-01

## 2020-11-30 PROBLEM — N17.9 AKI (ACUTE KIDNEY INJURY) (HCC): Status: ACTIVE | Noted: 2020-01-01

## 2020-11-30 PROBLEM — I47.1 SVT (SUPRAVENTRICULAR TACHYCARDIA) (HCC): Status: ACTIVE | Noted: 2020-01-01

## 2020-11-30 NOTE — CONSULTS
ONCOLOGY HEMATOLOGY CARE CONSULT NOTE      Requesting Physician:  Dr. Janneth Coates:  Met breast cancer        HISTORY OF PRESENT ILLNESS:      Ms. Ary Draper  is a 77 y.o. female we are seeing in consultation for metastatic breast cancer. This patient was admitted to [address] [place] [address] on 11/30/2020 after being found down. She is cared for by my partner, Dr. Hector Saenz. She is being treated with 3rd line Eribulin chemo. She is due for cycle 26, day 8 Eribulin on 12/02/2020. Imaging with a CTPA on 11/29/2020 and a CT abd/pelvis on 11/29/2020 showed stable disease outside of the CNS. She had a CT of the head on 11/29/2020, which showed brain mets. She is A/O x 3 right now. Able to move all of her extremities.     Past Medical History:    Past Medical History:   Diagnosis Date    Abnormal CT scan, chest     Anemia 1/22/2015    Asthma     Axillary adenopathy     Breast cancer metastasized to [facility]) 2013    Stage IV adenocarcinoma    Breast mass 2013    Most likely metastatic right breast cancer    COPD (chronic obstructive pulmonary disease) (HCC)     DDD (degenerative disc disease), lumbar     GERD (gastroesophageal reflux disease)     H/O: hysterectomy     1984    Hemorrhoid     Removal in 1989    Hepatitis A 2011    Hilar adenopathy     Hot flashes     Hx of blood clots     1982    Hyperlipidemia     Hypertension     Osteoarthritis     knees    Osteopenia 2012    PE (pulmonary embolism) 1982    on BCP    Pneumonia     Pulmonary nodule      Past Surgical History:    Past Surgical History:   Procedure Laterality Date    APPENDECTOMY      BREAST SURGERY      CARPAL TUNNEL RELEASE  1993    B/L hands    CHOLECYSTECTOMY  1986    COLONOSCOPY  2004    ENDOSCOPY, COLON, DIAGNOSTIC      EYE SURGERY Right 10/11/2018    PHACO EMULSIFICATION OF CATARACT WITH  INTRAOCULAR LENS IMPLANT RIGHT EYE     HEMORRHOID SURGERY      HYSTERECTOMY  1984    INSERTION / REMOVAL / tablet 25 mg  25 mg Oral BID Jazmine Kirby MD   25 mg at 20 0855    mupirocin (BACTROBAN) 2 % ointment   Nasal BID Bear Tafoya MD         Allergies:     Allergies   Allergen Reactions    Codeine Other (See Comments)     Hallucinations       Social History:   Social History     Socioeconomic History    Marital status:      Spouse name: Not on file    Number of children: Not on file    Years of education: Not on file    Highest education level: Not on file   Occupational History    Not on file   Social Needs    Financial resource strain: Not on file    Food insecurity     Worry: Not on file     Inability: Not on file    Transportation needs     Medical: Not on file     Non-medical: Not on file   Tobacco Use    Smoking status: Former Smoker     Packs/day: 0.02     Years: 5.00     Pack years: 0.10     Types: Cigarettes     Last attempt to quit: 1999     Years since quittin.9    Smokeless tobacco: Never Used   Substance and Sexual Activity    Alcohol use: No    Drug use: No    Sexual activity: Never   Lifestyle    Physical activity     Days per week: Not on file     Minutes per session: Not on file    Stress: Not on file   Relationships    Social connections     Talks on phone: Not on file     Gets together: Not on file     Attends Jain service: Not on file     Active member of club or organization: Not on file     Attends meetings of clubs or organizations: Not on file     Relationship status: Not on file    Intimate partner violence     Fear of current or ex partner: Not on file     Emotionally abused: Not on file     Physically abused: Not on file     Forced sexual activity: Not on file   Other Topics Concern    Not on file   Social History Narrative    Not on file          Family History:     Family History   Problem Relation Age of Onset    Arthritis Mother     Asthma Mother     Stroke Mother     Early Death Father     Arthritis Sister     Cancer Sister  Cancer Brother      REVIEW OF SYSTEMS:    Review of Systems    PHYSICAL EXAM:      Vitals:  BP 92/66   Pulse 74   Temp 98.7 °F (37.1 °C) (Oral)   Resp 15   Ht 5' 2\" (1.575 m)   Wt 149 lb 4 oz (67.7 kg)   SpO2 96%   BMI 27.30 kg/m²     CONSTITUTIONAL:  awake, alert, cooperative, no apparent distress, and appears stated age NAD  EYES:  pupils equal, round and reactive to light, extra ocular muscles intact,sclera clear, conjunctiva normal  NECK:  Supple, symmetrical, trachea midline, no adenopathy, thyroid symmetric, not enlarged and no tenderness, skin normal  HEMATOLOGIC/LYMPHATICS:  no cervical lymphadenopathy, nosupraclavicular lymphadenopathy, no axillary lymphadenopathy and no inguinal lymphadenopathy  LUNGS:  No increased work of breathing, good air exchange, clear to auscultation bilaterally, no crackles or wheezing  CARDIOVASCULAR:  , regular rate and rhythm, normal S1 and S2, no S3 or S4, and no murmur noted  ABDOMEN:  No scars, normal bowel sounds, soft, non-distended, non-tender, no masses palpated, no hepatosplenomegally      MUSCULOSKELETAL:  There is no redness, warmth, or swelling of the joints. Full range of motion noted. Motor strength is 5 out of 5 all extremities bilaterally. NEUROLOGIC:  Awake, alert, oriented to name, place andtime. Cranial nerves II-XII are grossly intact. Motor is 5 out of 5 bilaterally. SKIN:  no bruising or bleeding      DATA:    PT/INR:    Recent Labs     11/30/20  0235   PROT 6.5   INR 1.22*     PTT:No results for input(s): APTT in the last 72 hours.   CMP:    Lab Results   Component Value Date     11/30/2020    K 3.2 11/30/2020    K 4.1 06/14/2019    CL 94 11/30/2020    CO2 21 11/30/2020    BUN 31 11/30/2020    PROT 6.5 11/30/2020    PROT 7.5 08/24/2017     Magnesium:    Lab Results   Component Value Date    MG 1.40 11/30/2020     Phosphorus:  No components found for: PO4  Calcium:  No components found for: CA  CBC:    Lab Results   Component Value Date    WBC 6.9 11/30/2020    RBC 3.85 11/30/2020    RBC 4.49 08/24/2017    HGB 9.5 11/30/2020    HCT 28.9 11/30/2020    MCV 75.1 11/30/2020    RDW 20.2 11/30/2020     11/30/2020     DIFF:    Lab Results   Component Value Date    MCV 75.1 11/30/2020    RDW 20.2 11/30/2020      LDH:  @labcrnt(LDH)@  Uric Acid:  @labcrnt(URIC)@    Radiology Review:        Problem List  Patient Active Problem List   Diagnosis    GERD (gastroesophageal reflux disease)    Malignant neoplasm of upper-outer quadrant of right breast in female, estrogen receptor negative ([address]    DDD (degenerative disc disease), lumbar    Anemia    Estrogen receptor negative    Acute deep vein thrombosis (DVT) of distal end of right lower extremity ([address]    Essential hypertension    Mixed hyperlipidemia    Generalized anxiety disorder    Primary osteoarthritis involving multiple joints    Secondary malignant neoplasm of bone ([address]    Breast cancer in female ([address]    SOB (shortness of breath)    History of pulmonary embolus (PE)    SVT (supraventricular tachycardia) ([address]    Metastatic breast cancer ([address]    JAYNA (acute kidney injury) ([address]    History of recurrent deep vein thrombosis (DVT)    Chronic anticoagulation    New onset seizure ([address]    Acute encephalopathy    HTN (hypertension), benign       IMPRESSION/RECOMMENDATIONS:  1.) Triple neg breast cancer  - Currently receiving 3rd line Eribulin. Due on 12/02/2020 for cycle 28, day 8 chemo. Will hold. - CT chest, 10/13/2020, was stable. - Updated CTPA, CT abd/pelvis this admission were both stable. 2.) Brain mets  - CT head, 11/29/2020, showed extensive brain mets. - Will start Dex. - Defer Keppra to Neuro for now. - Can proceed with RT as an outpatient. 3.) RLE DVT  - Diagnosed on 11/15/2016. Thank you for asking me to see the patient. 4.) SVT  - Defer to cardiology.  - HR 78 on monitor currently.     Ciara Howe MD  PleaseContact Through [employer]

## 2020-11-30 NOTE — ED NOTES
Stopped vancomycin at this time. Pt. Denies any needs at this time. Notified her that we are just waiting on bed.      Mohinder Cat RN  11/29/20 2045

## 2020-11-30 NOTE — ED NOTES
Pt. Sleeping at this time, breathing easy and unlabored. Notified pt. That we are still waiting on [facility] transport.      Shyam Abarca RN  11/29/20 [id number]

## 2020-11-30 NOTE — H&P
Hospital Medicine History & Physical      Patient:  Halle Mora  :     MRN:   [id number]  Date of Service: 20    CHIEF COMPLAINT:  Found down    HISTORY OF PRESENT ILLNESS:    Halle Mora is a 77 y.o. female. She was accepted in transfer from [facility] ER. She presented there via ambulance from her home. She was found on the floor and confused by a visiting family member who activated EMS. She was found shivering in the fetal position. Hypoxia and tachycardia (SVT) were noted by medics. At this point the patient reports she remembers the event completely. She says she tripped over a cat. She cannot tell me how long she was on the floor though. She denies awareness of any gaps in her memory. She does not recall having a headache at any point during this episode. She has a h/o right breast invasive ductal carcinoma dating back to . She was treated with Xeloda. In  a new mass was found. She underwent R mastectomy 2018. Malignancy is now known to be metastatic to bone and lungs. Head CT at [facility] additionally revealed numerous brain metastases. She also has a h/o DVT and PE dating back to  which recurred w/ the malignancy. She is now chronically anticoaguled with rivaroxaban. Receiving maintenance eribulin (3rd line) plus denosumab for bony mets. Follows with Dr. Josh Sepulveda. She was found on CT of the head at [facility] to have brain metastases. Review of Systems:  All pertinent positives and negatives are as noted in the HPI section. All other systems were reviewed and are negative.     Past Medical History:   Diagnosis Date    Abnormal CT scan, chest     Anemia 2015    Asthma     Axillary adenopathy     Breast cancer metastasized to bone [facility])     Stage IV adenocarcinoma    Breast mass     Most likely metastatic right breast cancer    COPD (chronic obstructive pulmonary disease) (HCC)     DDD (degenerative disc disease), lumbar     GERD (gastroesophageal reflux disease)     H/O: hysterectomy     1984    Hemorrhoid     Removal in 1989    Hepatitis A 2011    Hilar adenopathy     Hot flashes     Hx of blood clots     1982    Hyperlipidemia     Hypertension     Osteoarthritis     knees    Osteopenia 2012    PE (pulmonary embolism) 1982    on BCP    Pneumonia     Pulmonary nodule        Past Surgical History:   Procedure Laterality Date    APPENDECTOMY      BREAST SURGERY      CARPAL TUNNEL RELEASE  1993    B/L hands    CHOLECYSTECTOMY  1986    COLONOSCOPY  2004    ENDOSCOPY, COLON, DIAGNOSTIC      EYE SURGERY Right 10/11/2018    PHACO EMULSIFICATION OF CATARACT WITH  INTRAOCULAR LENS IMPLANT RIGHT EYE     HEMORRHOID SURGERY      HYSTERECTOMY  1984    INSERTION / REMOVAL / REPLACEMENT VENOUS ACCESS CATHETER Left 4/2/2019    PORT INSERTION performed by Leslee Gaytan MD at [place] [id number] Right 12/7/2018    BREAST MASTECTOMY performed by Leslee Gaytan MD at [address] [place] [address] IO LENS PROSTH W/O ECP Left 9/13/2018    PHACO EMULSIFICATION OF CATARACT WITH INTRAOCULAR LENS IMPLANT LEFT EYE performed by Millie Zavala MD at [address] [place] [address] IO LENS PROSTH W/O ECP Right 10/11/2018    PHACO EMULSIFICATION OF CATARACT WITH  INTRAOCULAR LENS IMPLANT RIGHT EYE performed by Millie [facility]ala MD at [facility]         Prior to Admission medications    Medication Sig Start Date End Date Taking?  Authorizing Provider   sertraline (ZOLOFT) 100 MG tablet Take 1 tablet by mouth daily 11/18/20   Manny Ferrari MD   omeprazole (PRILOSEC) 20 MG delayed release capsule TAKE 1 CAPSULE BY MOUTH EVERY DAY 10/9/20   Manny Ferrari MD   XARELTO 20 MG TABS tablet TAKE 1 TABLET BY MOUTH EVERY DAY WITH BREAKFAST 7/8/20   Manny Ferrari MD   losartan-hydroCHLOROthiazide [facility]) 50-12.5 MG per tablet TAKE 1 TABLET BY MOUTH EVERY DAY 7/6/20   Manny Ferrari MD   fentaNYL metastatic disease. Recommend contrast enhanced MRI brain examination for further evaluation. Bilateral maxillary chronic sinusitis, as discussed above. Ct Cervical Spine Wo Contrast    Result Date: 11/29/2020  EXAMINATION: CT OF THE CERVICAL SPINE WITHOUT CONTRAST 11/29/2020 4:29 pm TECHNIQUE: CT of the cervical spine was performed without the administration of intravenous contrast. Multiplanar reformatted images are provided for review. Dose modulation, iterative reconstruction, and/or weight based adjustment of the mA/kV was utilized to reduce the radiation dose to as low as reasonably achievable. COMPARISON: None. HISTORY: ORDERING SYSTEM PROVIDED HISTORY: found down TECHNOLOGIST PROVIDED HISTORY: Reason for exam:->found down Reason for Exam: pt found down, HX of breast CA with mets Acuity: Acute Type of Exam: Initial FINDINGS: BONES/ALIGNMENT: There is no acute fracture or traumatic malalignment. DEGENERATIVE CHANGES: Diffuse osseous metastatic disease is noted. There is no evidence of pathologic fracture. Moderate degenerative disc disease at C5-6 and C6-7. Severe multilevel facet arthropathy. Heterotopic ossicles are noted about the C4-5 facet on the left. 2 mm anterolisthesis of C4 on C5 likely related to the facet arthropathy. Degenerative change at the C1-2 articulation anteriorly. SOFT TISSUES: There is no prevertebral soft tissue swelling. Patchy ground-glass and nodular opacities within the lungs bilaterally, likely related to the patient's known pulmonary metastatic disease and similar to chest CT 10/13/2020. No acute abnormality of the cervical spine. Diffuse osseous metastatic disease. Moderate degenerative disc disease at C5-6 and C6-7 with multilevel facet arthropathy.      Ct Abdomen Pelvis W Iv Contrast Additional Contrast? None    Result Date: 11/29/2020  EXAMINATION: CTA OF THE CHEST; CT OF THE ABDOMEN AND PELVIS WITH CONTRAST 11/29/2020 5:10 pm TECHNIQUE: CTA of the chest was performed after the administration of intravenous contrast.  Multiplanar reformatted images are provided for review. MIP images are provided for review. Dose modulation, iterative reconstruction, and/or weight based adjustment of the mA/kV was utilized to reduce the radiation dose to as low as reasonably achievable.; CT of the abdomen and pelvis was performed with the administration of intravenous contrast. Multiplanar reformatted images are provided for review. Dose modulation, iterative reconstruction, and/or weight based adjustment of the mA/kV was utilized to reduce the radiation dose to as low as reasonably achievable. COMPARISON: Chest radiograph 11/29/2020. CT chest 10/13/2020. CT chest, abdomen, and pelvis 06/09/2020. HISTORY: ORDERING SYSTEM PROVIDED HISTORY: r/o Pulmonary Embolism TECHNOLOGIST PROVIDED HISTORY: Reason for exam:->r/o Pulmonary Embolism Reason for Exam: pt found down, HX of breast CA with mets Acuity: Acute Type of Exam: Initial; ORDERING SYSTEM PROVIDED HISTORY: abdominal pain, patient found unresponsive TECHNOLOGIST PROVIDED HISTORY: Reason for exam:->abdominal pain, patient found unresponsive Additional Contrast?->None Reason for Exam: pt found down, HX of breast CA with mets Acuity: Acute Type of Exam: Initial FINDINGS: Chest: Pulmonary Arteries: Pulmonary arteries are adequately opacified for evaluation. No evidence of intraluminal filling defect to suggest pulmonary embolism. Main pulmonary artery is normal in caliber. Mediastinum: The thoracic aorta is normal in caliber with mild atherosclerotic vascular disease. Coronary artery atherosclerotic vascular calcifications are seen. A left chest port terminates in the lower superior vena cava. No acute abnormality in the branch vessels of the superior mediastinum. Heart is normal in size. No pericardial effusion. Small hernia. The mediastinal esophagus thyroid gland are unremarkable.  Ill-defined subcentimeter mediastinal and hilar nodes without significant change. Lungs/pleura: The central airways are patent. No pleural effusion or pneumothorax. 5.2 x 2.8 cm irregular mass in the medial aspect of the right upper lobe on axial image 79 not significantly changed from 10/13/2020. Diffusely scattered bilateral pulmonary ground-glass small nodular opacities not significantly changed from 10/13/2020. No definite new opacities. No interlobular septal thickening. Soft Tissues/Bones: Extensive sclerotic metastases without significant change. No acute osseous or soft tissue abnormality. Abdomen/Pelvis: Organs: No focal hepatic abnormality. Status post cholecystectomy with prominence of the biliary tree unchanged from at least 06/09/2020. No obstructing stone or mass identified. The pancreas, spleen, and bilateral adrenal glands are unremarkable. Bilateral renal cysts measuring up to approximately 12 mm. No obstructive uropathy. GI/Bowel: Mildly increased stool in the ascending and transverse colon. No evidence of acute appendicitis. No obstruction or wall thickening identified. Pelvis: The urinary bladder is normal in appearance. Status post hysterectomy. No free fluid. No pelvic or inguinal lymphadenopathy. Peritoneum/Retroperitoneum: The abdominal aorta is normal in caliber with mild atherosclerotic vascular disease. No retroperitoneal or mesenteric lymphadenopathy is identified. No free air or fluid is seen in the abdomen. Bones/Soft Tissues: Extensive sclerotic metastases without significant change. 1. No pulmonary embolism or other acute abnormality in the chest, abdomen, or pelvis. 2. Diffusely scattered pulmonary lesions compatible with metastases stable from 10/13/2020. 3. Diffuse sclerotic osseous metastases also not significantly changed from the previous studies.  4. Status post cholecystectomy with prominence of the biliary tree unchanged from at least 06/09/2020 and likely representing a postoperative reservoir effect. If there are clinical signs of cholestasis further evaluation could be obtained with MRCP. Xr Chest Portable    Result Date: 11/29/2020  EXAMINATION: ONE XRAY VIEW OF THE CHEST 11/29/2020 4:29 pm COMPARISON: 06/13/2019. HISTORY: ORDERING SYSTEM PROVIDED HISTORY: AMS TECHNOLOGIST PROVIDED HISTORY: Reason for exam:->AMS Reason for Exam: pt found down, HX of breast CA with mets Acuity: Acute Type of Exam: Initial FINDINGS: The cardiomediastinal silhouette is unremarkable. Patchy interstitial changes throughout the lungs, without significant interval change. No focal consolidation or significant pleural fluid. Remote calcified granulomatous disease. Left-sided venous access port     Patchy diffuse interstitial changes throughout the lungs without significant interval change. This may be related to the patient's known pulmonary metastatic disease. No new focal consolidation or evidence of overt failure. Ct Chest Pulmonary Embolism W Contrast    Result Date: 11/29/2020  EXAMINATION: CTA OF THE CHEST; CT OF THE ABDOMEN AND PELVIS WITH CONTRAST 11/29/2020 5:10 pm TECHNIQUE: CTA of the chest was performed after the administration of intravenous contrast.  Multiplanar reformatted images are provided for review. MIP images are provided for review. Dose modulation, iterative reconstruction, and/or weight based adjustment of the mA/kV was utilized to reduce the radiation dose to as low as reasonably achievable.; CT of the abdomen and pelvis was performed with the administration of intravenous contrast. Multiplanar reformatted images are provided for review. Dose modulation, iterative reconstruction, and/or weight based adjustment of the mA/kV was utilized to reduce the radiation dose to as low as reasonably achievable. COMPARISON: Chest radiograph 11/29/2020. CT chest 10/13/2020. CT chest, abdomen, and pelvis 06/09/2020.  HISTORY: ORDERING SYSTEM PROVIDED HISTORY: r/o Pulmonary Embolism TECHNOLOGIST PROVIDED HISTORY: Reason for exam:->r/o Pulmonary Embolism Reason for Exam: pt found down, HX of breast CA with mets Acuity: Acute Type of Exam: Initial; ORDERING SYSTEM PROVIDED HISTORY: abdominal pain, patient found unresponsive TECHNOLOGIST PROVIDED HISTORY: Reason for exam:->abdominal pain, patient found unresponsive Additional Contrast?->None Reason for Exam: pt found down, HX of breast CA with mets Acuity: Acute Type of Exam: Initial FINDINGS: Chest: Pulmonary Arteries: Pulmonary arteries are adequately opacified for evaluation. No evidence of intraluminal filling defect to suggest pulmonary embolism. Main pulmonary artery is normal in caliber. Mediastinum: The thoracic aorta is normal in caliber with mild atherosclerotic vascular disease. Coronary artery atherosclerotic vascular calcifications are seen. A left chest port terminates in the lower superior vena cava. No acute abnormality in the branch vessels of the superior mediastinum. Heart is normal in size. No pericardial effusion. Small hernia. The mediastinal esophagus thyroid gland are unremarkable. Ill-defined subcentimeter mediastinal and hilar nodes without significant change. Lungs/pleura: The central airways are patent. No pleural effusion or pneumothorax. 5.2 x 2.8 cm irregular mass in the medial aspect of the right upper lobe on axial image 79 not significantly changed from 10/13/2020. Diffusely scattered bilateral pulmonary ground-glass small nodular opacities not significantly changed from 10/13/2020. No definite new opacities. No interlobular septal thickening. Soft Tissues/Bones: Extensive sclerotic metastases without significant change. No acute osseous or soft tissue abnormality. Abdomen/Pelvis: Organs: No focal hepatic abnormality. Status post cholecystectomy with prominence of the biliary tree unchanged from at least 06/09/2020. No obstructing stone or mass identified.   The pancreas, spleen, and bilateral adrenal glands are unremarkable. Bilateral renal cysts measuring up to approximately 12 mm. No obstructive uropathy. GI/Bowel: Mildly increased stool in the ascending and transverse colon. No evidence of acute appendicitis. No obstruction or wall thickening identified. Pelvis: The urinary bladder is normal in appearance. Status post hysterectomy. No free fluid. No pelvic or inguinal lymphadenopathy. Peritoneum/Retroperitoneum: The abdominal aorta is normal in caliber with mild atherosclerotic vascular disease. No retroperitoneal or mesenteric lymphadenopathy is identified. No free air or fluid is seen in the abdomen. Bones/Soft Tissues: Extensive sclerotic metastases without significant change. 1. No pulmonary embolism or other acute abnormality in the chest, abdomen, or pelvis. 2. Diffusely scattered pulmonary lesions compatible with metastases stable from 10/13/2020. 3. Diffuse sclerotic osseous metastases also not significantly changed from the previous studies. 4. Status post cholecystectomy with prominence of the biliary tree unchanged from at least 06/09/2020 and likely representing a postoperative reservoir effect. If there are clinical signs of cholestasis further evaluation could be obtained with MRCP. I directly reviewed all recent imaging studies as well as pertinent prior studies. EKG:  New and pertinent prior tracings were directly reviewed. My interpretation is as follows:  Tracing #1 and #2 show a rapid, regular narrow complex tachcyardia c/w SVT. All subsequent tracings are sinus tachycardia or sinus rhythm.     Active Hospital Problems    Diagnosis Date Noted    Metastatic breast cancer [facility]) Renetta Germain 11/30/2020    JAYNA (acute kidney injury) ([facility] [address] [N17.9] 11/30/2020    History of recurrent deep vein thrombosis (DVT) [Z86.718] 11/30/2020    Chronic anticoagulation [Z79.01] 11/30/2020    SVT (supraventricular tachycardia) (HCC) [I47.1] 11/29/2020       ASSESSMENT & PLAN  Stage IV Breast CA (invasive ductal, triple negative)  -  Known lung and bony metastases. Now with newly discovered brain lesions. Proceed with MRI brain w/ and w/o gadolinium.  -  Unclear why patient fell although she now says she tripped on a cat. To medics and to her physician at [place] [address]. [place] she appeared quite ill. Much of her lab was also quite abnormal.  My concern is that she may have had a seizure which by itself could explain these events as well as all the lab abnormalities. I'll request input from the neurology service. -  I see no evidence of an infectious process. Check procalcitonin. No empiric antibiotics will be given at this time. -  Will also request Hem/Onc input with this new finding of metastatic brain lesions. SVT  -  Converted at [place] [address]. [place] w/ adenosine. Now on diltiazem infusion in normal sinus rhythm. -  Stop diltiazem infusion. Start po metoprolol 25mg bid in the morning. JAYNA  -  Mild. Baseline creatinine ~ 0.8 and currently 1.2.  -  Avoid nephrotoxin exposure where possible. Patient received IV contrast at [place] [id number]. Or[place] during CT studies. Also received   -  Patient received 2L NS at [place] [address]. [place]. Patient may be transferred to Med/Surg bed w/ telemetry. DVT prophylaxis: SCDs, rivaroxaban  Stress ulcers:  Home PPI  Code Status:  Full code. Warrants further discussion. Desires full code for now. Will request palliatve care involvement.   Disposition:  Inpatient    Zac Vegas MD

## 2020-11-30 NOTE — ED NOTES
Report given to Lukasz Ritter at [facility]. Notified him of transport being on the way and should be here within the hour.      Ariella Tafoya RN  11/29/20 [id number]

## 2020-11-30 NOTE — ED NOTES
Notified pt. That EMS should be here within the hour to pick her up and transport to [facility]. Pt.  Verbalized understanding.      Anthony Willson RN  11/30/20 [id number]

## 2020-11-30 NOTE — CONSULTS
Electrophysiology Consultation   Date: 11/30/2020  Admit Date:  11/30/2020  Reason for Consultation: Supraventricular tachycardia  Consult Requesting Physician: Bereket Patel MD     No chief complaint on file. HPI:   Mrs. Jacqui Martinez is a pleasant yet unfortunate 77year old female with a medical history significant for metastatic recurrent breast cancer with metastasis to her brain (invasive ductal), hypertension, hyperlipidemia, history of deep vein thrombosis on chronic anticoagulation, and anemia who presents from home with syncope and altered mental status. According to patient she was doing well after her most recent chemotherapy on last Wednesday. She presented yesterday after being found down by her neighbor or family member (per chart). She states she was walking to another room and suddenly collapsed and was on the ground. She does not member anything before or after. She denies lightheadedness, dizziness, chest pain, shortness of breath, nausea, vomiting, diarrhea, etc.  EMS was called and picked patient up. Patient was found to be in a narrow complex tachycardia. She was evaluated emergency and treated with multiple rounds of adenosine that resulted in restoration of normal sinus rhythm/tachycardia. Remainder of her work-up was concerning for sepsis and acute renal failure. Electrophysiology was consulted to assist with management of patient's SVT. .    Of note, patient reports that she has had decreased PO and was advised by her oncologist to increase PO intake. She believes she was very dehydrated prior to admission/presentation.     Past Medical History:   Diagnosis Date    Abnormal CT scan, chest     Anemia 1/22/2015    Asthma     Axillary adenopathy     Breast cancer metastasized to bone [facility]) 2013    Stage IV adenocarcinoma    Breast mass 2013    Most likely metastatic right breast cancer    COPD (chronic obstructive pulmonary disease) (HCC)     DDD (degenerative disc disease), lumbar     GERD (gastroesophageal reflux disease)     H/O: hysterectomy     1984    Hemorrhoid     Removal in 1989    Hepatitis A 2011    Hilar adenopathy     Hot flashes     Hx of blood clots     1982    Hyperlipidemia     Hypertension     Osteoarthritis     knees    Osteopenia 2012    PE (pulmonary embolism) 1982    on BCP    Pneumonia     Pulmonary nodule         Past Surgical History:   Procedure Laterality Date    APPENDECTOMY      BREAST SURGERY      CARPAL TUNNEL RELEASE  1993    B/L hands    CHOLECYSTECTOMY  1986    COLONOSCOPY  2004    ENDOSCOPY, COLON, DIAGNOSTIC      EYE SURGERY Right 10/11/2018    PHACO EMULSIFICATION OF CATARACT WITH  INTRAOCULAR LENS IMPLANT RIGHT EYE     HEMORRHOID SURGERY      HYSTERECTOMY  1984    INSERTION / REMOVAL / REPLACEMENT VENOUS ACCESS CATHETER Left 4/2/2019    PORT INSERTION performed by Brianna Penny MD at [address] Right 12/7/2018    BREAST MASTECTOMY performed by Brianna Penny MD at [address] [place] [address] IO LENS PROSTH W/O ECP Left 9/13/2018    PHACO EMULSIFICATION OF CATARACT WITH INTRAOCULAR LENS IMPLANT LEFT EYE performed by Carmela Guerrero MD at [address] [place] [address] IO LENS PROSTH W/O ECP Right 10/11/2018    PHACO EMULSIFICATION OF CATARACT WITH  INTRAOCULAR LENS IMPLANT RIGHT EYE performed by Carmela Guerrero MD at [place] [address]   Allergen Reactions    Codeine Other (See Comments)     Hallucinations       Social History:  Reviewed. reports that she quit smoking about 21 years ago. Her smoking use included cigarettes. She has a 0.10 pack-year smoking history. She has never used smokeless tobacco. She reports that she does not drink alcohol or use drugs. Family History:  Reviewed. family history includes Arthritis in her mother and sister; Asthma in her mother; Cancer in her brother and sister; Early Death in her father; Stroke in her mother. No premature CAD. edema    · Lymphadenopathy: Has no cervical adenopathy. · Neurological: Alert and oriented. Cranial nerve II-XII grossly intact. · Skin: Skin is warm and dry. No rash, lesions, ulcerations noted. · Psychiatric: No anxiety nor agitation. Labs:  Reviewed. Recent Labs     11/29/20  1615 11/30/20  0235   * 128*   K 3.6 3.2*   CL 95* 94*   CO2 17* 21   PHOS  --  3.7   BUN 36* 31*   CREATININE 1.2 0.9     Recent Labs     11/29/20  1615 11/30/20  0235   WBC 20.5* 6.9   HGB 11.5* 9.5*   HCT 36.3 28.9*   MCV 76.9* 75.1*    219     Lab Results   Component Value Date    CKTOTAL 1,022 11/29/2020    TROPONINI 0.10 11/29/2020     No results found for: BNP  Lab Results   Component Value Date    PROTIME 14.2 11/30/2020    INR 1.22 11/30/2020     Lab Results   Component Value Date    CHOL 207 05/10/2013    HDL 44 05/10/2013    TRIG 270 05/10/2013     Diagnostic and imaging results reviewed. ECG: Narrow complex tachycardia. V1 has r1 and II has s'. 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 Echo: 06/28/2019   Summary   Left ventricular systolic function is normal with ejection fraction   estimated at 55%. No regional wall motion abnormalities. There is mild concentric left ventricular hypertrophy. Grade I diastolic dysfunction with normal left ventricular filling pressure. Mild tricuspid regurgitation. Systolic pulmonary artery pressure (SPAP) is estimated at 50 mmHg consistent   with moderate pulmonary hypertension (Right atrial pressure of 3 mmHg). I independently reviewed the ECG and telemetry.     Scheduled Meds:   fentaNYL  1 patch Transdermal Q72H    pantoprazole  40 mg Oral QAM AC    sertraline  100 mg Oral Daily    rivaroxaban  20 mg Oral Daily with breakfast    metoprolol tartrate  25 mg Oral BID    mupirocin   Nasal BID     Continuous Infusions:  PRN Meds:.sodium chloride flush, potassium chloride, magnesium sulfate, acetaminophen **OR** acetaminophen, ondansetron, oxyCODONE-acetaminophen     Assessment:   Patient Active Problem List    Diagnosis Date Noted    Metastatic breast cancer ([address] 11/30/2020    JAYNA (acute kidney injury) ([address] 11/30/2020    History of recurrent deep vein thrombosis (DVT) 11/30/2020    Chronic anticoagulation 11/30/2020    New onset seizure ([address]     Acute encephalopathy     HTN (hypertension), benign     SVT (supraventricular tachycardia) ([address] 11/29/2020    History of pulmonary embolus (PE) 09/16/2019    SOB (shortness of breath) 06/19/2019    Breast cancer in female [facility]) 12/07/2018    Essential hypertension 12/29/2016    Mixed hyperlipidemia 12/29/2016    Generalized anxiety disorder 12/29/2016    Primary osteoarthritis involving multiple joints 12/29/2016    Secondary malignant neoplasm of bone ([address] 12/29/2016    Acute deep vein thrombosis (DVT) of distal end of right lower extremity ([address] 11/16/2016    Estrogen receptor negative 10/16/2015    Anemia 01/22/2015    DDD (degenerative disc disease), lumbar     Malignant neoplasm of upper-outer quadrant of right breast in female, estrogen receptor negative ([address]     GERD (gastroesophageal reflux disease)       Active Hospital Problems    Diagnosis Date Noted    Metastatic breast cancer ([address] [C50.919] 11/30/2020    JAYNA (acute kidney injury) [facility] [N17.9] 11/30/2020    History of recurrent deep vein thrombosis (DVT) [Z86.718] 11/30/2020    Chronic anticoagulation [Z79.01] 11/30/2020    New onset seizure ([address] [R56.9]     Acute encephalopathy [G93.40]     HTN (hypertension), benign [I10]     SVT (supraventricular tachycardia) ([address] [I47.1] 11/29/2020       Mrs. Bernice Fuentes is a pleasant yet unfortunate 77year old female with a medical history significant for metastatic recurrent breast cancer with metastasis to her brain (invasive ductal), hypertension, hyperlipidemia, history of deep vein thrombosis on chronic anticoagulation, and anemia who presents from home with syncope and altered mental status. Problem List:  1. Supraventricular tachycardia. 2. Metastatic breast cancer with brain metastasis. Assessment and Plan:  1. Supraventricular tachycardia. Mrs. Michelet Clark is a pleasant 77year old female with a medical history significant for metastatic breast cancer, hypertension, and deep vein thrombosis who presented from home with syncope and dehydration. She was found to be in narrow complex tachycardia with short RP. Differential included AVNRT, AT (DVT), and AVRT. Based on EKG I believe AVNRT with V1 r' and II s'. Given her current medical status with metastatic breast cancer I think medical management would be most reasonable. I suspect SVT was triggered by dehydration. Would recommend metoprolol succinate over diltiazem and metoprolol tartrate. Teach vagal maneuvers. I will arrange for heart monitor as outpatient.    - Start on metoprolol succinate. - Improve hydration.  - Follow up as outpatient after cardiac monitor.  - We will sign off however remain available to assist in any way. 2. Metastatic breast cancer with brain metastasis. - Per primary team.    Thank you for allowing me to participate in the care of Mariama Win . If you have any questions/comments, please do not hesitate to contact us.     Almaz Arthur MD  Cardiac Electrophysiology  [address] [facility]  [phone] [facility]

## 2020-11-30 NOTE — CONSULTS
Right 2018    BREAST MASTECTOMY performed by Tom You MD at [address] [place] [address] INS IO LENS PROSTH W/O ECP Left 2018    PHACO EMULSIFICATION OF CATARACT WITH INTRAOCULAR LENS IMPLANT LEFT EYE performed by Saul Fu MD at [address] [place] [address] INS IO LENS PROSTH W/O ECP Right 10/11/2018    PHACO EMULSIFICATION OF CATARACT WITH  INTRAOCULAR LENS IMPLANT RIGHT EYE performed by Saul Fu MD at [facility] OR        Past Medical History:   Diagnosis Date    Abnormal CT scan, chest     Anemia 2015    Asthma     Axillary adenopathy     Breast cancer metastasized to bone [facility]) 2013    Stage IV adenocarcinoma    Breast mass 2013    Most likely metastatic right breast cancer    COPD [facility]ch[facility] obstructive [facility] disease) ([facility]     DDD (degenerative disc disease), lumbar     GERD (gastroesophageal reflux disease)     H/O: hysterectomy         Hemorrhoid     Removal in     Hepatitis A     Hilar adenopathy     Hot flashes     Hx of blood clots         Hyperlipidemia     Hypertension     Osteoarthritis     knees    Osteopenia     PE (pulmonary embolism) 1982    on BCP    Pneumonia     Pulmonary nodule      Social History     Tobacco Use    Smoking status: Former Smoker     Packs/day: 0.02     Years: 5.00     Pack years: 0.10     Types: Cigarettes     Last attempt to quit: 1999     Years since quittin.9    Smokeless tobacco: Never Used   Substance Use Topics    Alcohol use: No    Drug use: No     Allergies   Allergen Reactions    Codeine Other (See Comments)     Hallucinations     Current Facility-Administered Medications   Medication Dose Route Frequency Provider Last Rate Last Dose    sodium chloride flush 0.9 % injection 10 mL  10 mL Intravenous PRN Abad Madison MD        potassium chloride 10 mEq/100 mL IVPB (Peripheral Line)  10 mEq Intravenous PRN Abad Madison  mL/hr at 20 0728 10 mEq at 11/30/20 0728    magnesium sulfate 1 g in dextrose 5% 100 mL IVPB  1 g Intravenous PRN Vikram Reddy MD   Stopped at 11/30/20 0729    acetaminophen (TYLENOL) tablet 650 mg  650 mg Oral Q4H PRN Vikram Reddy MD        Or   [employer] acetaminophen (TYLENOL) suppository 650 mg  650 mg Rectal Q4H PRN Vikram Reddy MD        ondansetron [facility]) injection 4 mg  4 mg Intravenous Q6H PRN Vikram Reddy MD        fentaNYL (DURAGESIC) 100 MCG/HR 1 patch  1 patch Transdermal Q72H Vikram Reddy MD        pantoprazole (PROTONIX) tablet 40 mg  40 mg Oral QAM AC Vikram Reddy MD   40 mg at 11/30/20 0310    sertraline (ZOLOFT) tablet 100 mg  100 mg Oral Daily Vikram Reddy MD   100 mg at 11/30/20 8612    rivaroxaban (XARELTO) tablet 20 mg  20 mg Oral Daily with breakfast Vikram Reddy MD   20 mg at 11/30/20 0855    oxyCODONE-acetaminophen (PERCOCET) 5-325 MG per tablet 1 tablet  1 tablet Oral Q4H PRN Vkiram Reddy MD        metoprolol tartrate (LOPRESSOR) tablet 25 mg  25 mg Oral BID Vikram Reddy MD   25 mg at 11/30/20 0855    mupirocin (BACTROBAN) 2 % ointment   Nasal BID Rodger Bowser MD           ROS : A 10-14 system review of constitutional, cardiovascular, respiratory, eyes, musculoskeletal, endocrine, GI, ENT, skin, hematological, genitourinary, psychiatric and neurologic systems was obtained and updated today and is unremarkable except as mentioned in my HPI      Exam:     Constitutional:   Vitals:    11/30/20 0602 11/30/20 0705 11/30/20 0800 11/30/20 0848   BP: (!) 83/55 92/60 106/60    Pulse: 78 78 79    Resp: 21 19 20 20   Temp:   98.1 °F (36.7 °C)    TempSrc:       SpO2:   100% 100%   Weight:       Height:           General appearance:  Normal development and appear in no acute distress. Eye: No icterus. Fundus: Funduscopic examination cannot be performed due to COVID19 restrictions and precautions. Neck: supple  Cardiovascular: No lower leg edema with good pulsation. Mental Status:   Oriented to person, place, problem, and time. Memory: Poor immediate recall. Intact remote memory  Normal attention span and concentration. Language: intact naming, repeating and fluency   Poor fund of knowledge. Unaware of recent event. Cranial Nerves:   II: Visual fields: Full. Pupils: equal, round, reactive to light  III,IV,VI: Extra Ocular Movements are intact. No nystagmus  V: Facial sensation is intact   VII: Facial strength and movements: intact and symmetric  VIII: Hearing: Intact  IX: Palate elevation is symmetric  XI: Shoulder shrug is intact  XII: Tongue movements are normal  Musculoskeletal: 5/5 in all 4 extremities. Tone: Normal tone. Reflexes: 2+ in the upper extremity and 1+ in the lower extremity   Planters: flexor bilaterally. Coordination: no pronator drift, no dysmetria with FNF in upper extremities. Normal REM. Sensation: normal to all modalities in both arms and legs. Gait/Posture: Not tested due to seizure. Data:  LABS:   Lab Results   Component Value Date     11/30/2020    K 3.2 11/30/2020    K 4.1 06/14/2019    CL 94 11/30/2020    CO2 21 11/30/2020    BUN 31 11/30/2020    CREATININE 0.9 11/30/2020    GFRAA >60 11/30/2020    GFRAA >60 05/10/2013    LABGLOM >60 11/30/2020    GLUCOSE 117 11/30/2020    GLUCOSE 97 08/24/2017    PHOS 3.7 11/30/2020    MG 1.40 11/30/2020    CALCIUM 7.7 11/30/2020     Lab Results   Component Value Date    WBC 6.9 11/30/2020    RBC 3.85 11/30/2020    RBC 4.49 08/24/2017    HGB 9.5 11/30/2020    HCT 28.9 11/30/2020    MCV 75.1 11/30/2020    RDW 20.2 11/30/2020     11/30/2020     Lab Results   Component Value Date    INR 1.22 (H) 11/30/2020    PROTIME 14.2 (H) 11/30/2020       Neuroimaging were independently reviewed by myself and discussed results with the patient  Reviewed notes from different physicians  Reviewed lab and blood testing    Impression:  Acute encephalopathy and possible new onset seizure.   Now CT head concerning for possibility of brain metastasis which could be the reason for her seizure. Will need MRI brain with contrast in addition to EEG. Metastatic breast cancer  Hypertension  History of DVT and PE on DOAC  Hyponatremia  Anemia    Recommendation:  MRI brain with and without contrast  EEG  Seizure precautions  No need to start AED at this point unless MRI confirmed presence of brain metastasis  Continue Xarelto  Telemetry  PPI  Continue current blood pressure medications  Hydration  Discussed CT results with the patient  Monitor CBC and CMP  We will follow. MDM: High complexity. Thank you for referring such patient. If you have any questions regarding my consult note, please don't hesitate to call me. Sofy Alva MD  [phone]    This dictation was generated by voice recognition computer software.  Although all attempts are made to edit the dictation for accuracy, there may be errors in the  transcription that are not intended

## 2020-11-30 NOTE — CARE COORDINATION
CASE MANAGEMENT INITIAL ASSESSMENT      Reviewed chart and completed assessment with: patient  Explained Case Management role/services. Primary contact information: Shantal Mcfarlane [facility]    Health Care Decision Maker:   Who do you trust or have selected to make healthcare decisions for you? Name:   Shantal Gutierrez   Phone  Number:  [phone]  Can this person be reached and be able to respond quickly, such as within a few minutes or hours? Yes  Who would be your back-up decision maker? Name Denny   Phone Number: [phone]    Admit date/status: 11/30/20 Inpatient   Diagnosis: sepsis   Is this a Readmission?:  No      Insurance:Medicare   Precert required for SNF: No       3 night stay required: No    Living arrangements, Adls, care needs, prior to admission: lives alone in a house    Transportation: family      Durable Medical Equipment at home:  Walker_X_Cane_X_RTS__ BSC__Shower Chair__  02__ HHN__ CPAP__  BiPap__  Hospital Bed__ W/C___ Other__________    Services in the home and/or outpatient, prior to admission: none    Dialysis Facility (if applicable)   · Name:  · Address:  · Dialysis Schedule:  · Phone:  · Fax:    PT/OT recs: none    Hospital Exemption Notification (HEN): not initiated     Barriers to discharge: none    Plan/comments: Patient is independent at home with the exception of driving. Will continue to follow should any needs arise.       ECOC on chart for MD signature

## 2020-11-30 NOTE — ED NOTES
Pt. Currently sleeping at this time. Breathing easy and unlabored. Skin pwd. VS recollected at this time.      Aida Franz RN  11/29/20 2036

## 2020-11-30 NOTE — ED NOTES
Pt.'s Family notified of room assignment at [facility].      Mariaelena Galvez RN  11/29/20 8073

## 2020-11-30 NOTE — ED NOTES
2303- call placed to [facility] for updated ETA on pt. Went to [facility], left  for [facility] to call [place] [phone]. [facility] back. Payam Kat  11/29/20 2312    2323- call Tomi Carrasquillo EMT employee with [address], that arranged transport with [facility] originally. Nga Jasmin states that [facility] forgot about this run to take this patient [facility] and booked another run in it's place and now they are close and cannot take patient to [facility]. 2324- Call placed to [facility] for transport, brenna will call back with ETA. Payam Kat  11/29/20 2330      Bianka 72 returned call and accepted the run.  ETA 1 hour; @ 0050     Payam Kat  11/29/20 6444

## 2020-11-30 NOTE — ED NOTES
Still waiting on transport at this time. [facility] forgot about run and did different one and now unavailable. Waiting to hear back from [facility] at this time.      Carl Starks RN  11/29/20 9632

## 2020-12-01 NOTE — ONCOLOGY
ONCOLOGY HEMATOLOGY CARE PROGRESS NOTE      SUBJECTIVE:     Afebrile and on room air. ROS:   The remaining 10 point review of symptoms is unremarkable. OBJECTIVE        Physical    VITALS:  /70   Pulse 63   Temp 98.2 °F (36.8 °C) (Oral)   Resp 21   Ht 5' 2\" (1.575 m)   Wt 149 lb 4 oz (67.7 kg)   SpO2 96%   BMI 27.30 kg/m²   TEMPERATURE:  Current - Temp: 98.2 °F (36.8 °C); Max - Temp  Av.4 °F (36.9 °C)  Min: 98.1 °F (36.7 °C)  Max: 98.7 °F (37.1 °C)  PULSE OXIMETRY RANGE: SpO2  Av %  Min: 96 %  Max: 100 %  24HR INTAKE/OUTPUT:      Intake/Output Summary (Last 24 hours) at 2020 0612  Last data filed at 2020 0550  Gross per 24 hour   Intake 730 ml   Output 1460 ml   Net -730 ml       CONSTITUTIONAL:  awake, alert, cooperative, no apparent distress, HEENT oral pharynx , no scleral icterus  HEMATOLOGIC/LYMPHATICS:  no cervical lymphadenopathy, no supraclavicular lymphadenopathy, no axillary lymphadenopathy and no inguinal lymphadenopathy  LUNGS:  No increased work of breathing, good air exchange, clear to auscultation bilaterally, no crackles or wheezing  CARDIOVASCULAR:  , regular rate and rhythm, normal S1 and S2, no S3 or S4, and no murmur noted  ABDOMEN:  No scars, normal bowel sounds, soft, non-distended, non-tender, no masses palpated, no hepatosplenomegally  MUSCULOSKELETAL:  There is no redness, warmth, or swelling of the joints. EXTREMETIES: No clubbing cynosis or edema  NEUROLOGIC:  Awake, alert, oriented to name, place and time. Cranial nerves II-XII are grossly intact. Motor is 5 out of 5 bilaterally.    SKIN:  no bruising or bleeding      Data      Recent Labs     20  1615 20  0235 20  0431   WBC 20.5* 6.9 3.6*   HGB 11.5* 9.5* 8.6*   HCT 36.3 28.9* 26.1*    219 201   MCV 76.9* 75.1* 75.9*        Recent Labs     20  1615 20  0235 20  2232 20  0431   * 128*  --  132*   K 3.6 3.2* 4.1 4.2   CL 95* 94*  --  102   CO2 17* 21  --  20*   PHOS  --  3.7  --  2.4*   BUN 36* 31*  --  22*   CREATININE 1.2 0.9  --  0.8     Recent Labs     11/30/20  0235 12/01/20  0431   AST 45* 33   ALT 17 13   BILITOT 0.5 0.3   ALKPHOS 103 87       Magnesium:    Lab Results   Component Value Date    MG 2.10 12/01/2020    MG 1.40 11/30/2020         Problem List  Patient Active Problem List   Diagnosis    GERD (gastroesophageal reflux disease)    Malignant neoplasm of upper-outer quadrant of right breast in female, estrogen receptor negative ([address]    DDD (degenerative disc disease), lumbar    Anemia    Estrogen receptor negative    Acute deep vein thrombosis (DVT) of distal end of right lower extremity ([address]    Essential hypertension    Mixed hyperlipidemia    Generalized anxiety disorder    Primary osteoarthritis involving multiple joints    Secondary malignant neoplasm of bone ([address]    Breast cancer in female ([address]    SOB (shortness of breath)    History of pulmonary embolus (PE)    SVT (supraventricular tachycardia) (HCC)    Metastatic breast cancer ([address]    JAYNA (acute kidney injury) ([address]    History of recurrent deep vein thrombosis (DVT)    Chronic anticoagulation    New onset seizure ([address]    Acute encephalopathy    HTN (hypertension), benign       ASSESSMENT AND PLAN    1.) Triple neg breast cancer  - Currently receiving 3rd line Eribulin. Due on 12/02/2020 for cycle 28, day 8 chemo. Will hold. - CT chest, 10/13/2020, was stable. - Updated CTPA, CT abd/pelvis this admission were both stable.     2.) Brain mets  - CT head, 11/29/2020, showed extensive brain mets. - Will start Dex. - Defer Keppra to Neuro for now. - Can proceed with RT as an outpatient.     3.) RLE DVT  - Diagnosed on 11/15/2016. Thank you for asking me to see the patient.      4.) SVT  - Defer to cardiology.  - HR 68 on monitor currently.     5.) Constipation  - Start Senokot-S.    ONCOLOGIC DISPOSITION:  After Michelle Rico MD  Please contact through [employer]

## 2020-12-01 NOTE — PROCEDURES
Patient: Dima Ortez    MR Number: [id number]  YOB: 1945  Date of Visit: 12/1/2020    Clinical History:  The patient is a 77y.o. years old female with new onset seizure. Medications reviewed. Method: The EEG was performed utilizing the international 10/20 of electrode placements of both referential and bipolar montages. The patient was awake and drowsy through out the recording. Photic stimulation was performed. Findings: The background of the EEG showed normal alpha posterior background of 8-9  HZ and amplitude of 20-40 UV. This background was symmetric, waxing and waning, and reactive with eye opening and closure. As the patient became drowsy, generalized diffuse slowing was seen through recording at 6-7 HZ. This generalized slowing was symmetric, non rhythmical, and continuous. No spike or sharp waves were seen. Photic stimulation did not activate EEG. Impression: This EEG  is within normal limits. There is no evidence of epileptiform discharges, focal, or lateralizing abnormalities.       Alyssa Pierce MD      Board certified in clinical neurophysiology

## 2020-12-01 NOTE — PROGRESS NOTES
Hospitalist Progress Note      PCP: Jarrell Villaseñor MD    Date of Admission: 11/30/2020    Chief Complaint: Found down    Subjective: no new c/o. Medications:  Reviewed    Infusion Medications   Scheduled Medications    sennosides-docusate sodium  2 tablet Oral Daily    fentaNYL  1 patch Transdermal Q72H    pantoprazole  40 mg Oral QAM AC    sertraline  100 mg Oral Daily    rivaroxaban  20 mg Oral Daily with breakfast    mupirocin   Nasal BID    metoprolol succinate  25 mg Oral Daily    dexamethasone  4 mg Intravenous Q6H     PRN Meds: sodium chloride flush, potassium chloride, magnesium sulfate, acetaminophen **OR** acetaminophen, ondansetron, oxyCODONE-acetaminophen      Intake/Output Summary (Last 24 hours) at 12/1/2020 0959  Last data filed at 12/1/2020 0550  Gross per 24 hour   Intake 480 ml   Output 1125 ml   Net -645 ml       Physical Exam Performed:    BP (!) 104/44   Pulse 70   Temp 97.1 °F (36.2 °C) (Oral)   Resp 18   Ht 5' 2\" (1.575 m)   Wt 149 lb 4 oz (67.7 kg)   SpO2 96%   BMI 27.30 kg/m²     General appearance: No apparent distress, appears stated age and cooperative. HEENT: Pupils equal, round, and reactive to light. Conjunctivae/corneas clear. Neck: Supple, with full range of motion. No jugular venous distention. Trachea midline. Respiratory:  Normal respiratory effort. Clear to auscultation, bilaterally without Rales/Wheezes/Rhonchi. Cardiovascular: Regular rate and rhythm with normal S1/S2 without murmurs, rubs or gallops. Abdomen: Soft, non-tender, non-distended with normal bowel sounds. Musculoskeletal: No clubbing, cyanosis or edema bilaterally. Full range of motion without deformity. Skin: Skin color, texture, turgor normal.  No rashes or lesions. Neurologic:  Neurovascularly intact without any focal sensory/motor deficits.  Cranial nerves: II-XII intact, grossly non-focal.  Psychiatric: Alert and oriented, thought content appropriate, normal insight  Capillary Refill: Brisk,< 3 seconds   Peripheral Pulses: +2 palpable, equal bilaterally       Labs:   Recent Labs     11/29/20  1615 11/30/20  0235 12/01/20  0431   WBC 20.5* 6.9 3.6*   HGB 11.5* 9.5* 8.6*   HCT 36.3 28.9* 26.1*    219 201     Recent Labs     11/29/20  1615 11/30/20  0235 11/30/20  2232 12/01/20  0431   * 128*  --  132*   K 3.6 3.2* 4.1 4.2   CL 95* 94*  --  102   CO2 17* 21  --  20*   BUN 36* 31*  --  22*   CREATININE 1.2 0.9  --  0.8   CALCIUM 9.0 7.7*  --  7.5*   PHOS  --  3.7  --  2.4*     Recent Labs     11/30/20  0235 12/01/20  0431   AST 45* 33   ALT 17 13   BILITOT 0.5 0.3   ALKPHOS 103 87     Recent Labs     11/30/20  0235 12/01/20  0431   INR 1.22* 1.57*     Recent Labs     11/29/20  1615 11/29/20  1842   CKTOTAL 1,022*  --    TROPONINI 0.07* 0.10*       Urinalysis:      Lab Results   Component Value Date    NITRU Negative 11/29/2020    WBCUA 0-2 11/29/2020    BACTERIA Rare 11/29/2020    RBCUA 0-2 11/29/2020    BLOODU TRACE-INTACT 11/29/2020    SPECGRAV 1.025 11/29/2020    GLUCOSEU Negative 11/29/2020       Consults:    IP CONSULT TO HEM/ONC  IP CONSULT TO PALLIATIVE CARE  IP CONSULT TO NEUROLOGY      Assessment/Plan:    Active Hospital Problems    Diagnosis    Metastatic [facility] cancer ([facility] [C50.919]    [facility] [N17.9]    History of recurrent deep vein thrombosis (DVT) [Z86.718]    Chronic anticoagulation [Z79.01]    New onset seizure ([facility] [R56.9]    Acute encephalopathy [G93.40]    HTN (hypertension), benign [I10]    SVT (supraventricular tachycardia) (HCC) [I47.1]         Stage IV Breast CA - (invasive ductal, triple negative) w/ known lung and bony metastases. Now with newly discovered brain lesions and new onset seizure. MRI brain ordered and c/w metastatic disease - discussed w/ patient and son, present at bedside. Neurology consulted and appreciated in advance.  Heme/Onc consulted and appreciated w/ finding of metastatic brain

## 2020-12-01 NOTE — PROGRESS NOTES
mL  10 mL Intravenous PRN Brandin Branch MD   10 mL at 12/01/20 0902    potassium chloride 10 mEq/100 mL IVPB (Peripheral Line)  10 mEq Intravenous PRN Brandin Branch  mL/hr at 11/30/20 0957 10 mEq at 11/30/20 0957    magnesium sulfate 1 g in dextrose 5% 100 mL IVPB  1 g Intravenous PRN Brandin Branch MD   Stopped at 11/30/20 0729    acetaminophen (TYLENOL) tablet 650 mg  650 mg Oral Q4H PRN Brandin Branch MD        Or   [facility] See acetaminophen (TYLENOL) suppository 650 mg  650 mg Rectal Q4H PRN Brandin Branch MD        ondansetron [facility]) injection 4 mg  4 mg Intravenous Q6H PRN Brandin Branch MD        fentaNYL (DURAGESIC) 100 MCG/HR 1 patch  1 patch Transdermal Q72H Brandin Branch MD        pantoprazole (PROTONIX) tablet 40 mg  40 mg Oral QAM AC Brandin Branch MD   40 mg at 12/01/20 0734    sertraline (ZOLOFT) tablet 100 mg  100 mg Oral Daily Brandin Branch MD   100 mg at 12/01/20 0902    rivaroxaban (XARELTO) tablet 20 mg  20 mg Oral Daily with breakfast Brandin Branch MD   20 mg at 12/01/20 0902    oxyCODONE-acetaminophen (PERCOCET) 5-325 MG per tablet 1 tablet  1 tablet Oral Q4H PRN Brandin Branch MD   1 tablet at 11/30/20 1948    mupirocin (BACTROBAN) 2 % ointment   Nasal BID Marlen Sotelo MD        metoprolol succinate (TOPROL XL) extended release tablet 25 mg  25 mg Oral Daily JASON Rios MD   25 mg at 12/01/20 0901    dexamethasone (DECADRON) injection 4 mg  4 mg Intravenous Q6H Rick Santillan MD   4 mg at 12/01/20 1403     Allergies   Allergen Reactions    Codeine Other (See Comments)     Hallucinations      reports that she quit smoking about 21 years ago. Her smoking use included cigarettes. She has a 0.10 pack-year smoking history. She has never used smokeless tobacco. She reports that she does not drink alcohol or use drugs.        Objective:  Exam:   Constitutional:   Vitals:    12/01/20 1000 12/01/20 1100 12/01/20 1200 12/01/20 1402   BP: 112/60 104/71 111/70 120/64   Pulse: 75 73 75 79   Resp:   18    Temp:       TempSrc:       SpO2:       Weight:       Height:         General appearance:  Normal development and appear in no acute distress. Eye: No icterus. Neck: supple  Cardiovascular:  No lower leg edema with good pulsation. Mental Status:   Oriented to person, place, problem, and time. Memory: Good immediate recall. Intact remote memory  Normal attention span and concentration. Language: intact naming, repeating and fluency   Good fund of Knowledge. Cranial Nerves:   II: Visual fields: Full. Pupils: equal, round, reactive to light  III,IV,VI: Extra Ocular Movements are intact. No nystagmus  V: Facial sensation is intact  VII: Facial strength and movements: intact and symmetric  IX: Palate elevation is symmetric  XI: Shoulder shrug is intact  XII: Tongue movements are normal  Musculoskeletal: 5/5 in all 4 extremities. Tone: Normal tone. Reflexes: Symmetric 2+ in both arms and legs. Coordination: no pronator drift, no dysmetria with FNF. Normal REM. Sensation: normal to all modalities in both arms and legs.       Data:  LABS:   Lab Results   Component Value Date     12/01/2020    K 4.2 12/01/2020    K 4.1 06/14/2019     12/01/2020    CO2 20 12/01/2020    BUN 22 12/01/2020    CREATININE 0.8 12/01/2020    GFRAA >60 12/01/2020    GFRAA >60 05/10/2013    LABGLOM >60 12/01/2020    GLUCOSE 137 12/01/2020    GLUCOSE 97 08/24/2017    PHOS 2.4 12/01/2020    MG 2.10 12/01/2020    CALCIUM 7.5 12/01/2020     Lab Results   Component Value Date    WBC 3.6 12/01/2020    RBC 3.44 12/01/2020    RBC 4.49 08/24/2017    HGB 8.6 12/01/2020    HCT 26.1 12/01/2020    MCV 75.9 12/01/2020    RDW 20.6 12/01/2020     12/01/2020     Lab Results   Component Value Date    INR 1.57 (H) 12/01/2020    PROTIME 18.3 (H) 12/01/2020       Neuroimaging and EEG were independently reviewed by me and discussed results with the patient  I reviewed blood testing and other test results and discussed results with the patient      Impression:  Acute encephalopathy and new onset seizures secondary to brain metastasis, severe  Metastatic breast cancer  History of DVT and PE  Anemia  Hypertension      Recommendation  Start Keppra 500 mg twice daily for now  Side effect was discussed in details  She does not drive. Discussed seizure precaution including falling, injury and risk of status epilepticus. PT and OT  Telemetry  Continue DOAC  Continue Decadron  PPI  Insulin sliding scale blood sugar monitor  Follow-up with oncology and radiation oncology regarding her brain metastasis  Continue current blood pressure medications  Can be discharged when medically stable  Follow-up with me PRN for now  DW patient and family               Galina Kaplan MD   [phone]      This dictation was generated by voice recognition computer software. Although all attempts are made to edit the dictation for accuracy, there may be errors in the transcription that are not intended.

## 2020-12-02 NOTE — DISCHARGE INSTR - COC
Continuity of Care Form    Patient Name: Governor Aishwarya   :    MRN:  [id number]    Admit date:  2020  Discharge date:  2 Dec 2020    Code Status Order: Full Code   Advance Directives:      Admitting Physician:  Annabel Kam MD  PCP: Rae Nuñez MD    Discharging Nurse: [facility] Unit/Room#: 0207/0207-01  Discharging Unit Phone Number: ***    Emergency Contact:   Extended Emergency Contact Information  Primary Emergency Contact: [address] Phone: [phone]  Mobile Phone: [phone]  Relation: Child  Secondary Emergency Contact: Norm Canales (POA)  Address: [address] Phone: [phone]  Mobile Phone: [phone]  Relation: Child    Past Surgical History:  Past Surgical History:   Procedure Laterality Date    APPENDECTOMY      BREAST SURGERY      CARPAL TUNNEL RELEASE      B/L hands   [address]    COLONOSCOPY  2004    ENDOSCOPY, COLON, DIAGNOSTIC      EYE SURGERY Right 10/11/2018    PHACO EMULSIFICATION OF CATARACT WITH  INTRAOCULAR LENS IMPLANT RIGHT EYE     HEMORRHOID SURGERY      HYSTERECTOMY  1984    INSERTION / REMOVAL / REPLACEMENT VENOUS ACCESS CATHETER Left 2019    PORT INSERTION performed by Rehan Chapa MD at [place] [id number] Right 2018    BREAST MASTECTOMY performed by Rehan Chapa MD at [address] [place] [address] IO LENS PROSTH W/O ECP Left 2018    PHACO EMULSIFICATION OF CATARACT WITH INTRAOCULAR LENS IMPLANT LEFT EYE performed by Jesse Herrera MD at [address] [place] [address] IO LENS PROSTH W/O ECP Right 10/11/2018    PHACO EMULSIFICATION OF CATARACT WITH  INTRAOCULAR LENS IMPLANT RIGHT EYE performed by Jesse Herrera MD at [address] [place] [address] OR       Immunization History:   Immunization History   Administered Date(s) Administered    Influenza Vaccine, unspecified change in H&P    PHYSICIAN SIGNATURE:  Electronically signed by Tresa Guevara MD on 12/2/20 at 3:00 PM EST

## 2020-12-02 NOTE — CARE COORDINATION
General acute hospital    Referral received from  to follow for home care services. I will follow for needs, and speak with patient to verify demos.     Giovani Uribe RN, BSN CTN  General acute hospital [phone]

## 2020-12-02 NOTE — PLAN OF CARE
Problem: Falls - Risk of:  Goal: Will remain free from falls  Description: Will remain free from falls  12/2/2020 0107 by Aneta Rubinstein, RN  Outcome: Ongoing     Problem: Falls - Risk of:  Goal: Absence of physical injury  Description: Absence of physical injury  12/2/2020 0107 by Aneta Rubinstein, RN  Outcome: Ongoing

## 2020-12-02 NOTE — PROGRESS NOTES
Hospitalist Progress Note      PCP: Mateo Carpenter MD    Date of Admission: 11/30/2020    Chief Complaint: Found down    Subjective: no new c/o. Medications:  Reviewed    Infusion Medications   Scheduled Medications    sennosides-docusate sodium  2 tablet Oral Daily    levETIRAcetam  500 mg Oral BID    pantoprazole  40 mg Oral QAM AC    sertraline  100 mg Oral Daily    rivaroxaban  20 mg Oral Daily with breakfast    metoprolol succinate  25 mg Oral Daily    dexamethasone  4 mg Intravenous Q6H     PRN Meds: sodium chloride flush, potassium chloride, magnesium sulfate, acetaminophen **OR** acetaminophen, ondansetron, oxyCODONE-acetaminophen      Intake/Output Summary (Last 24 hours) at 12/2/2020 0935  Last data filed at 12/2/2020 9934  Gross per 24 hour   Intake 240 ml   Output 680 ml   Net -440 ml       Physical Exam Performed:    /63   Pulse 60   Temp 98 °F (36.7 °C) (Oral)   Resp 17   Ht 5' 2\" (1.575 m)   Wt 141 lb 11.2 oz (64.3 kg)   SpO2 99%   BMI 25.92 kg/m²     General appearance: No apparent distress, appears stated age and cooperative. HEENT: Pupils equal, round, and reactive to light. Conjunctivae/corneas clear. Neck: Supple, with full range of motion. No jugular venous distention. Trachea midline. Respiratory:  Normal respiratory effort. Clear to auscultation, bilaterally without Rales/Wheezes/Rhonchi. Cardiovascular: Regular rate and rhythm with normal S1/S2 without murmurs, rubs or gallops. Abdomen: Soft, non-tender, non-distended with normal bowel sounds. Musculoskeletal: No clubbing, cyanosis or edema bilaterally. Full range of motion without deformity. Skin: Skin color, texture, turgor normal.  No rashes or lesions. Neurologic:  Neurovascularly intact without any focal sensory/motor deficits.  Cranial nerves: II-XII intact, grossly non-focal.  Psychiatric: Alert and oriented, thought content appropriate, normal insight  Capillary Refill: Brisk,< 3 seconds Peripheral Pulses: +2 palpable, equal bilaterally       Labs:   Recent Labs     11/30/20  0235 12/01/20  0431 12/02/20  0405   WBC 6.9 3.6* 3.6*   HGB 9.5* 8.6* 8.4*   HCT 28.9* 26.1* 26.0*    201 220     Recent Labs     11/30/20  0235 11/30/20  2232 12/01/20  0431 12/02/20  0405   *  --  132* 134*   K 3.2* 4.1 4.2 4.1   CL 94*  --  102 103   CO2 21  --  20* 21   BUN 31*  --  22* 27*   CREATININE 0.9  --  0.8 0.8   CALCIUM 7.7*  --  7.5* 7.9*   PHOS 3.7  --  2.4* 1.7*     Recent Labs     11/30/20  0235 12/01/20  0431 12/02/20  0405   AST 45* 33 28   ALT 17 13 17   BILITOT 0.5 0.3 0.3   ALKPHOS 103 87 86     Recent Labs     11/30/20  0235 12/01/20  0431 12/02/20  0405   INR 1.22* 1.57* 1.77*     Recent Labs     11/29/20  1615 11/29/20  1842   CKTOTAL 1,022*  --    TROPONINI 0.07* 0.10*       Urinalysis:      Lab Results   Component Value Date    NITRU Negative 11/29/2020    WBCUA 0-2 11/29/2020    BACTERIA Rare 11/29/2020    RBCUA 0-2 11/29/2020    BLOODU TRACE-INTACT 11/29/2020    SPECGRAV 1.025 11/29/2020    GLUCOSEU Negative 11/29/2020       Consults:    IP CONSULT TO HEM/ONC  IP CONSULT TO PALLIATIVE CARE  IP CONSULT TO NEUROLOGY      Assessment/Plan:    Active Hospital Problems    Diagnosis    Metastasis to brain ([facility] [C79.31]    Metastatic breast cancer ([facility] [C50.919]    [facility] [N17.9]    History of recurrent deep vein thrombosis (DVT) [Z86.718]    Chronic anticoagulation [Z79.01]    New onset seizure ([facility] [R56.9]    Acute encephalopathy [G93.40]    HTN (hypertension), benign [I10]    SVT (supraventricular tachycardia) (HCC) [I47.1]         Stage IV Breast CA - (invasive ductal, triple negative) w/ known lung and bony metastases. Now with newly discovered brain lesions and new onset seizure. MRI brain ordered and c/w metastatic disease - discussed w/ patient and son, present at bedside. Neurology consulted and appreciated - started on antiepileptic. Heme/Onc consulted and appreciated w/ finding of metastatic brain lesions - now on Decadron. Plan for outpt Whole Brain Radiation Therapy. Encephalopathy - acute metabolic, 2nd to above. Will continue to follow clinical response w/ supportive care PRN. Fall - Unclear why patient fell although she says she tripped on a cat. To medics and to her physician at [place]. Or[place] she appeared quite ill. Much of her lab was also quite abnormal.    No evidence of an infectious process. Check procalcitonin. No empiric antibiotics will be given at this time.       SVT - Converted at [place]. [place] w/ adenosine. Subsequently on Dilt gtt - now stopped. Started PO Metoprolol      HTN - w/out known CAD and no evidence of active signs/sxs of ischemia/failure. Currently controlled on home meds w/ vitals reviewed and documented as above. ARF - w/ elevated BUN/Cr ratio c/w pre-renal azotemia. Given IVF hydration and follow serial labs. Reviewed and documented as above. Hx DVT - anticoagulated at baseline on home Xarelto - continued.      DVT Prophylaxis: Xarelto/IPC  Diet: DIET GENERAL;  Code Status: Full Code Palliative care consulted and appreciated. PT/OT Eval Status: not yet ordered. Dispo - Likely to home Wed/Thurs 2/3 Dec at the earliest pending clinical course and subspecialty recs.      Casimer Fabry, MD

## 2020-12-02 NOTE — CONSULTS
Palliative Care Initial Note  Palliative Care Admit date:  23/3/2020  Reason for c/s:  GOC, Code status, Sx mgt    Advance Directives:  Pt reports having executed AD's but does not remember which son she indicated as her [de-identified]. In the absence of a HCPOA paperwork, pt is agreeable to our calling whichever son is available. Pt currently has a full code status. 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 She was clear of her wish to forego resuscitative interventions and go home w/ an amended code status (to [facility]). Plan of care/goals:  Pt is aware of new findings and has apparently agreed to whole brain xrt. She verb'd being hopeful \"it will slow down the growth,\" yet added, \"they told me I could go at any time. \"   Saira Gamble is eager to return home and her DIL, Cinda, is en route to get her. Also d/w pt the opportunity to have community palliative care involved to assist w/ any sx mgt and, when timing is right, assist w/ a transition to comfort care. Spoke w/ son, Chance Bowens, via phone and he was very appreciative and receptive. He is in agreement w/ the change to pts code status and he welcomes the support of [address] to assist w/ ongoing ACP. Denny shared his assessment that pt \"has gone downhill in the last six months, physically and mentally. \"   He isn't sure if she is her safest @ home and understands there will come a point where pt will either require alternate placement or have someone w/ her in her home. Social/Spiritual:  Denny agree's that having community palliative care involved will be an asset to ensuring pts sx needs are met and that they can help transition her care when the time comes. Pt reports having a neighbor who \"comes over and checks on me before she goes to work in the mornings and when she comes home in the evenings. \"      Plan:  Hospitalist signed the [place] DNR, as did pt, and she has been two copies for home use; notified Denny so that he could place copy of dnr \"on the front of my fridge. \"   [facility]    Palliative Performance Scale:   [] 60%  Amb reduced; Sig dz. Can't do hobbies/housework; Intake normal or reduced, Occasional assist; LOC full/confusion   [] 50%  Mainly sit/lie; Extensive disease. Mainly assist, Intake normal or reduced; Occasional assist; LOC full/confusion   [x] 40%  Mainly in bed; Extensive disease; Mainly assist; Intake normal or reduced; Occasional assist; LOC full/confusion (confused in evening)   [] 30%  Bed bound, Extensive disease; Total care; Intake reduced; LOC full/confusion   [] 20%  Bed bound; Extensive disease; Total care; Intake minimal; Drowsy/coma   [] 10%  Bed bound; Extensive disease;  Total care; Mouth care only; Drowsy/coma   []  0%   Death     Last doc'd stool:          Reason for consult:  _X_ Advance Care Planning  ___ Transition of Care Planning  ___ Psychosocial/Spiritual Support  ___ Symptom Management                                                                                                Isaak Anne RN

## 2020-12-02 NOTE — PROGRESS NOTES
Discharge instructions and medications reviewed with patient and family at bedside. IV and Tele discontinued, patient has no questions at this time. New medication prescription instructions explained in full. All belongings accounted for. Discharge packet copy sheet signed and placed in chart. Pt wheeled out front lobby by PCA. [id number] notified.  Electronically signed by Saira Jacobo RN on 12/2/2020 at 4:30 PM

## 2020-12-02 NOTE — PROGRESS NOTES
Lexy Yeager  Neurology Follow-up  [facility] Neurology    Date of Service: 12/2/2020    Subjective:   CC: Follow up today regarding: Acute encephalopathy and new onset seizure. Brain metastasis. Events noted. Chart and lab reviewed. The patient denies any new symptoms today. No side effect from Keppra. No severe headache, focal weakness or numbness or chest pain. Awaiting discharge home. Other review of system was unremarkable. ROS : A 10-12 system review obtained and updated today and is unremarkable except as mentioned  in my interval history. family history includes Arthritis in her mother and sister; Asthma in her mother; Cancer in her brother and sister; Early Death in her father; Stroke in her mother.     Past Medical History:   Diagnosis Date    Abnormal CT scan, chest     Anemia 1/22/2015    Asthma     Axillary adenopathy     Breast cancer metastasized to bone [facility]) 2013    Stage IV adenocarcinoma    Breast mass 2013    Most likely metastatic right breast cancer    COPD (chronic obstructive pulmonary disease) (HCC)     DDD (degenerative disc disease), lumbar     GERD (gastroesophageal reflux disease)     H/O: hysterectomy     1984    Hemorrhoid     Removal in 1989    Hepatitis A 2011    Hilar adenopathy     Hot flashes     Hx of blood clots     1982    Hyperlipidemia     Hypertension     Osteoarthritis     knees    Osteopenia 2012    PE (pulmonary embolism) 1982    on BCP    Pneumonia     Pulmonary nodule      Current Facility-Administered Medications   Medication Dose Route Frequency Provider Last Rate Last Dose    sennosides-docusate sodium (SENOKOT-S) 8.6-50 MG tablet 2 tablet  2 tablet Oral Daily Jene Simmonds, MD   2 tablet at 12/02/20 0811    levETIRAcetam (KEPPRA) tablet 500 mg  500 mg Oral BID Levi Moody MD   500 mg at 12/02/20 0811    sodium chloride flush 0.9 % injection 10 mL  10 mL Intravenous PRN Gopi Aguilar MD   10 mL at 12/02/20 0811    potassium chloride 10 mEq/100 mL IVPB (Peripheral Line)  10 mEq Intravenous PRN Wendy Latif  mL/hr at 11/30/20 0957 10 mEq at 11/30/20 0957    magnesium sulfate 1 g in dextrose 5% 100 mL IVPB  1 g Intravenous PRN Wendy Latif MD   Stopped at 11/30/20 0729    acetaminophen (TYLENOL) tablet 650 mg  650 mg Oral Q4H PRN Wendy Latif MD        Or   [place] acetaminophen (TYLENOL) suppository 650 mg  650 mg Rectal Q4H PRN Wendy Latif MD        ondansetron [facility]) injection 4 mg  4 mg Intravenous Q6H PRN Wendy Latif MD        pantoprazole (PROTONIX) tablet 40 mg  40 mg Oral QAM AC Wendy Latif MD   40 mg at 12/02/20 5885    sertraline (ZOLOFT) tablet 100 mg  100 mg Oral Daily Wendy Latif MD   100 mg at 12/01/20 0902    rivaroxaban (XARELTO) tablet 20 mg  20 mg Oral Daily with breakfast Wendy Latif MD   20 mg at 12/02/20 0811    oxyCODONE-acetaminophen (PERCOCET) 5-325 MG per tablet 1 tablet  1 tablet Oral Q4H PRN Wendy Latif MD   1 tablet at 11/30/20 1948    metoprolol succinate (TOPROL XL) extended release tablet 25 mg  25 mg Oral Daily JASON Jenkins MD   25 mg at 12/02/20 0811    dexamethasone (DECADRON) injection 4 mg  4 mg Intravenous Q6H Sreekanth Wyman MD   4 mg at 12/02/20 2439     Allergies   Allergen Reactions    Codeine Other (See Comments)     Hallucinations      reports that she quit smoking about 21 years ago. Her smoking use included cigarettes. She has a 0.10 pack-year smoking history. She has never used smokeless tobacco. She reports that she does not drink alcohol or use drugs.        Objective:  Exam:   Constitutional:   Vitals:    12/01/20 2328 12/02/20 0338 12/02/20 0745 12/02/20 1103   BP: 110/65 117/61 114/63 135/84   Pulse: 71 70 60 62   Resp: 16 18 17 16   Temp: 98.6 °F (37 °C) 98.3 °F (36.8 °C) 98 °F (36.7 °C) 98.1 °F (36.7 °C)   TempSrc: Oral Oral Oral Oral   SpO2: 95% 97% 99% 98%   Weight:  141 lb 11.2 oz (64.3 kg)     Height:         General appearance:  Normal development and appear in no acute distress. Eye: No icterus. Neck: supple  Cardiovascular:  No lower leg edema with good pulsation. Mental Status:   Oriented to person, place, problem, and time. Memory: Good immediate recall. Intact remote memory  Normal attention span and concentration. Language: intact naming, repeating and fluency   Good fund of Knowledge. Cranial Nerves:   Pupil reactive and symmetric  Face is symmetric  Normal sensation in the face  Extraocular muscle intact  Tongue is midline  Musculoskeletal: 5/5 in all 4 extremities. Tone: Normal tone. Gait steady  Coordination: no pronator drift, no dysmetria with FNF. Normal REM. Sensation: normal to all modalities in both arms and legs.       Data:  LABS:   Lab Results   Component Value Date     12/02/2020    K 4.1 12/02/2020    K 4.1 06/14/2019     12/02/2020    CO2 21 12/02/2020    BUN 27 12/02/2020    CREATININE 0.8 12/02/2020    GFRAA >60 12/02/2020    GFRAA >60 05/10/2013    LABGLOM >60 12/02/2020    GLUCOSE 168 12/02/2020    GLUCOSE 97 08/24/2017    PHOS 1.7 12/02/2020    MG 1.90 12/02/2020    CALCIUM 7.9 12/02/2020     Lab Results   Component Value Date    WBC 3.6 12/02/2020    RBC 3.40 12/02/2020    RBC 4.49 08/24/2017    HGB 8.4 12/02/2020    HCT 26.0 12/02/2020    MCV 76.4 12/02/2020    RDW 20.7 12/02/2020     12/02/2020     Lab Results   Component Value Date    INR 1.77 (H) 12/02/2020    PROTIME 20.6 (H) 12/02/2020         I reviewed blood testing and other test results and discussed results with the patient      Impression: No change  Acute encephalopathy and new onset seizures secondary to brain metastasis, severe  Metastatic breast cancer  History of DVT and PE  Anemia  Hypertension      Recommendation      Continue Keppra the same dose 500 mg twice daily  Seizure precautions  Discussed today including side effect from Keppra  Discussed risk of

## 2020-12-02 NOTE — PROGRESS NOTES
End of shift report given to [facility]. Call light within reach, bed in lowest position, no needs at this time.

## 2020-12-02 NOTE — PROGRESS NOTES
Kassandra Montalvo MD wanted event monitor ordered discontinued due to patient's confusion at night and inability to wear monitor.

## 2020-12-02 NOTE — ONCOLOGY
ONCOLOGY HEMATOLOGY CARE PROGRESS NOTE      SUBJECTIVE:     Afebrile and on room air. +BM x 2 yesterday. Hopes to go home. ROS:   The remaining 10 point review of symptoms is unremarkable. OBJECTIVE        Physical    VITALS:  /61   Pulse 70   Temp 98.3 °F (36.8 °C) (Oral)   Resp 18   Ht 5' 2\" (1.575 m)   Wt 141 lb 11.2 oz (64.3 kg)   SpO2 97%   BMI 25.92 kg/m²   TEMPERATURE:  Current - Temp: 98.3 °F (36.8 °C); Max - Temp  Av.2 °F (36.8 °C)  Min: 97.1 °F (36.2 °C)  Max: 99.1 °F (37.3 °C)  PULSE OXIMETRY RANGE: SpO2  Av.8 %  Min: 94 %  Max: 97 %  24HR INTAKE/OUTPUT:      Intake/Output Summary (Last 24 hours) at 2020 5854  Last data filed at 2020 4624  Gross per 24 hour   Intake 240 ml   Output 780 ml   Net -540 ml       CONSTITUTIONAL:  awake, alert, cooperative, no apparent distress, HEENT oral pharynx , no scleral icterus  HEMATOLOGIC/LYMPHATICS:  no cervical lymphadenopathy, no supraclavicular lymphadenopathy, no axillary lymphadenopathy and no inguinal lymphadenopathy  LUNGS:  No increased work of breathing, good air exchange, clear to auscultation bilaterally, no crackles or wheezing  CARDIOVASCULAR:  , regular rate and rhythm, normal S1 and S2, no S3 or S4, and no murmur noted  ABDOMEN:  No scars, normal bowel sounds, soft, non-distended, non-tender, no masses palpated, no hepatosplenomegally  MUSCULOSKELETAL:  There is no redness, warmth, or swelling of the joints. EXTREMETIES: No clubbing cynosis or edema  NEUROLOGIC:  Awake, alert, oriented to name, place and time. Cranial nerves II-XII are grossly intact. Motor is 5 out of 5 bilaterally.    SKIN:  no bruising or bleeding      Data      Recent Labs     20  0235 20  0431 20  0405   WBC 6.9 3.6* 3.6*   HGB 9.5* 8.6* 8.4*   HCT 28.9* 26.1* 26.0*    201 220   MCV 75.1* 75.9* 76.4*        Recent Labs     20  0235 20  2232 20  0435 12/02/20  0405   *  --  132* 134*   K 3.2* 4.1 4.2 4.1   CL 94*  --  102 103   CO2 21  --  20* 21   PHOS 3.7  --  2.4* 1.7*   BUN 31*  --  22* 27*   CREATININE 0.9  --  0.8 0.8     Recent Labs     11/30/20  0235 12/01/20  0431 12/02/20  0405   AST 45* 33 28   ALT 17 13 17   BILITOT 0.5 0.3 0.3   ALKPHOS 103 87 86       Magnesium:    Lab Results   Component Value Date    MG 1.90 12/02/2020    MG 2.10 12/01/2020    MG 1.40 11/30/2020         Problem List  Patient Active Problem List   Diagnosis    GERD (gastroesophageal reflux disease)    Malignant neoplasm of upper-outer quadrant of right breast in female, estrogen receptor negative ([address]    DDD (degenerative disc disease), lumbar    Anemia    Estrogen receptor negative    Acute deep vein thrombosis (DVT) of distal end of right lower extremity ([address]    Essential hypertension    Mixed hyperlipidemia    Generalized anxiety disorder    Primary osteoarthritis involving multiple joints    Secondary malignant neoplasm of bone ([address]    Breast cancer in female ([address]    SOB (shortness of breath)    History of pulmonary embolus (PE)    SVT (supraventricular tachycardia) (HCC)    Metastatic breast cancer ([address]    JAYNA (acute kidney injury) ([address]    History of recurrent deep vein thrombosis (DVT)    Chronic anticoagulation    New onset seizure ([address]    Acute encephalopathy    HTN (hypertension), benign    Metastasis to brain ([address]       ASSESSMENT AND PLAN    1.) Triple neg breast cancer  - Currently receiving 3rd line Eribulin. Due on 12/02/2020 for cycle 28, day 8 chemo. Will hold. - CT chest, 10/13/2020, was stable. - Updated CTPA, CT abd/pelvis this admission were both stable.     2.) Brain mets  - CT head, 11/29/2020, showed extensive brain mets. - Started Dex on 11/30/2020.  - Keppra 500 mg BID per neuro. - MRI brain, 12/01/2020, showed innumerable brain mets without mass effect or midline shift. No significant edema while on Dex.   - Will require WBRT as an outpatient.     3.) RLE DVT  - Diagnosed on 11/15/2016.  - Treating with Xarelto.     4.) SVT  - Defer to cardiology. 5.) Constipation  - Started Senokot-S on 12/01/2020.  - Improved. ONCOLOGIC DISPOSITION:  No onc barrier to discharge. Has follow up tomorrow in the office.     Jarett Steel MD  Please contact through [facility]

## 2020-12-02 NOTE — CARE COORDINATION
CM spoke with both pt and son Clarissa Nixon via phone to discuss home DC plans. Pt states that she feels safe going home but son Clarissa Nixon has concerns about her ambulating 2/2 \"her knees\". He states he is aware this is a chronic issue and nothing new for pt. Both are agreeable to pt having [place] [id number] set up for DC with no agency preference. Referral placed with Rich Aguirre RN at [facility] who is aware and follow. Also discussed POC with primary RN. Of note, pt remained alert and oriented x4 during entirety of conversation with CM. No further needs at this time.      Roberta Mendoza, CYNDIE Mendoza, RN

## 2020-12-03 NOTE — CARE COORDINATION
Attempted to reach patient via phone for initial post hospital transition call. VM left stating purpose of call along with my contact information requesting a return call.       Krystin Sutherland, RN   Care Transition Nurse  [phone]

## 2020-12-03 NOTE — DISCHARGE SUMMARY
Hospital Medicine Discharge Summary    Patient ID: Governor Means      Patient's PCP: Rae Nuñez MD    Admit Date: 11/30/2020     Discharge Date: 12/2/2020      Admitting Physician: Annabel Kam MD     Discharge Physician: Gentry Borges MD     Discharge Diagnoses: Active Hospital Problems    Diagnosis    Metastasis to brain [facility]) [C79.31]    Metastatic breast cancer ([facility] [C50.919]    JAYNA (acute kidney injury) ([facility] [N17.9]    History of recurrent deep vein thrombosis (DVT) [Z86.718]    Chronic anticoagulation [Z79.01]    New onset seizure ([facility] [R56.9]    Acute encephalopathy [G93.40]    HTN (hypertension), benign [I10]    SVT (supraventricular tachycardia) ([facility] [I47.1]       The patient was seen and examined on day of discharge and this discharge summary is in conjunction with any daily progress note from day of discharge. Hospital Course:         Stage IV Breast CA - (invasive ductal, triple negative) w/ known lung and bony metastases.  Now with newly discovered brain lesions and new onset seizure.  MRI brain ordered and c/w metastatic disease - discussed w/ patient and son, present at bedside. Neurology consulted and appreciated - started on antiepileptic. Heme/Onc consulted and appreciated w/ finding of metastatic brain lesions - now on Decadron. Plan for outpt Whole Brain Radiation Therapy.      Encephalopathy - acute metabolic, 2nd to above. Followed clinical response w/ supportive care PRN.      Fall - Unclear why patient fell although she says she tripped on a cat.  To medics and to her physician at [place]. [place] she appeared quite ill.  Much of her lab was also quite abnormal.    No evidence of an infectious process.  No empiric antibiotics will be given at this time.       SVT - Converted at [place]. [place] w/ adenosine.  Subsequently on Dilt gtt - now stopped.  Started PO Metoprolol      HTN - w/out known CAD and no evidence of active signs/sxs of ischemia/failure. Currently controlled on home meds.     ARF - w/ elevated BUN/Cr ratio c/w pre-renal azotemia. Given IVF hydration and followed serial labs.       Hx DVT - anticoagulated at baseline on home Xarelto - continued.         Labs: For convenience and continuity at follow-up the following most recent labs are provided:      CBC:    Lab Results   Component Value Date    WBC 3.6 12/02/2020    HGB 8.4 12/02/2020    HCT 26.0 12/02/2020     12/02/2020       Renal:    Lab Results   Component Value Date     12/02/2020    K 4.1 12/02/2020    K 4.1 06/14/2019     12/02/2020    CO2 21 12/02/2020    BUN 27 12/02/2020    CREATININE 0.8 12/02/2020    CALCIUM 7.9 12/02/2020    PHOS 1.7 12/02/2020         Significant Diagnostic Studies    Radiology:   MRI BRAIN W WO CONTRAST   Final Result   1. Innumerable enhancing lesions throughout the supratentorial and   infratentorial compartments bilaterally compatible with metastatic disease. No significant surrounding vasogenic edema, mass effect or midline shift. 2. No acute territorial infarct. 3. Mild global parenchymal volume loss with chronic microvascular ischemic   changes. 4. There is diffusely decreased T1 marrow signal, which is nonspecific, but   can be seen with chronic anemia as well as a diffusely infiltrative marrow   process. Consults:     IP CONSULT TO HEM/ONC  IP CONSULT TO PALLIATIVE CARE  IP CONSULT TO NEUROLOGY    Disposition: Home     Condition at Discharge: Stable    Discharge Instructions/Follow-up:  w/ PCP 1-2 weeks and subspecialists as arranged.      Code Status:  Full Code    Activity: activity as tolerated    Diet: regular diet      Discharge Medications:     Discharge Medication List as of 12/2/2020  4:26 PM           Details   metoprolol succinate (TOPROL XL) 25 MG extended release tablet Take 1 tablet by mouth daily, Disp-30 tablet,R-0Print      levETIRAcetam (KEPPRA) 500 MG tablet Take 1 tablet by mouth 2 times daily, Disp-60 tablet,R-0Print      dexamethasone (DECADRON) 4 MG tablet Take 1 tablet by mouth daily (with breakfast) for 10 days, Disp-10 tablet,R-0Print              Details   sertraline (ZOLOFT) 100 MG tablet Take 1 tablet by mouth daily, Disp-30 tablet,R-5Normal      omeprazole (PRILOSEC) 20 MG delayed release capsule TAKE 1 CAPSULE BY MOUTH EVERY DAY, Disp-90 capsule,R-1Normal      XARELTO 20 MG TABS tablet TAKE 1 TABLET BY MOUTH EVERY DAY WITH BREAKFAST, Disp-90 tablet,R-1Normal      losartan-hydroCHLOROthiazide (HYZAAR) 50-12.5 MG per tablet TAKE 1 TABLET BY MOUTH EVERY DAY, Disp-90 tablet, R-1Normal      oxyCODONE-acetaminophen (PERCOCET) 5-325 MG per tablet Take 1-2 tablets by mouth every 4 hours as needed for pain., Disp-120 tablet, R-0Print      potassium chloride (KLOR-CON M) 20 MEQ extended release tablet Take 1 tablet by mouth 2 times daily, Disp-60 tablet, R-3Normal      cyclobenzaprine (FLEXERIL) 10 MG tablet Take 1 tablet by mouth 3 times daily as needed for Muscle spasms, Disp-90 tablet, R-3Normal      Spacer/Aero-Holding Chambers (POCKET SPACER) RYAN Starting 8/14/2013, Until Discontinued, Disp-1 Device, R-5, NormalUse with inhaler             Time Spent on discharge is more than 30 minutes in the examination, evaluation, counseling and review of medications and discharge plan. Signed:    Casimer Fabry, MD   12/3/2020      Thank you Kang Calderon MD for the opportunity to be involved in this patient's care. If you have any questions or concerns please feel free to contact me at [phone].

## 2020-12-04 NOTE — CARE COORDINATION
[facility] Transitions Initial Follow Up Call    Call within 2 business days of discharge: Yes    Patient: Jerald Mehta Patient : 1945   MRN: [id number]  Reason for Admission: SVT  Discharge Date: 20 RARS: Readmission Risk Score: 25      Last Discharge [facility]       Complaint Diagnosis Description Type Department Provider    20  SVT (supraventricular tachycardia) [facility]) Admission (Discharged) Eloy Malhotra MD           Spoke with: [address]: A      Challenges to be reviewed by the provider   Additional needs identified to be addressed with provider No  none    Discussed COVID-19 related testing which was not done at this time. Test results were not done. Patient informed of results, if available? No         Method of communication with provider : phone    Advance Care Planning:   Does patient have an Advance Directive:  not on file. Was this a readmission? No    Patients top risk factors for readmission: medical condition    Care Transition Nurse (CTN) contacted the patient by telephone to perform post hospital discharge assessment. Verified name and  with patient as identifiers. Provided introduction to self, and explanation of the CTN role. CTN reviewed discharge instructions, medical action plan and red flags with patient who verbalized understanding. Patient given an opportunity to ask questions and does not have any further questions or concerns at this time. Were discharge instructions available to patient? Yes. Reviewed appropriate site of care based on symptoms and resources available to patient including: PCP. The patient agrees to contact the PCP office for questions related to their healthcare. Medication reconciliation was performed with patient, who verbalizes understanding of administration of home medications. Advised obtaining a 90-day supply of all daily and as-needed medications.      Discussed follow-up appointments. If no appointment was previously scheduled, appointment scheduling offered: Yes. Is follow up appointment scheduled within 7 days of discharge? No    Plan for follow-up call in 5-7 days based on severity of symptoms and risk factors. Inbound call from patient returning call to CTN from voicemail message. Patient feeling good except for fatigue. Patient is scheduled for radiation treatment this afternoon. All medications reviewed and taking as directed. CTN reviewed purpose and direction of new medications. Patient states that she is not taking potassium and noted in system by CTN. Patient has not heard from home care agency yet and CTN placed call to ISMAEL Crowley at [facility]. Referral was sent and note that messages have been left for patient. Patient scheduled with PCP, Dr Rosa Guardado later this month and able to get to appt. Denies any acute needs at present time. Agreeable to f/u calls. Educated on the use of urgent care or physicians 24 hr access line if assistance is needed after hours. CTN provided contact information for future needs.   Akash Jo RN   Care Transition Nurse  [phone]    Care Transitions 24 Hour Call    Do you have any ongoing symptoms?:  No  Do you have a copy of your discharge instructions?:  Yes  Do you have all of your prescriptions and are they filled?:  Yes  Have you been contacted by a [address]?:  No  Have you scheduled your follow up appointment?:  Yes  How are you going to get to your appointment?:  Car - family or friend to transport  Were you discharged with any Home Care or Post Acute Services:  Yes  Post Acute Services:  Home Health  Patient DME:  Straight cane, Walker, Tub transfer bench  Do you have support at home?:  Alone  Do you feel like you have everything you need to keep you well at home?:  Yes  Are you an active caregiver in your home?:  No  Care Transitions Interventions         Follow Up  Future Appointments   Date Time Provider Department [place]   12/15/2020  3:20 PM MD CRISTIANO Garcia   12/29/2020  2:00 PM MD CRISTIANO Garcia RN

## 2020-12-07 NOTE — RESULT ENCOUNTER NOTE
Result reviewed, no further treatment needed. Likely contaminant in 1 of 2 blood cultures. However, she should follow-up with her primary care doctor about her slightly elevated TSH, she should talk to her doctor about her thyroid function.

## 2020-12-10 NOTE — CARE COORDINATION
[facility] Transitions Follow Up Call    12/10/2020    Patient: Halle Mora  Patient : 1945   MRN: [id number]  Reason for Admission: SVT  Discharge Date: 20 RARS: Readmission Risk Score: 25         Spoke with: Halle Mora   Patient pleasant and agreeable to follow up call. Patient states that she if feeling tired today, but had home care nurse at her home for visit. Client confirmed follow up appt with PCP scheduled and denied any medication changes. Denies any acute needs at present time. Agreeable to f/u calls. Educated on the use of urgent care or physicians 24 hr access line if assistance is needed after hours. Care Transitions Subsequent and Final Call    Subsequent and Final Calls  Do you have any ongoing symptoms?:  No  Have your medications changed?:  No  Do you have any questions related to your medications?:  No  Do you currently have any active services?:  Yes  Are you currently active with any services?:  Home Health  Do you have any needs or concerns that I can assist you with?:  No  Identified Barriers:  None  Care Transitions Interventions  Other Interventions:             Follow Up  Future Appointments   Date Time Provider Lit [place]   2020  2:00 PM MD Rodolfo Garcia, RN  Care Transition Nurse  [phone]

## 2020-12-18 NOTE — CARE COORDINATION
[facility] Trans[facility] Follow Up Call    2020    Patient: Jennifer Barger  Patient : 1945   MRN: [id number]  Reason for Admission: SVT  Discharge Date: 20 RARS: Readmission Risk Score: 25         Spoke with: Jennifer Barger    Patient pleasant and feeling good today. Patient completed radiation today and stated that it was her last treatment. Patient has a good appetite and eating well. Patient seen by oncologist and scheduled with PCP later this month. Denied any medication changes. Denies any acute needs at present time. Agreeable to f/u calls. Educated on the use of urgent care or physicians 24 hr access line if assistance is needed after hours. Andres Jin RN   Care Transition Nurse  [phone]      Care Transitions Subsequent and Final Call    Subsequent and Final Calls  Do you have any ongoing symptoms?: No  Have your medications changed?: No  Do you have any questions related to your medications?: No  Do you currently have any active services?: Yes  Are you currently active with any services?: Home Health  Do you have any needs or concerns that I can assist you with?: No  Identified Barriers: None  Care Transitions Interventions  Other Interventions:            Follow Up  Future Appointments   Date Time Provider Lit Vaughan   2020  2:00 PM Manny Ferrari  [address]       Andres Jin, RN

## 2020-12-28 NOTE — TELEPHONE ENCOUNTER
[facility] called and is requesting for recert for PT and OT.

## 2020-12-28 NOTE — TELEPHONE ENCOUNTER
Patient cancelled her appt for tomorrow. She said she is not well enough to come out. The radiation is taking a lot out of her and her has fallen out a lot. She is not sure when she will feel like coming in to see you. Left message for son to let him know at first we couldn't get ahold of her last week, but need to let him know that she cancelled her appt.

## 2020-12-30 NOTE — CARE COORDINATION
[facility] Transitions Follow Up Call    2020    Patient: Drake Anderson  Patient : 1945   MRN: [id number]    Discharge Date: 20 RARS: Readmission Risk Score: 25     attempt made to reach patient for transition call. VM left stating purpose of call along with my contact information requesting a return call. Care Transitions Subsequent and Final Call    Subsequent and Final Calls  Care Transitions Interventions  Other Interventions: Follow Up  No future appointments.     Katie Schmidt RN'

## 2021-01-01 ENCOUNTER — CARE COORDINATION (OUTPATIENT)
Dept: CASE MANAGEMENT | Age: 76
End: 2021-01-01

## 2021-01-01 RX ORDER — LOSARTAN POTASSIUM AND HYDROCHLOROTHIAZIDE 12.5; 5 MG/1; MG/1
TABLET ORAL
Qty: 90 TABLET | Refills: 1 | Status: SHIPPED | OUTPATIENT
Start: 2021-01-01

## (undated) DEVICE — GOWN SIRUS NONREIN LG W/TWL: Brand: MEDLINE INDUSTRIES, INC.

## (undated) DEVICE — PREP SOL PVP IODINE 4%  4 OZ/BTL

## (undated) DEVICE — TIP SUCT DIA12FR W STYL CTRL VENT DISPOSABLE FRAZ

## (undated) DEVICE — GAUZE,SPONGE,4"X4",8PLY,STRL,LF,10/TRAY: Brand: MEDLINE

## (undated) DEVICE — CANNULA NSL 13FT TUBE AD ETCO2 DIV SAMP M

## (undated) DEVICE — 1010 S-DRAPE TOWEL DRAPE 10/BX: Brand: STERI-DRAPE™

## (undated) DEVICE — 6 ML SYRINGE LUER-LOCK TIP: Brand: MONOJECT

## (undated) DEVICE — SURGICAL PROCEDURE PACK EYE CLERMONT

## (undated) DEVICE — APPLICATOR PREP 26ML 0.7% IOD POVACRYLEX 74% ISO ALC ST

## (undated) DEVICE — 3M™ STERI-STRIP™ COMPOUND BENZOIN TINCTURE 40 BAGS/CARTON 4 CARTONS/CASE C1544: Brand: 3M™ STERI-STRIP™

## (undated) DEVICE — MAJOR SET UP PK

## (undated) DEVICE — ELECTRODE PT RET AD L9FT HI MOIST COND ADH HYDRGEL CORDED

## (undated) DEVICE — PACK,UNIVERSAL,SPLIT,II,AURORA: Brand: MEDLINE

## (undated) DEVICE — SOLUTION IV IRRIG WATER 1000ML POUR BRL 2F7114

## (undated) DEVICE — NEEDLE HYPO 25GA L1.5IN BLU POLYPR HUB S STL REG BVL STR

## (undated) DEVICE — DRAPE C ARM UNIV W41XL74IN CLR PLAS XR VELC CLSR POLY STRP

## (undated) DEVICE — GLOVE ORANGE PI 7 1/2   MSG9075

## (undated) DEVICE — 3M™ STERI-STRIP™ REINFORCED ADHESIVE SKIN CLOSURES, R1540, 1/8 IN X 3 IN (3 MM X 75 MM), 5 STRIPS/ENVELOPE: Brand: 3M™ STERI-STRIP™

## (undated) DEVICE — SYRINGE MED 10ML LUERLOCK TIP W/O SFTY DISP

## (undated) DEVICE — GLOVE ORANGE PI 8   MSG9080

## (undated) DEVICE — SUTURE VCRL SZ 3-0 L18IN ABSRB UD L26MM SH 1/2 CIR J864D

## (undated) DEVICE — SUTURE VCRL SZ 3-0 L54IN ABSRB UD LIGAPAK REEL POLYGLACTIN J285G

## (undated) DEVICE — SOLUTION IV IRRIG 500ML 0.9% SODIUM CHL 2F7123

## (undated) DEVICE — Device

## (undated) DEVICE — RESERVOIR,SUCTION,100CC,SILICONE: Brand: MEDLINE

## (undated) DEVICE — DRAIN SURG L0.375IN SIL CHN FLAT FULL FLOATED RADPQ CLS WND

## (undated) DEVICE — CANNULA NSL O2 AD 7 FT TBNG SFT TCH STD CONN N FLARED PRNG

## (undated) DEVICE — SUTURE VCRL SZ 4-0 L18IN ABSRB UD L19MM PS-2 3/8 CIR PRIM J496H

## (undated) DEVICE — 3M™ TEGADERM™ TRANSPARENT FILM DRESSING FRAME STYLE, 1626W, 4 IN X 4-3/4 IN (10 CM X 12 CM), 50/CT 4CT/CASE: Brand: 3M™ TEGADERM™